# Patient Record
Sex: MALE | Race: WHITE | NOT HISPANIC OR LATINO | Employment: FULL TIME | URBAN - METROPOLITAN AREA
[De-identification: names, ages, dates, MRNs, and addresses within clinical notes are randomized per-mention and may not be internally consistent; named-entity substitution may affect disease eponyms.]

---

## 2019-01-22 ENCOUNTER — TRANSCRIBE ORDERS (OUTPATIENT)
Dept: ADMINISTRATIVE | Facility: HOSPITAL | Age: 64
End: 2019-01-22

## 2019-01-22 DIAGNOSIS — R07.9 CHEST PAIN, UNSPECIFIED TYPE: Primary | ICD-10-CM

## 2019-01-31 ENCOUNTER — HOSPITAL ENCOUNTER (OUTPATIENT)
Dept: CARDIOLOGY | Facility: HOSPITAL | Age: 64
Discharge: HOME | End: 2019-01-31
Attending: INTERNAL MEDICINE | Admitting: INTERNAL MEDICINE

## 2019-01-31 DIAGNOSIS — R07.9 CHEST PAIN, UNSPECIFIED TYPE: ICD-10-CM

## 2019-01-31 PROCEDURE — 93016 CV STRESS TEST SUPVJ ONLY: CPT | Performed by: INTERNAL MEDICINE

## 2019-01-31 PROCEDURE — 93018 CV STRESS TEST I&R ONLY: CPT | Performed by: INTERNAL MEDICINE

## 2019-01-31 PROCEDURE — 93017 CV STRESS TEST TRACING ONLY: CPT

## 2019-02-01 LAB
BH CV STRESS BP STAGE 1: NORMAL
BH CV STRESS BP STAGE 2: NORMAL
BH CV STRESS BP STAGE 3: NORMAL
BH CV STRESS DURATION MIN STAGE 1: 3
BH CV STRESS DURATION MIN STAGE 2: 3
BH CV STRESS DURATION MIN STAGE 3: 1
BH CV STRESS DURATION SEC STAGE 1: 0
BH CV STRESS DURATION SEC STAGE 2: 0
BH CV STRESS DURATION SEC STAGE 3: 21
BH CV STRESS GRADE STAGE 1: 10
BH CV STRESS GRADE STAGE 2: 12
BH CV STRESS GRADE STAGE 3: 14
BH CV STRESS HR STAGE 1: 113
BH CV STRESS HR STAGE 2: 125
BH CV STRESS HR STAGE 3: 138
BH CV STRESS METS STAGE 1: 5
BH CV STRESS METS STAGE 2: 7.5
BH CV STRESS METS STAGE 3: 10
BH CV STRESS PROTOCOL 1: NORMAL
BH CV STRESS RECOVERY BP: NORMAL MMHG
BH CV STRESS RECOVERY HR: 102 BPM
BH CV STRESS SPEED STAGE 1: 1.7
BH CV STRESS SPEED STAGE 2: 2.5
BH CV STRESS SPEED STAGE 3: 3.4
BH CV STRESS STAGE 1: 1
BH CV STRESS STAGE 2: 2
BH CV STRESS STAGE 3: 3
MAXIMAL PREDICTED HEART RATE: 158 BPM
PERCENT MAX PREDICTED HR: 87.34 %
STRESS BASELINE BP: NORMAL MMHG
STRESS BASELINE HR: 95 BPM
STRESS O2 SAT REST: 95 %
STRESS PERCENT HR: 103 %
STRESS POST ESTIMATED WORKLOAD: 9.1 METS
STRESS POST EXERCISE DUR MIN: 7 MIN
STRESS POST EXERCISE DUR SEC: 3 SEC
STRESS POST O2 SAT PEAK: 95 %
STRESS POST PEAK BP: NORMAL MMHG
STRESS POST PEAK HR: 138 BPM
STRESS TARGET HR: 134 BPM

## 2019-06-20 ENCOUNTER — OFFICE VISIT (OUTPATIENT)
Dept: GASTROENTEROLOGY | Facility: CLINIC | Age: 64
End: 2019-06-20

## 2019-06-20 ENCOUNTER — HOSPITAL ENCOUNTER (OUTPATIENT)
Facility: HOSPITAL | Age: 64
Setting detail: HOSPITAL OUTPATIENT SURGERY
End: 2019-06-20
Attending: INTERNAL MEDICINE | Admitting: INTERNAL MEDICINE

## 2019-06-20 VITALS
WEIGHT: 225 LBS | SYSTOLIC BLOOD PRESSURE: 128 MMHG | DIASTOLIC BLOOD PRESSURE: 70 MMHG | BODY MASS INDEX: 34.1 KG/M2 | HEIGHT: 68 IN

## 2019-06-20 DIAGNOSIS — K21.9 GASTROESOPHAGEAL REFLUX DISEASE, ESOPHAGITIS PRESENCE NOT SPECIFIED: Primary | ICD-10-CM

## 2019-06-20 DIAGNOSIS — K59.04 CHRONIC IDIOPATHIC CONSTIPATION: ICD-10-CM

## 2019-06-20 DIAGNOSIS — K62.5 RECTAL BLEEDING: ICD-10-CM

## 2019-06-20 PROCEDURE — 99204 OFFICE O/P NEW MOD 45 MIN: CPT | Performed by: INTERNAL MEDICINE

## 2019-06-20 RX ORDER — LISINOPRIL AND HYDROCHLOROTHIAZIDE 25; 20 MG/1; MG/1
1 TABLET ORAL DAILY
COMMUNITY
Start: 2019-06-17

## 2019-06-20 RX ORDER — RANITIDINE 300 MG/1
300 CAPSULE ORAL 2 TIMES DAILY
Qty: 60 CAPSULE | Refills: 11 | Status: SHIPPED | OUTPATIENT
Start: 2019-06-20 | End: 2020-04-29 | Stop reason: SDUPTHER

## 2019-06-20 RX ORDER — AMLODIPINE BESYLATE 10 MG/1
10 TABLET ORAL DAILY
COMMUNITY
Start: 2019-06-02

## 2019-06-20 RX ORDER — LORAZEPAM 1 MG/1
1 TABLET ORAL 2 TIMES DAILY PRN
COMMUNITY
Start: 2015-09-16

## 2019-06-20 RX ORDER — IBUPROFEN 800 MG/1
800 TABLET ORAL 2 TIMES DAILY PRN
COMMUNITY
Start: 2019-05-13

## 2019-06-20 RX ORDER — DULOXETIN HYDROCHLORIDE 30 MG/1
30 CAPSULE, DELAYED RELEASE ORAL DAILY
COMMUNITY
Start: 2019-06-17 | End: 2022-12-14

## 2019-06-20 RX ORDER — ATORVASTATIN CALCIUM 10 MG/1
TABLET, FILM COATED ORAL
COMMUNITY
Start: 2019-06-06 | End: 2020-06-25 | Stop reason: SDUPTHER

## 2019-06-20 NOTE — PROGRESS NOTES
PATIENT INFORMATION  Trenton Sears       - 1955    CHIEF COMPLAINT  Chief Complaint   Patient presents with   • Constipation   • Abdominal Pain       HISTORY OF PRESENT ILLNESS  Says his UC was dx'd in  and only on med for a year or 2 then. BMs are 2 a day. Doesn't skip( and he feels its small amounts. Lots of blood per his history and mucous, but formed stool.    Doesn't have labs from [place] but he says no anemia nd elevated cholesterol.    Quit Omeprazole due to her renal issues.             REVIEW OF SYSTEMS  Review of Systems   Constitutional: Positive for fatigue.   Eyes: Positive for redness, itching and visual disturbance.   Respiratory: Positive for apnea.    Gastrointestinal: Positive for abdominal distention, abdominal pain, anal bleeding, blood in stool, constipation and rectal pain.        Gerd   Genitourinary: Positive for difficulty urinating.   Musculoskeletal: Positive for back pain.   Allergic/Immunologic: Positive for environmental allergies.   All other systems reviewed and are negative.        ACTIVE PROBLEMS  There are no active problems to display for this patient.        PAST MEDICAL HISTORY  Past Medical History:   Diagnosis Date   • Colon polyp    • Hypertension          SURGICAL HISTORY  Past Surgical History:   Procedure Laterality Date   • COLONOSCOPY           FAMILY HISTORY  Family History   Problem Relation Age of Onset   • Crohn's disease Sister    • Colon cancer Neg Hx    • Colon polyps Neg Hx          SOCIAL HISTORY  Social History     Occupational History   • Not on file   Tobacco Use   • Smoking status: Current Every Day Smoker   • Smokeless tobacco: Never Used   Substance and Sexual Activity   • Alcohol use: No     Frequency: Never   • Drug use: Not on file   • Sexual activity: Not on file       Debilities/Disabilities Identified: None    Emotional Behavior: Appropriate    CURRENT MEDICATIONS    Current Outpatient Medications:   •  LORazepam (ATIVAN) 1 MG  "tablet, lorazepam 1 mg tablet  TAKE ONE TABLET BY MOUTH EVERY 6 HOURS AS NEEDED, Disp: , Rfl:   •  amLODIPine (NORVASC) 10 MG tablet, , Disp: , Rfl:   •  ASPIRIN 81 PO, Aspir-81  one daily, Disp: , Rfl:   •  atorvastatin (LIPITOR) 10 MG tablet, , Disp: , Rfl:   •  DULoxetine (CYMBALTA) 30 MG capsule, , Disp: , Rfl:   •  ibuprofen (ADVIL,MOTRIN) 800 MG tablet, , Disp: , Rfl:   •  lisinopril-hydrochlorothiazide (PRINZIDE,ZESTORETIC) 20-25 MG per tablet, , Disp: , Rfl:     ALLERGIES  Patient has no known allergies.    VITALS  Vitals:    06/20/19 1001   BP: 128/70   Weight: 102 kg (225 lb)   Height: 172.7 cm (68\")       LAST RESULTS   Hospital Outpatient Visit on 01/31/2019   Component Date Value Ref Range Status   • BH CV STRESS PROTOCOL 1 01/31/2019 Fly   Final   • Stage 1 01/31/2019 1   Final   • HR Stage 1 01/31/2019 113   Final   • BP Stage 1 01/31/2019 152/77   Final   • Duration Min Stage 1 01/31/2019 3   Final   • Duration Sec Stage 1 01/31/2019 0   Final   • Grade Stage 1 01/31/2019 10   Final   • Speed Stage 1 01/31/2019 1.7   Final   • BH CV STRESS METS STAGE 1 01/31/2019 5   Final   • Stage 2 01/31/2019 2   Final   • HR Stage 2 01/31/2019 125   Final   • BP Stage 2 01/31/2019 183/76   Final   • Duration Min Stage 2 01/31/2019 3   Final   • Duration Sec Stage 2 01/31/2019 0   Final   • Grade Stage 2 01/31/2019 12   Final   • Speed Stage 2 01/31/2019 2.5   Final   • BH CV STRESS METS STAGE 2 01/31/2019 7.5   Final   • Stage 3 01/31/2019 3   Final   • HR Stage 3 01/31/2019 138   Final   • BP Stage 3 01/31/2019 198/82   Final   • Duration Min Stage 3 01/31/2019 1   Final   • Duration Sec Stage 3 01/31/2019 21   Final   • Grade Stage 3 01/31/2019 14   Final   • Speed Stage 3 01/31/2019 3.4   Final   • BH CV STRESS METS STAGE 3 01/31/2019 10.0   Final   • Baseline HR 01/31/2019 95  bpm Final   • Baseline BP 01/31/2019 136/90  mmHg Final   • O2 sat rest 01/31/2019 95  % Final   • Peak HR 01/31/2019 138  bpm Final "   • Percent Max Pred HR 01/31/2019 87.34  % Final   • Percent Target HR 01/31/2019 103  % Final   • Peak BP 01/31/2019 198/90  mmHg Final   • O2 sat peak 01/31/2019 95  % Final   • Recovery HR 01/31/2019 102  bpm Final   • Recovery BP 01/31/2019 146/86  mmHg Final   • Target HR (85%) 01/31/2019 134  bpm Final   • Max. Pred. HR (100%) 01/31/2019 158  bpm Final   • Exercise duration (min) 01/31/2019 7  min Final   • Exercise duration (sec) 01/31/2019 3  sec Final   • Estimated workload 01/31/2019 9.1  METS Final     No results found.    PHYSICAL EXAM  Physical Exam   Constitutional: He is oriented to person, place, and time. He appears well-developed and well-nourished.   HENT:   Head: Normocephalic and atraumatic.   Eyes: Conjunctivae and EOM are normal. Pupils are equal, round, and reactive to light. No scleral icterus.   Neck: Normal range of motion. Neck supple. No thyromegaly present.   Cardiovascular: Normal rate, regular rhythm, normal heart sounds and intact distal pulses. Exam reveals no gallop.   No murmur heard.  Pulmonary/Chest: Effort normal and breath sounds normal. He has no wheezes. He has no rales.   Abdominal: Soft. Bowel sounds are normal. He exhibits no shifting dullness, no distension, no fluid wave, no abdominal bruit, no ascites and no mass. There is no hepatosplenomegaly. There is tenderness in the right upper quadrant, right lower quadrant, epigastric area, left upper quadrant and left lower quadrant. There is no guarding and negative Lanier's sign. Hernia confirmed negative in the ventral area.   Musculoskeletal: Normal range of motion. He exhibits no edema.   Lymphadenopathy:     He has no cervical adenopathy.   Neurological: He is alert and oriented to person, place, and time.   Skin: Skin is warm and dry. No rash noted. He is not diaphoretic. No erythema.       ASSESSMENT  Diagnoses and all orders for this visit:    Gastroesophageal reflux disease, esophagitis presence not  specified    Rectal bleeding    Chronic idiopathic constipation    Other orders  -     lisinopril-hydrochlorothiazide (PRINZIDE,ZESTORETIC) 20-25 MG per tablet  -     LORazepam (ATIVAN) 1 MG tablet; lorazepam 1 mg tablet   TAKE ONE TABLET BY MOUTH EVERY 6 HOURS AS NEEDED  -     DULoxetine (CYMBALTA) 30 MG capsule  -     amLODIPine (NORVASC) 10 MG tablet  -     atorvastatin (LIPITOR) 10 MG tablet  -     ibuprofen (ADVIL,MOTRIN) 800 MG tablet  -     ASPIRIN 81 PO; Aspir-81   one daily          PLAN  Return in about 2 months (around 8/20/2019).

## 2019-09-24 ENCOUNTER — ANESTHESIA EVENT (OUTPATIENT)
Dept: PERIOP | Facility: HOSPITAL | Age: 64
End: 2019-09-24

## 2019-09-24 RX ORDER — SODIUM CHLORIDE 0.9 % (FLUSH) 0.9 %
1-10 SYRINGE (ML) INJECTION AS NEEDED
Status: CANCELLED | OUTPATIENT
Start: 2019-09-24

## 2019-09-24 RX ORDER — SODIUM CHLORIDE 9 MG/ML
40 INJECTION, SOLUTION INTRAVENOUS AS NEEDED
Status: CANCELLED | OUTPATIENT
Start: 2019-09-24

## 2019-09-24 RX ORDER — SODIUM CHLORIDE, SODIUM LACTATE, POTASSIUM CHLORIDE, CALCIUM CHLORIDE 600; 310; 30; 20 MG/100ML; MG/100ML; MG/100ML; MG/100ML
9 INJECTION, SOLUTION INTRAVENOUS CONTINUOUS
Status: CANCELLED | OUTPATIENT
Start: 2019-09-24

## 2019-09-24 RX ORDER — SODIUM CHLORIDE 0.9 % (FLUSH) 0.9 %
3 SYRINGE (ML) INJECTION EVERY 12 HOURS SCHEDULED
Status: CANCELLED | OUTPATIENT
Start: 2019-09-24

## 2019-09-24 RX ORDER — LIDOCAINE HYDROCHLORIDE 10 MG/ML
0.5 INJECTION, SOLUTION EPIDURAL; INFILTRATION; INTRACAUDAL; PERINEURAL ONCE AS NEEDED
Status: CANCELLED | OUTPATIENT
Start: 2019-09-24

## 2019-09-25 ENCOUNTER — TELEPHONE (OUTPATIENT)
Dept: GASTROENTEROLOGY | Facility: CLINIC | Age: 64
End: 2019-09-25

## 2019-09-25 ENCOUNTER — ANESTHESIA (OUTPATIENT)
Dept: PERIOP | Facility: HOSPITAL | Age: 64
End: 2019-09-25

## 2019-09-25 NOTE — TELEPHONE ENCOUNTER
CALLED AND SPOKE WITH PATIENT.  HE FORGOT ABOUT PROCEDURE.  RESCHEDULED TO [place] 10/21/2019 AT 9:30AM -[facility] 8:30AM.  WILL MAIL INSTRUCTIONS

## 2019-09-25 NOTE — TELEPHONE ENCOUNTER
RECEIVED E-MAIL FROM [facility], SURGERY SCHEDULING, PRE-OP SPOKE WITH HIM AND HE FORGOT ABOUT PROCEDURE, SO HE DID NOT PREP.  NEED TO RESCHEDULE

## 2019-10-15 ENCOUNTER — PREP FOR SURGERY (OUTPATIENT)
Dept: OTHER | Facility: HOSPITAL | Age: 64
End: 2019-10-15

## 2019-10-15 DIAGNOSIS — K62.5 RECTAL BLEEDING: ICD-10-CM

## 2019-10-15 DIAGNOSIS — K21.9 GASTROESOPHAGEAL REFLUX DISEASE, ESOPHAGITIS PRESENCE NOT SPECIFIED: Primary | ICD-10-CM

## 2019-10-21 ENCOUNTER — LAB REQUISITION (OUTPATIENT)
Dept: LAB | Facility: HOSPITAL | Age: 64
End: 2019-10-21

## 2019-10-21 ENCOUNTER — OUTSIDE FACILITY SERVICE (OUTPATIENT)
Dept: GASTROENTEROLOGY | Facility: CLINIC | Age: 64
End: 2019-10-21

## 2019-10-21 DIAGNOSIS — K21.9 GASTRO-ESOPHAGEAL REFLUX DISEASE WITHOUT ESOPHAGITIS: ICD-10-CM

## 2019-10-21 PROCEDURE — 43239 EGD BIOPSY SINGLE/MULTIPLE: CPT | Performed by: INTERNAL MEDICINE

## 2019-10-21 PROCEDURE — 88305 TISSUE EXAM BY PATHOLOGIST: CPT | Performed by: INTERNAL MEDICINE

## 2019-10-22 LAB
CYTO UR: NORMAL
LAB AP CASE REPORT: NORMAL
LAB AP CLINICAL INFORMATION: NORMAL
LAB AP DIAGNOSIS COMMENT: NORMAL
PATH REPORT.FINAL DX SPEC: NORMAL
PATH REPORT.GROSS SPEC: NORMAL

## 2019-10-23 ENCOUNTER — TELEPHONE (OUTPATIENT)
Dept: GASTROENTEROLOGY | Facility: CLINIC | Age: 64
End: 2019-10-23

## 2019-10-23 NOTE — TELEPHONE ENCOUNTER
CALLED AND NOTIFIED PATIENT OF RESULTS.  HE WAS TOLD TO START TAKING OMEPRAZOLE ALONG WITH RANITIDINE.  DOES NOT HAVE RX FOR OMEPRAZOLE, PLEASE SEND TO PHARMACY (INFO IN CHART).

## 2019-10-24 RX ORDER — OMEPRAZOLE 40 MG/1
40 CAPSULE, DELAYED RELEASE ORAL DAILY
Qty: 90 CAPSULE | Refills: 3 | Status: SHIPPED | OUTPATIENT
Start: 2019-10-24 | End: 2020-08-20 | Stop reason: SDUPTHER

## 2019-12-05 ENCOUNTER — OFFICE VISIT (OUTPATIENT)
Dept: SLEEP MEDICINE | Facility: HOSPITAL | Age: 64
End: 2019-12-05

## 2019-12-05 VITALS
BODY MASS INDEX: 33.65 KG/M2 | HEIGHT: 68 IN | SYSTOLIC BLOOD PRESSURE: 142 MMHG | WEIGHT: 222 LBS | DIASTOLIC BLOOD PRESSURE: 92 MMHG | HEART RATE: 94 BPM

## 2019-12-05 DIAGNOSIS — G47.10 HYPERSOMNIA: ICD-10-CM

## 2019-12-05 DIAGNOSIS — G47.8 NON-RESTORATIVE SLEEP: ICD-10-CM

## 2019-12-05 DIAGNOSIS — G47.30 SLEEP APNEA, UNSPECIFIED TYPE: Primary | ICD-10-CM

## 2019-12-05 PROBLEM — G47.33 OBSTRUCTIVE SLEEP APNEA, ADULT: Status: ACTIVE | Noted: 2019-12-05

## 2019-12-05 PROCEDURE — G0463 HOSPITAL OUTPT CLINIC VISIT: HCPCS

## 2019-12-05 PROCEDURE — 99213 OFFICE O/P EST LOW 20 MIN: CPT | Performed by: FAMILY MEDICINE

## 2019-12-05 NOTE — PROGRESS NOTES
[facility] New Patient[facility]Consultation       Reason for Consultation: History of sleep apnea      Patient Care Team:  Amaury Wynn MD as PCP - General  Amaury Wynn MD as PCP - Family Medicine  Mihir Stevens MD as Consulting Physician (Sleep Medicine)      History of present illness:  Thank you for asking me to see your patient.  The patient is a 64 y.o. male with history of sleep apnea presents today to establish care.  Reports he had a sleep study about 7 years ago in [place] and was prescribed a Pap machine does not use currently.  Reports in the past is actually had a total of 2 sleep studies and was told he has severe sleep apnea each time however tried the machine each time for about a couple weeks and could not get used to the mask or the pressure therefore discontinued on his own.  Recently was under anesthesia for endoscopy and had issues where he stopped breathing when they pulled the scope out and had to be bagged.  Was told it was due to his untreated sleep apnea.  No history of tonsillectomy.    Sleep Schedule:  Bed time: 6 PM to 7 PM  Sleep latency: 30 minutes  Wake time: 3 AM  Average hours slept: 6 hours  Non-restorative sleep: Yes  Number of naps per day: 0 to  Rotating shifts?:  No  Nocturia: 3X  Electronics in bedroom: N    Excessive daytime sleepiness or drowsiness:Y  Any accidents at work due to sleepiness in the last 5 years:N  Any difficulty driving due to sleepiness or being drowsy: N  Weight changed in the last 5 years:N    Snoring:Y  Witnessed apneas:Y  Have you ever awakened gasping for breath, coughing, choking or respiratory discomfort: Y  Morning headaches: N  Awaken with a sore throat or dry mouth: Y    Any reports of leg jerking at night: N  Urge sensations: N  Does pain disrupt sleep: N  Sweating during sleep: N  Teeth grinding: N    Any sudden episodes of sleep during the day: N  Sleep paralysis/hallucinations: N  Muscle weakness with laughing/anger:  "N  Nightmares: N  Sleep walking: N    Are you sleepy when you increase your sleep time: Y  Do you sleep better away from your own bed: N    ESS: 10    Social History: Tach; smokes 1 pack/day since age 19; no alcohol or drug use; 4 caffeinated beverages a day    Review of Systems:    A complete review of systems was done and all were negative with the exception of all negative    Allergies:  Patient has no known allergies.    Family History: CALLY no       Current Outpatient Medications:   •  amLODIPine (NORVASC) 10 MG tablet, , Disp: , Rfl:   •  ASPIRIN 81 PO, Aspir-81  one daily, Disp: , Rfl:   •  atorvastatin (LIPITOR) 10 MG tablet, , Disp: , Rfl:   •  DULoxetine (CYMBALTA) 30 MG capsule, , Disp: , Rfl:   •  hydrocortisone (ANUSOL-HC) 2.5 % rectal cream, Insert  into the rectum 2 (Two) Times a Day., Disp: 30 g, Rfl: 5  •  ibuprofen (ADVIL,MOTRIN) 800 MG tablet, , Disp: , Rfl:   •  lisinopril-hydrochlorothiazide (PRINZIDE,ZESTORETIC) 20-25 MG per tablet, , Disp: , Rfl:   •  LORazepam (ATIVAN) 1 MG tablet, lorazepam 1 mg tablet  TAKE ONE TABLET BY MOUTH EVERY 6 HOURS AS NEEDED, Disp: , Rfl:   •  omeprazole (priLOSEC) 40 MG capsule, Take 1 capsule by mouth Daily., Disp: 90 capsule, Rfl: 3  •  ranitidine (ZANTAC) 300 MG capsule, Take 1 capsule by mouth 2 (Two) Times a Day., Disp: 60 capsule, Rfl: 11    Vital Signs:    Vitals:    12/05/19 0900   BP: 142/92   Pulse: 94   Weight: 101 kg (222 lb)   Height: 172.7 cm (68\")      Body mass index is 33.75 kg/m².  Neck Circumference: 17.75 inches      Physical Exam:   General Alert and oriented. No acute distress noted   Pharynx/Throat Class IV Mallampati airway, large tongue, no evidence of redundant lateral pharyngeal tissue. No oral lesions. No thrush. Moist mucous membranes.   Head Normocephalic. Symmetrical. Atraumatic.    Nose No septal deviation. No drainage   Chest Wall Normal shape. Symmetric expansion with respiration. No tenderness.   Neck Trachea midline, no " thyromegaly or adenopathy    Lungs Clear to auscultation bilaterally. No wheezes. No rhonchi. No rales. Respirations regular, even and unlabored.   Heart Regular rhythm and normal rate. Normal S1 and S2. No murmur   Abdomen Soft, non-tender and non-distended. Normal bowel sounds. No masses.   Extremities Moves all extremities well. No edema   Psychiatric Normal mood and affect.       Impression:  1. Sleep apnea, unspecified type    2. Hypersomnia    3. Non-restorative sleep        Plan:    Good sleep hygiene measures should be maintained.  Weight loss would be beneficial in this patient who is obese BMI 33.8.    I discussed the pathophysiology of obstructive sleep apnea with the patient.  We discussed the adverse outcomes associated with untreated sleep-disordered breathing.  We discussed treatment modalities of obstructive sleep apnea including CPAP device as well as oral mandibular advancement device. Sleep study will be scheduled to establish definitive diagnosis of sleep disorder breathing.  Weight loss will be strongly beneficial in order to reduce the severity of sleep-disordered breathing.  Patient has narrow oropharyngeal structure.  Caution during activities that require prolonged concentration is strongly advised.  Patient will be notified of sleep study results after sleep study is completed.  If sleep apnea is only mild,  oral mandibular advancement device may be one of the treatment options.  However if sleep apnea is moderately severe, CPAP treatment will be strongly encouraged.  The patient is not opposed to treatment with CPAP device if we confirm significant obstructive sleep apnea on polysomnography.     Thank you for allowing me to participate in your patient's care.    Mihir Stevens MD  Sleep Medicine  12/05/19  10:12 AM

## 2019-12-12 ENCOUNTER — HOSPITAL ENCOUNTER (OUTPATIENT)
Dept: SLEEP MEDICINE | Facility: HOSPITAL | Age: 64
Discharge: HOME | End: 2019-12-12
Admitting: FAMILY MEDICINE

## 2019-12-12 DIAGNOSIS — G47.10 HYPERSOMNIA: ICD-10-CM

## 2019-12-12 DIAGNOSIS — G47.8 NON-RESTORATIVE SLEEP: ICD-10-CM

## 2019-12-12 DIAGNOSIS — G47.30 SLEEP APNEA, UNSPECIFIED TYPE: ICD-10-CM

## 2019-12-12 PROCEDURE — 95806 SLEEP STUDY UNATT&RESP EFFT: CPT

## 2019-12-12 PROCEDURE — 95806 SLEEP STUDY UNATT&RESP EFFT: CPT | Performed by: FAMILY MEDICINE

## 2019-12-16 ENCOUNTER — OFFICE VISIT (OUTPATIENT)
Dept: GASTROENTEROLOGY | Facility: CLINIC | Age: 64
End: 2019-12-16

## 2019-12-16 VITALS
HEIGHT: 68 IN | BODY MASS INDEX: 33.34 KG/M2 | DIASTOLIC BLOOD PRESSURE: 88 MMHG | WEIGHT: 220 LBS | SYSTOLIC BLOOD PRESSURE: 138 MMHG

## 2019-12-16 DIAGNOSIS — Z72.0 TOBACCO USE: Primary | ICD-10-CM

## 2019-12-16 DIAGNOSIS — K22.10 ULCERATIVE ESOPHAGITIS: ICD-10-CM

## 2019-12-16 DIAGNOSIS — Z86.010 PERSONAL HISTORY OF COLONIC POLYPS: ICD-10-CM

## 2019-12-16 PROBLEM — Z86.0100 PERSONAL HISTORY OF COLONIC POLYPS: Status: ACTIVE | Noted: 2019-12-16

## 2019-12-16 PROCEDURE — 99212 OFFICE O/P EST SF 10 MIN: CPT | Performed by: INTERNAL MEDICINE

## 2019-12-16 NOTE — PROGRESS NOTES
PATIENT INFORMATION  Trenton Sears       - 1955    CHIEF COMPLAINT  Chief Complaint   Patient presents with   • Follow-up     2 mo follow up on EGD       HISTORY OF PRESENT ILLNESS  Here for f/u of EGD and had ulcerative reflux and had Laryngospasm post procedure but has done well back on Daily PPI and BId H2RA    Overall doing well wrt symptoms    Has had polyps in the past and hasnt scheduled hos exam yet.        REVIEW OF SYSTEMS  Review of Systems   Respiratory: Positive for apnea.    Musculoskeletal: Positive for back pain.   All other systems reviewed and are negative.        ACTIVE PROBLEMS  Patient Active Problem List    Diagnosis   • Ulcerative esophagitis [K22.10]   • Personal history of colonic polyps [Z86.010]   • Gastroesophageal reflux disease [K21.9]   • Rectal bleeding [K62.5]         PAST MEDICAL HISTORY  Past Medical History:   Diagnosis Date   • Colon polyp    • Hypertension          SURGICAL HISTORY  Past Surgical History:   Procedure Laterality Date   • COLONOSCOPY           FAMILY HISTORY  Family History   Problem Relation Age of Onset   • Crohn's disease Sister    • Colon cancer Neg Hx    • Colon polyps Neg Hx          SOCIAL HISTORY  Social History     Occupational History   • Not on file   Tobacco Use   • Smoking status: Current Every Day Smoker   • Smokeless tobacco: Never Used   Substance and Sexual Activity   • Alcohol use: No     Frequency: Never   • Drug use: Not on file   • Sexual activity: Not on file       Debilities/Disabilities Identified: None    Emotional Behavior: Depressed    CURRENT MEDICATIONS    Current Outpatient Medications:   •  amLODIPine (NORVASC) 10 MG tablet, , Disp: , Rfl:   •  ASPIRIN 81 PO, Aspir-81  one daily, Disp: , Rfl:   •  atorvastatin (LIPITOR) 10 MG tablet, , Disp: , Rfl:   •  DULoxetine (CYMBALTA) 30 MG capsule, , Disp: , Rfl:   •  hydrocortisone (ANUSOL-HC) 2.5 % rectal cream, Insert  into the rectum 2 (Two) Times a Day., Disp: 30 g, Rfl: 5  •   "ibuprofen (ADVIL,MOTRIN) 800 MG tablet, , Disp: , Rfl:   •  lisinopril-hydrochlorothiazide (PRINZIDE,ZESTORETIC) 20-25 MG per tablet, , Disp: , Rfl:   •  LORazepam (ATIVAN) 1 MG tablet, lorazepam 1 mg tablet  TAKE ONE TABLET BY MOUTH EVERY 6 HOURS AS NEEDED, Disp: , Rfl:   •  omeprazole (priLOSEC) 40 MG capsule, Take 1 capsule by mouth Daily., Disp: 90 capsule, Rfl: 3  •  ranitidine (ZANTAC) 300 MG capsule, Take 1 capsule by mouth 2 (Two) Times a Day., Disp: 60 capsule, Rfl: 11    ALLERGIES  Patient has no known allergies.    VITALS  Vitals:    12/16/19 1610   BP: 138/88   Weight: 99.8 kg (220 lb)   Height: 172.7 cm (67.99\")       LAST RESULTS   Lab Requisition on 10/21/2019   Component Date Value Ref Range Status   • Case Report 10/21/2019    Final                    Value:Surgical Pathology Report                         Case: [id number]                                  Authorizing Provider:  Roberto Shine        Collected:           10/21/2019 10:16 AM                                 MD Chino                                                                   Pathologist:           Antoinette Ferguson MD        Received:            10/21/2019 11:24 AM          Specimens:   1) - Stomach, Gastric bx                                                                            2) - Esophagus, Distal, Distal esophageal bx                                              • Clinical Information 10/21/2019    Final                    Value:This result contains rich text formatting which cannot be displayed here.   • Final Diagnosis 10/21/2019    Final                    Value:This result contains rich text formatting which cannot be displayed here.   • Comment 10/21/2019    Final                    Value:This result contains rich text formatting which cannot be displayed here.   • Gross Description 10/21/2019    Final                    Value:This result contains rich text formatting which cannot be displayed here.   • " Microscopic Description 10/21/2019    Final                    Value:This result contains rich text formatting which cannot be displayed here.     No results found.    PHYSICAL EXAM  Physical Exam   Constitutional: He is oriented to person, place, and time. He appears well-developed and well-nourished.   HENT:   Head: Normocephalic and atraumatic.   Eyes: Pupils are equal, round, and reactive to light. Conjunctivae and EOM are normal. No scleral icterus.   Neck: Normal range of motion. Neck supple. No thyromegaly present.   Cardiovascular: Normal rate, regular rhythm, normal heart sounds and intact distal pulses. Exam reveals no gallop.   No murmur heard.  Pulmonary/Chest: Effort normal and breath sounds normal. He has no wheezes. He has no rales.   Abdominal: Soft. Bowel sounds are normal. He exhibits no shifting dullness, no distension, no fluid wave, no abdominal bruit, no ascites and no mass. There is no hepatosplenomegaly. There is no tenderness. There is no guarding and negative Lanier's sign. Hernia confirmed negative in the ventral area.   Musculoskeletal: Normal range of motion. He exhibits no edema.   Lymphadenopathy:     He has no cervical adenopathy.   Neurological: He is alert and oriented to person, place, and time.   Skin: Skin is warm and dry. No rash noted. He is not diaphoretic. No erythema.   Psychiatric: He has a normal mood and affect. His behavior is normal.       ASSESSMENT  Diagnoses and all orders for this visit:    Tobacco use    Ulcerative esophagitis    Personal history of colonic polyps          PLAN  Return in about 6 months (around 6/16/2020).

## 2019-12-16 NOTE — PATIENT INSTRUCTIONS
For more information:  Quit Now [place]  1-800-QUIT-NOW  https://[place].quitlogix.org/en-[place]/      Steps to Quit Smoking    Smoking tobacco can be harmful to your health and can affect almost every organ in your body. Smoking puts you, and those around you, at risk for developing many serious chronic diseases. Quitting smoking is difficult, but it is one of the best things that you can do for your health. It is never too late to quit.  What are the benefits of quitting smoking?  When you quit smoking, you lower your risk of developing serious diseases and conditions, such as:  · Lung cancer or lung disease, such as COPD.  · Heart disease.  · Stroke.  · Heart attack.  · Infertility.  · Osteoporosis and bone fractures.  Additionally, symptoms such as coughing, wheezing, and shortness of breath may get better when you quit. You may also find that you get sick less often because your body is stronger at fighting off colds and infections. If you are pregnant, quitting smoking can help to reduce your chances of having a baby of low birth weight.  How do I get ready to quit?  When you decide to quit smoking, create a plan to make sure that you are successful. Before you quit:  · Pick a date to quit. Set a date within the next two weeks to give you time to prepare.  · Write down the reasons why you are quitting. Keep this list in places where you will see it often, such as on your bathroom mirror or in your car or wallet.  · Identify the people, places, things, and activities that make you want to smoke (triggers) and avoid them. Make sure to take these actions:  ? Throw away all cigarettes at home, at work, and in your car.  ? Throw away smoking accessories, such as ashtrays and lighters.  ? Clean your car and make sure to empty the ashtray.  ? Clean your home, including curtains and carpets.  · Tell your family, friends, and coworkers that you are quitting. Support from your loved ones can make quitting  easier.  · Talk with your health care provider about your options for quitting smoking.  · Find out what treatment options are covered by your health insurance.  What strategies can I use to quit smoking?  Talk with your healthcare provider about different strategies to quit smoking. Some strategies include:  · Quitting smoking altogether instead of gradually lessening how much you smoke over a period of time. Research shows that quitting “cold turkey” is more successful than gradually quitting.  · Attending in-person counseling to help you build problem-solving skills. You are more likely to have success in quitting if you attend several counseling sessions. Even short sessions of 10 minutes can be effective.  · Finding resources and support systems that can help you to quit smoking and remain smoke-free after you quit. These resources are most helpful when you use them often. They can include:  ? Online chats with a counselor.  ? Telephone quitlines.  ? Printed self-help materials.  ? Support groups or group counseling.  ? Text messaging programs.  ? Mobile phone applications.  · Taking medicines to help you quit smoking. (If you are pregnant or breastfeeding, talk with your health care provider first.) Some medicines contain nicotine and some do not. Both types of medicines help with cravings, but the medicines that include nicotine help to relieve withdrawal symptoms. Your health care provider may recommend:  ? Nicotine patches, gum, or lozenges.  ? Nicotine inhalers or sprays.  ? Non-nicotine medicine that is taken by mouth.  Talk with your health care provider about combining strategies, such as taking medicines while you are also receiving in-person counseling. Using these two strategies together makes you more likely to succeed in quitting than if you used either strategy on its own.  If you are pregnant or breastfeeding, talk with your health care provider about finding counseling or other support  strategies to quit smoking. Do not take medicine to help you quit smoking unless told to do so by your health care provider.  What things can I do to make it easier to quit?  Quitting smoking might feel overwhelming at first, but there is a lot that you can do to make it easier. Take these important actions:  · Reach out to your family and friends and ask that they support and encourage you during this time. Call telephone quitlines, reach out to support groups, or work with a counselor for support.  · Ask people who smoke to avoid smoking around you.  · Avoid places that trigger you to smoke, such as bars, parties, or smoke-break areas at work.  · Spend time around people who do not smoke.  · Lessen stress in your life, because stress can be a smoking trigger for some people. To lessen stress, try:  ? Exercising regularly.  ? Deep-breathing exercises.  ? Yoga.  ? Meditating.  ? Performing a body scan. This involves closing your eyes, scanning your body from head to toe, and noticing which parts of your body are particularly tense. Purposefully relax the muscles in those areas.  · Download or purchase mobile phone or tablet apps (applications) that can help you stick to your quit plan by providing reminders, tips, and encouragement. There are many free apps, such as QuitGuide from the CDC (Centers for Disease Control and Prevention). You can find other support for quitting smoking (smoking cessation) through smokefree.gov and other websites.  How will I feel when I quit smoking?  Within the first 24 hours of quitting smoking, you may start to feel some withdrawal symptoms. These symptoms are usually most noticeable 2-3 days after quitting, but they usually do not last beyond 2-3 weeks. Changes or symptoms that you might experience include:  · Mood swings.  · Restlessness, anxiety, or irritation.  · Difficulty concentrating.  · Dizziness.  · Strong cravings for sugary foods in addition to nicotine.  · Mild weight  gain.  · Constipation.  · Nausea.  · Coughing or a sore throat.  · Changes in how your medicines work in your body.  · A depressed mood.  · Difficulty sleeping (insomnia).  After the first 2-3 weeks of quitting, you may start to notice more positive results, such as:  · Improved sense of smell and taste.  · Decreased coughing and sore throat.  · Slower heart rate.  · Lower blood pressure.  · Clearer skin.  · The ability to breathe more easily.  · Fewer sick days.  Quitting smoking is very challenging for most people. Do not get discouraged if you are not successful the first time. Some people need to make many attempts to quit before they achieve long-term success. Do your best to stick to your quit plan, and talk with your health care provider if you have any questions or concerns.  This information is not intended to replace advice given to you by your health care provider. Make sure you discuss any questions you have with your health care provider.  Document Released: 12/12/2002 Document Revised: 07/24/2018 Document Reviewed: 05/03/2016  [employer] Interactive Patient Education © 2019 [employer].

## 2019-12-19 ENCOUNTER — TELEPHONE (OUTPATIENT)
Dept: SLEEP MEDICINE | Facility: HOSPITAL | Age: 64
End: 2019-12-19

## 2019-12-19 NOTE — TELEPHONE ENCOUNTER
Spoke with patient about results, faxing orders to [facility], f/u on2/20/2020-AK

## 2020-02-20 ENCOUNTER — OFFICE VISIT (OUTPATIENT)
Dept: SLEEP MEDICINE | Facility: HOSPITAL | Age: 65
End: 2020-02-20

## 2020-02-20 VITALS
HEART RATE: 92 BPM | SYSTOLIC BLOOD PRESSURE: 136 MMHG | BODY MASS INDEX: 34.4 KG/M2 | WEIGHT: 227 LBS | DIASTOLIC BLOOD PRESSURE: 91 MMHG | HEIGHT: 68 IN

## 2020-02-20 DIAGNOSIS — G47.33 OBSTRUCTIVE SLEEP APNEA, ADULT: Primary | ICD-10-CM

## 2020-02-20 PROCEDURE — G0463 HOSPITAL OUTPT CLINIC VISIT: HCPCS

## 2020-02-20 PROCEDURE — 99213 OFFICE O/P EST LOW 20 MIN: CPT | Performed by: FAMILY MEDICINE

## 2020-02-20 NOTE — PROGRESS NOTES
Follow Up Sleep Disorders Center Note     Chief Complaint:  CALLY     Primary Care Physician: Amaury Wynn MD    Trenton Sears is a 64 y.o.male  was last seen at [facility] sleep lab: 12/12/2019 for home sleep study where patient was found to have overall AHI 53.4 events per hour.  Of note in the past she has had 2 sleep studies where he was told he had severe sleep apnea however each time he tried the machine he could not get used to the mask or pressure therefore discontinued on his own.  At his last visit with me discussed that recently while under anesthesia for endoscopy he had issues where he stopped breathing when they pulled the scope out and he had to be bagged.  This is why he came to establish care and try to get back into treatment.    Patient was started on auto CPAP 8-18 cm H2O.  He presents today for first follow-up visit.  Patient reports he is doing very well.  Very much improved in terms of his hypersomnia.  Sleeping well.  Tolerating mask and machine well.    Results Review:  DME is [facility].  Downloads between 1/21/2020-2/19/2020.  Average usage is 6 hours 54 minutes.  Average AHI is 4.4.  Average AutoCPAP pressure is 16.6 cm H2O.    Current Medications:    Current Outpatient Medications:   •  amLODIPine (NORVASC) 10 MG tablet, , Disp: , Rfl:   •  ASPIRIN 81 PO, Aspir-81  one daily, Disp: , Rfl:   •  atorvastatin (LIPITOR) 10 MG tablet, , Disp: , Rfl:   •  DULoxetine (CYMBALTA) 30 MG capsule, , Disp: , Rfl:   •  hydrocortisone (ANUSOL-HC) 2.5 % rectal cream, Insert  into the rectum 2 (Two) Times a Day., Disp: 30 g, Rfl: 5  •  ibuprofen (ADVIL,MOTRIN) 800 MG tablet, , Disp: , Rfl:   •  lisinopril-hydrochlorothiazide (PRINZIDE,ZESTORETIC) 20-25 MG per tablet, , Disp: , Rfl:   •  LORazepam (ATIVAN) 1 MG tablet, lorazepam 1 mg tablet  TAKE ONE TABLET BY MOUTH EVERY 6 HOURS AS NEEDED, Disp: , Rfl:   •  omeprazole (priLOSEC) 40 MG capsule, Take 1 capsule by mouth Daily., Disp: 90 capsule, Rfl: 3  •   "ranitidine (ZANTAC) 300 MG capsule, Take 1 capsule by mouth 2 (Two) Times a Day., Disp: 60 capsule, Rfl: 11   also entered in Sleep Questionnaire    Patient  has a past medical history of Colon polyp and Hypertension.    Social History:    Social History     Socioeconomic History   • Marital status:      Spouse name: Not on file   • Number of children: Not on file   • Years of education: Not on file   • Highest education level: Not on file   Tobacco Use   • Smoking status: Current Every Day Smoker   • Smokeless tobacco: Never Used   Substance and Sexual Activity   • Alcohol use: No     Frequency: Never       Allergies:  Patient has no known allergies.    Review of Systems:    A complete review of systems was done and all were negative with the exception of all negative    Vital Signs:    Vitals:    02/20/20 1100   BP: 136/91   Pulse: 92   Weight: 103 kg (227 lb)   Height: 172.7 cm (68\")     Body mass index is 34.52 kg/m².    Vital Signs /91   Pulse 92   Ht 172.7 cm (68\")   Wt 103 kg (227 lb)   BMI 34.52 kg/m²  Body mass index is 34.52 kg/m².    General Alert and oriented. No acute distress noted   Pharynx/Throat Class IV Mallampati airway, large tongue, no evidence of redundant lateral pharyngeal tissue. No oral lesions. No thrush. Moist mucous membranes.   Head Normocephalic. Symmetrical. Atraumatic.    Nose No septal deviation. No drainage   Chest Wall Normal shape. Symmetric expansion with respiration. No tenderness.   Neck Trachea midline, no thyromegaly or adenopathy    Lungs Clear to auscultation bilaterally. No wheezes. No rhonchi. No rales. Respirations regular, even and unlabored.   Heart Regular rhythm and normal rate. Normal S1 and S2. No murmur   Abdomen Soft, non-tender and non-distended. Normal bowel sounds. No masses.   Extremities Moves all extremities well. No edema   Psychiatric Normal mood and affect.     Impression:  1. Obstructive sleep apnea, adult        Obstructive sleep " apnea adequately treated with auto CPAP with good compliance and usage and no complaints of hypersomnolence.  Return to clinic in 3 months for follow-up or sooner if needed.    Patient uses the CPAP device and benefits from its use in terms of reduction of hypersomnia and snoring.Weight loss will be strongly beneficial to reduce the severity of sleep-disordered breathing.  Caution during activities that require prolonged concentration is strongly advised if sleepiness returns. Changing of PAP supplies regularly is important for effective use. Patient needs to change cushion on the mask or plugs on nasal pillows along with disposable filters once every month and change mask frame, tubing, headgear and Velcro straps every 6 months at the minimum.    Mihir Stevens MD  Sleep Medicine  02/20/20  11:52 AM

## 2020-04-29 RX ORDER — FAMOTIDINE 40 MG/1
40 TABLET, FILM COATED ORAL DAILY
Qty: 90 TABLET | Refills: 3 | Status: SHIPPED | OUTPATIENT
Start: 2020-04-29 | End: 2021-05-10

## 2020-05-18 ENCOUNTER — TELEMEDICINE (OUTPATIENT)
Dept: GASTROENTEROLOGY | Facility: CLINIC | Age: 65
End: 2020-05-18

## 2020-05-18 DIAGNOSIS — K22.10 ULCERATIVE ESOPHAGITIS: ICD-10-CM

## 2020-05-18 DIAGNOSIS — Z86.010 PERSONAL HISTORY OF COLONIC POLYPS: Primary | ICD-10-CM

## 2020-05-18 PROCEDURE — 99212 OFFICE O/P EST SF 10 MIN: CPT | Performed by: INTERNAL MEDICINE

## 2020-05-18 RX ORDER — RANITIDINE 300 MG/1
CAPSULE ORAL
COMMUNITY
End: 2020-05-18

## 2020-05-18 NOTE — PROGRESS NOTES
Video visit:  You have chosen to receive care through a telehealth visit.  Do you consent to use a video/audio connection for your medical care today? Yes    PATIENT INFORMATION  Trenton Sears       - 1955    CHIEF COMPLAINT  Chief Complaint   Patient presents with   • Follow-up     6 MO FOLLOW UP ON GERD       HISTORY OF PRESENT ILLNESS  Daily Omeprazole and no w a Pepcid and no breakthrough HB.     His last colon was > 5 years and had polyps     His EGD unfortunately eneded with laryngospam and required positive pressure oxygenation but he did well.     Will schedule his Colon ain Schleicher instead of EP to have inPt backup as an option.          REVIEW OF SYSTEMS  Review of Systems      ACTIVE PROBLEMS  Patient Active Problem List    Diagnosis   • Obstructive sleep apnea, adult [G47.33]   • Ulcerative esophagitis [K22.10]   • Personal history of colonic polyps [Z86.010]   • Gastroesophageal reflux disease [K21.9]   • Rectal bleeding [K62.5]         PAST MEDICAL HISTORY  Past Medical History:   Diagnosis Date   • Colon polyp    • Hypertension          SURGICAL HISTORY  Past Surgical History:   Procedure Laterality Date   • COLONOSCOPY           FAMILY HISTORY  Family History   Problem Relation Age of Onset   • Crohn's disease Sister    • Colon cancer Neg Hx    • Colon polyps Neg Hx          SOCIAL HISTORY  Social History     Occupational History   • Not on file   Tobacco Use   • Smoking status: Current Every Day Smoker   • Smokeless tobacco: Never Used   Substance and Sexual Activity   • Alcohol use: No     Frequency: Never   • Drug use: Not on file   • Sexual activity: Not on file         CURRENT MEDICATIONS    Current Outpatient Medications:   •  amLODIPine (NORVASC) 10 MG tablet, , Disp: , Rfl:   •  ASPIRIN 81 PO, Aspir-81  one daily, Disp: , Rfl:   •  atorvastatin (LIPITOR) 10 MG tablet, , Disp: , Rfl:   •  DULoxetine (CYMBALTA) 30 MG capsule, , Disp: , Rfl:   •  famotidine (PEPCID) 40 MG tablet, Take 1  tablet by mouth Daily., Disp: 90 tablet, Rfl: 3  •  hydrocortisone (ANUSOL-HC) 2.5 % rectal cream, Insert  into the rectum 2 (Two) Times a Day., Disp: 30 g, Rfl: 5  •  ibuprofen (ADVIL,MOTRIN) 800 MG tablet, , Disp: , Rfl:   •  lisinopril-hydrochlorothiazide (PRINZIDE,ZESTORETIC) 20-25 MG per tablet, , Disp: , Rfl:   •  LORazepam (ATIVAN) 1 MG tablet, lorazepam 1 mg tablet  TAKE ONE TABLET BY MOUTH EVERY 6 HOURS AS NEEDED, Disp: , Rfl:   •  omeprazole (priLOSEC) 40 MG capsule, Take 1 capsule by mouth Daily., Disp: 90 capsule, Rfl: 3    ALLERGIES  Patient has no known allergies.    VITALS  There were no vitals filed for this visit.    LAST RESULTS   Lab Requisition on 10/21/2019   Component Date Value Ref Range Status   • Case Report 10/21/2019    Final                    Value:Surgical Pathology Report                         Case: [id number]                                  Authorizing Provider:  Roberto Shine        Collected:           10/21/2019 10:16 AM                                 MD [place]                                                                   Pathologist:           Antoinette Ferguson MD        Received:            10/21/2019 11:24 AM          Specimens:   1) - Stomach, Gastric bx                                                                            2) - Esophagus, Distal, Distal esophageal bx                                              • Clinical Information 10/21/2019    Final                    Value:This result contains rich text formatting which cannot be displayed here.   • Final Diagnosis 10/21/2019    Final                    Value:This result contains rich text formatting which cannot be displayed here.   • Comment 10/21/2019    Final                    Value:This result contains rich text formatting which cannot be displayed here.   • Gross Description 10/21/2019    Final                    Value:This result contains rich text formatting which cannot be displayed here.   •  Microscopic Description 10/21/2019    Final                    Value:This result contains rich text formatting which cannot be displayed here.     No results found.    PHYSICAL EXAM  Debilities/Disabilities Identified: None  Emotional Behavior: Appropriate  Physical Exam    ASSESSMENT  Diagnoses and all orders for this visit:    Personal history of colonic polyps  -     Case Request; Standing  -     Obtain informed consent; Standing  -     Verify bowel prep was successful; Standing  -     Give tap water enema if bowel prep was insufficient; Standing  -     Case Request    Ulcerative esophagitis    Other orders  -     Discontinue: raNITIdine (ZANTAC) 300 MG capsule; ranitidine 300 mg capsule          PLAN  Return if symptoms worsen or fail to improve.    I have discussed the above plan with the patient.  They verbalize understanding and are in agreement with the plan.  They have been advised to contact the office for any questions, concerns, or changes related to their health.

## 2020-05-21 ENCOUNTER — APPOINTMENT (OUTPATIENT)
Dept: SLEEP MEDICINE | Facility: HOSPITAL | Age: 65
End: 2020-05-21

## 2020-05-28 ENCOUNTER — APPOINTMENT (OUTPATIENT)
Dept: SLEEP MEDICINE | Facility: HOSPITAL | Age: 65
End: 2020-05-28

## 2020-06-01 ENCOUNTER — OFFICE VISIT (OUTPATIENT)
Dept: SLEEP MEDICINE | Facility: HOSPITAL | Age: 65
End: 2020-06-01

## 2020-06-01 VITALS
HEIGHT: 67 IN | HEART RATE: 90 BPM | BODY MASS INDEX: 35.31 KG/M2 | SYSTOLIC BLOOD PRESSURE: 126 MMHG | WEIGHT: 225 LBS | DIASTOLIC BLOOD PRESSURE: 83 MMHG

## 2020-06-01 DIAGNOSIS — G47.33 OBSTRUCTIVE SLEEP APNEA, ADULT: Primary | ICD-10-CM

## 2020-06-01 PROCEDURE — 99213 OFFICE O/P EST LOW 20 MIN: CPT | Performed by: FAMILY MEDICINE

## 2020-06-01 PROCEDURE — G0463 HOSPITAL OUTPT CLINIC VISIT: HCPCS

## 2020-06-01 NOTE — PROGRESS NOTES
Follow Up Sleep Disorders Center Note     Chief Complaint:  CALLY     Primary Care Physician: Amaury Wynn MD    Trenton Sears is a 64 y.o.male  was last seen at [facility] sleep lab: 2/20/2020; home sleep study in December 2019 showed overall AHI 53.4 events per hour.  Patient was started on auto CPAP 8-18 cm H2O.  Doing well at first follow-up visit in February.  Presents today for 3-month follow-up.  No new issues.  Needs mask for place.  Feels well rested.    Results Review:  DME is [facility].  Downloads between 5/2/2020-5/31/2020.  Average usage is 7 hours 3 minutes.  Average AHI is 2.5.  Average AutoCPAP pressure is 14.8 cm H2O.    Current Medications:    Current Outpatient Medications:   •  amLODIPine (NORVASC) 10 MG tablet, , Disp: , Rfl:   •  ASPIRIN 81 PO, Aspir-81  one daily, Disp: , Rfl:   •  atorvastatin (LIPITOR) 10 MG tablet, , Disp: , Rfl:   •  DULoxetine (CYMBALTA) 30 MG capsule, , Disp: , Rfl:   •  famotidine (PEPCID) 40 MG tablet, Take 1 tablet by mouth Daily., Disp: 90 tablet, Rfl: 3  •  hydrocortisone (ANUSOL-HC) 2.5 % rectal cream, Insert  into the rectum 2 (Two) Times a Day., Disp: 30 g, Rfl: 5  •  ibuprofen (ADVIL,MOTRIN) 800 MG tablet, , Disp: , Rfl:   •  lisinopril-hydrochlorothiazide (PRINZIDE,ZESTORETIC) 20-25 MG per tablet, , Disp: , Rfl:   •  LORazepam (ATIVAN) 1 MG tablet, lorazepam 1 mg tablet  TAKE ONE TABLET BY MOUTH EVERY 6 HOURS AS NEEDED, Disp: , Rfl:   •  omeprazole (priLOSEC) 40 MG capsule, Take 1 capsule by mouth Daily., Disp: 90 capsule, Rfl: 3   also entered in Sleep Questionnaire    Patient  has a past medical history of Colon polyp and Hypertension.    Social History:    Social History     Socioeconomic History   • Marital status:      Spouse name: Not on file   • Number of children: Not on file   • Years of education: Not on file   • Highest education level: Not on file   Tobacco Use   • Smoking status: Current Every Day Smoker   • Smokeless tobacco: Never Used  "  Substance and Sexual Activity   • Alcohol use: No     Frequency: Never       Allergies:  Patient has no known allergies.    Review of Systems:    A complete review of systems was done and all were negative with the exception of all negative    Vital Signs:    Vitals:    06/01/20 1300   BP: 126/83   Pulse: 90   Weight: 102 kg (225 lb)   Height: 170.2 cm (67\")     Body mass index is 35.24 kg/m².    Vital Signs /83   Pulse 90   Ht 170.2 cm (67\")   Wt 102 kg (225 lb)   BMI 35.24 kg/m²  Body mass index is 35.24 kg/m².    General Alert and oriented. No acute distress noted   Pharynx/Throat Class IV Mallampati airway, large tongue, no evidence of redundant lateral pharyngeal tissue. No oral lesions. No thrush. Moist mucous membranes.   Head Normocephalic. Symmetrical. Atraumatic.    Nose No septal deviation. No drainage   Chest Wall Normal shape. Symmetric expansion with respiration. No tenderness.   Neck Trachea midline, no thyromegaly or adenopathy    Lungs Clear to auscultation bilaterally. No wheezes. No rhonchi. No rales. Respirations regular, even and unlabored.   Heart Regular rhythm and normal rate. Normal S1 and S2. No murmur   Abdomen Soft, non-tender and non-distended. Normal bowel sounds. No masses.   Extremities Moves all extremities well. No edema   Psychiatric Normal mood and affect.     Impression:  1. Obstructive sleep apnea, adult        Obstructive sleep apnea adequately treated with auto CPAP with good compliance and usage and no complaints of hypersomnolence.  Return to clinic in 6 months for follow-up or sooner if needed.    Patient uses the CPAP device and benefits from its use in terms of reduction of hypersomnia and snoring.Weight loss will be strongly beneficial to reduce the severity of sleep-disordered breathing.  Caution during activities that require prolonged concentration is strongly advised if sleepiness returns. Changing of PAP supplies regularly is important for effective use. " Patient needs to change cushion on the mask or plugs on nasal pillows along with disposable filters once every month and change mask frame, tubing, headgear and Velcro straps every 6 months at the minimum.    Mihir Stevens MD  Sleep Medicine  06/01/20  14:28

## 2020-06-15 ENCOUNTER — HOSPITAL ENCOUNTER (OUTPATIENT)
Facility: HOSPITAL | Age: 65
Setting detail: HOSPITAL OUTPATIENT SURGERY
End: 2020-06-15
Attending: INTERNAL MEDICINE | Admitting: INTERNAL MEDICINE

## 2020-06-25 ENCOUNTER — HOSPITAL ENCOUNTER (INPATIENT)
Facility: HOSPITAL | Age: 65
LOS: 5 days | Discharge: HOME | End: 2020-06-30
Attending: EMERGENCY MEDICINE | Admitting: SURGERY

## 2020-06-25 ENCOUNTER — APPOINTMENT (OUTPATIENT)
Dept: CT IMAGING | Facility: HOSPITAL | Age: 65
End: 2020-06-25

## 2020-06-25 DIAGNOSIS — K57.92 ACUTE DIVERTICULITIS: Primary | ICD-10-CM

## 2020-06-25 LAB
ALBUMIN SERPL-MCNC: 4.3 G/DL (ref 3.5–5.2)
ALBUMIN/GLOB SERPL: 1.3 G/DL
ALP SERPL-CCNC: 69 U/L (ref 39–117)
ALT SERPL W P-5'-P-CCNC: 19 U/L (ref 1–41)
ANION GAP SERPL CALCULATED.3IONS-SCNC: 15.3 MMOL/L (ref 5–15)
AST SERPL-CCNC: 16 U/L (ref 1–40)
BASOPHILS # BLD AUTO: 0.06 10*3/MM3 (ref 0–0.2)
BASOPHILS NFR BLD AUTO: 0.6 % (ref 0–1.5)
BILIRUB SERPL-MCNC: 0.4 MG/DL (ref 0.2–1.2)
BILIRUB UR QL STRIP: NEGATIVE
BUN BLD-MCNC: 9 MG/DL (ref 8–23)
BUN/CREAT SERPL: 11.8 (ref 7–25)
CALCIUM SPEC-SCNC: 9.4 MG/DL (ref 8.6–10.5)
CHLORIDE SERPL-SCNC: 94 MMOL/L (ref 98–107)
CLARITY UR: CLEAR
CO2 SERPL-SCNC: 26.7 MMOL/L (ref 22–29)
COLOR UR: YELLOW
CREAT BLD-MCNC: 0.76 MG/DL (ref 0.76–1.27)
DEPRECATED RDW RBC AUTO: 39.3 FL (ref 37–54)
EOSINOPHIL # BLD AUTO: 0.34 10*3/MM3 (ref 0–0.4)
EOSINOPHIL NFR BLD AUTO: 3.1 % (ref 0.3–6.2)
ERYTHROCYTE [DISTWIDTH] IN BLOOD BY AUTOMATED COUNT: 13.1 % (ref 12.3–15.4)
ERYTHROCYTE [SEDIMENTATION RATE] IN BLOOD: 18 MM/HR (ref 0–20)
GFR SERPL CREATININE-BSD FRML MDRD: 104 ML/MIN/1.73
GLOBULIN UR ELPH-MCNC: 3.3 GM/DL
GLUCOSE BLD-MCNC: 101 MG/DL (ref 65–99)
GLUCOSE UR STRIP-MCNC: NEGATIVE MG/DL
HCT VFR BLD AUTO: 41.5 % (ref 37.5–51)
HGB BLD-MCNC: 14.6 G/DL (ref 13–17.7)
HGB UR QL STRIP.AUTO: NEGATIVE
IMM GRANULOCYTES # BLD AUTO: 0.05 10*3/MM3 (ref 0–0.05)
IMM GRANULOCYTES NFR BLD AUTO: 0.5 % (ref 0–0.5)
KETONES UR QL STRIP: NEGATIVE
LEUKOCYTE ESTERASE UR QL STRIP.AUTO: NEGATIVE
LIPASE SERPL-CCNC: 22 U/L (ref 13–60)
LYMPHOCYTES # BLD AUTO: 2.51 10*3/MM3 (ref 0.7–3.1)
LYMPHOCYTES NFR BLD AUTO: 23.2 % (ref 19.6–45.3)
MCH RBC QN AUTO: 28.9 PG (ref 26.6–33)
MCHC RBC AUTO-ENTMCNC: 35.2 G/DL (ref 31.5–35.7)
MCV RBC AUTO: 82.2 FL (ref 79–97)
MONOCYTES # BLD AUTO: 0.81 10*3/MM3 (ref 0.1–0.9)
MONOCYTES NFR BLD AUTO: 7.5 % (ref 5–12)
NEUTROPHILS # BLD AUTO: 7.05 10*3/MM3 (ref 1.7–7)
NEUTROPHILS NFR BLD AUTO: 65.1 % (ref 42.7–76)
NITRITE UR QL STRIP: NEGATIVE
NRBC BLD AUTO-RTO: 0 /100 WBC (ref 0–0.2)
PH UR STRIP.AUTO: 7 [PH] (ref 4.5–8)
PLATELET # BLD AUTO: 292 10*3/MM3 (ref 140–450)
PMV BLD AUTO: 9.5 FL (ref 6–12)
POTASSIUM BLD-SCNC: 3.1 MMOL/L (ref 3.5–5.2)
PROT SERPL-MCNC: 7.6 G/DL (ref 6–8.5)
PROT UR QL STRIP: NEGATIVE
RBC # BLD AUTO: 5.05 10*6/MM3 (ref 4.14–5.8)
SODIUM BLD-SCNC: 136 MMOL/L (ref 136–145)
SP GR UR STRIP: 1.01 (ref 1–1.03)
UROBILINOGEN UR QL STRIP: NORMAL
WBC NRBC COR # BLD: 10.82 10*3/MM3 (ref 3.4–10.8)

## 2020-06-25 PROCEDURE — G0378 HOSPITAL OBSERVATION PER HR: HCPCS

## 2020-06-25 PROCEDURE — 83690 ASSAY OF LIPASE: CPT | Performed by: PHYSICIAN ASSISTANT

## 2020-06-25 PROCEDURE — 74177 CT ABD & PELVIS W/CONTRAST: CPT

## 2020-06-25 PROCEDURE — 0 IOPAMIDOL PER 1 ML: Performed by: EMERGENCY MEDICINE

## 2020-06-25 PROCEDURE — 25010000002 PIPERACILLIN-TAZOBACTAM: Performed by: PHYSICIAN ASSISTANT

## 2020-06-25 PROCEDURE — 99284 EMERGENCY DEPT VISIT MOD MDM: CPT | Performed by: PHYSICIAN ASSISTANT

## 2020-06-25 PROCEDURE — 99284 EMERGENCY DEPT VISIT MOD MDM: CPT

## 2020-06-25 PROCEDURE — 25010000002 ONDANSETRON PER 1 MG: Performed by: EMERGENCY MEDICINE

## 2020-06-25 PROCEDURE — 80053 COMPREHEN METABOLIC PANEL: CPT | Performed by: PHYSICIAN ASSISTANT

## 2020-06-25 PROCEDURE — 81003 URINALYSIS AUTO W/O SCOPE: CPT | Performed by: PHYSICIAN ASSISTANT

## 2020-06-25 PROCEDURE — 85025 COMPLETE CBC W/AUTO DIFF WBC: CPT | Performed by: PHYSICIAN ASSISTANT

## 2020-06-25 PROCEDURE — 85652 RBC SED RATE AUTOMATED: CPT | Performed by: PHYSICIAN ASSISTANT

## 2020-06-25 PROCEDURE — 86140 C-REACTIVE PROTEIN: CPT | Performed by: PHYSICIAN ASSISTANT

## 2020-06-25 PROCEDURE — 25010000002 MORPHINE PER 10 MG: Performed by: EMERGENCY MEDICINE

## 2020-06-25 RX ORDER — ONDANSETRON 2 MG/ML
4 INJECTION INTRAMUSCULAR; INTRAVENOUS ONCE
Status: COMPLETED | OUTPATIENT
Start: 2020-06-25 | End: 2020-06-25

## 2020-06-25 RX ORDER — HYDROMORPHONE HCL 110MG/55ML
0.5 PATIENT CONTROLLED ANALGESIA SYRINGE INTRAVENOUS
Status: DISCONTINUED | OUTPATIENT
Start: 2020-06-25 | End: 2020-06-30 | Stop reason: HOSPADM

## 2020-06-25 RX ORDER — ONDANSETRON 2 MG/ML
4 INJECTION INTRAMUSCULAR; INTRAVENOUS EVERY 4 HOURS PRN
Status: DISCONTINUED | OUTPATIENT
Start: 2020-06-25 | End: 2020-06-30 | Stop reason: HOSPADM

## 2020-06-25 RX ORDER — POTASSIUM CHLORIDE 20 MEQ/1
40 TABLET, EXTENDED RELEASE ORAL ONCE
Status: COMPLETED | OUTPATIENT
Start: 2020-06-25 | End: 2020-06-25

## 2020-06-25 RX ORDER — ACETAMINOPHEN 325 MG/1
650 TABLET ORAL EVERY 4 HOURS PRN
Status: DISCONTINUED | OUTPATIENT
Start: 2020-06-25 | End: 2020-06-30 | Stop reason: HOSPADM

## 2020-06-25 RX ORDER — FAMOTIDINE 10 MG/ML
20 INJECTION, SOLUTION INTRAVENOUS DAILY
Status: DISCONTINUED | OUTPATIENT
Start: 2020-06-26 | End: 2020-06-30 | Stop reason: CLARIF

## 2020-06-25 RX ORDER — SODIUM CHLORIDE 9 MG/ML
50 INJECTION, SOLUTION INTRAVENOUS CONTINUOUS
Status: DISCONTINUED | OUTPATIENT
Start: 2020-06-26 | End: 2020-06-29

## 2020-06-25 RX ORDER — LORAZEPAM 1 MG/1
1 TABLET ORAL 2 TIMES DAILY PRN
Status: DISCONTINUED | OUTPATIENT
Start: 2020-06-25 | End: 2020-06-30 | Stop reason: HOSPADM

## 2020-06-25 RX ADMIN — POTASSIUM CHLORIDE 40 MEQ: 1500 TABLET, EXTENDED RELEASE ORAL at 20:30

## 2020-06-25 RX ADMIN — IOPAMIDOL 100 ML: 755 INJECTION, SOLUTION INTRAVENOUS at 19:31

## 2020-06-25 RX ADMIN — SODIUM CHLORIDE 1000 ML: 9 INJECTION, SOLUTION INTRAVENOUS at 20:15

## 2020-06-25 RX ADMIN — PIPERACILLIN AND TAZOBACTAM 4.5 G: 4; .5 INJECTION, POWDER, LYOPHILIZED, FOR SOLUTION INTRAVENOUS; PARENTERAL at 20:34

## 2020-06-25 RX ADMIN — ONDANSETRON 4 MG: 2 INJECTION, SOLUTION INTRAMUSCULAR; INTRAVENOUS at 20:32

## 2020-06-25 RX ADMIN — FAMOTIDINE 20 MG: 10 INJECTION, SOLUTION INTRAVENOUS at 23:11

## 2020-06-25 RX ADMIN — MORPHINE SULFATE 4 MG: 4 INJECTION, SOLUTION INTRAMUSCULAR; INTRAVENOUS at 20:31

## 2020-06-25 RX ADMIN — SODIUM CHLORIDE 125 ML/HR: 9 INJECTION, SOLUTION INTRAVENOUS at 23:12

## 2020-06-26 LAB
ANION GAP SERPL CALCULATED.3IONS-SCNC: 11 MMOL/L (ref 5–15)
BASOPHILS # BLD AUTO: 0.07 10*3/MM3 (ref 0–0.2)
BASOPHILS NFR BLD AUTO: 0.9 % (ref 0–1.5)
BUN BLD-MCNC: 9 MG/DL (ref 8–23)
BUN/CREAT SERPL: 9.6 (ref 7–25)
CALCIUM SPEC-SCNC: 9.3 MG/DL (ref 8.6–10.5)
CHLORIDE SERPL-SCNC: 105 MMOL/L (ref 98–107)
CO2 SERPL-SCNC: 26 MMOL/L (ref 22–29)
CREAT BLD-MCNC: 0.94 MG/DL (ref 0.76–1.27)
CRP SERPL-MCNC: 6.8 MG/DL (ref 0–0.5)
DEPRECATED RDW RBC AUTO: 41 FL (ref 37–54)
EOSINOPHIL # BLD AUTO: 0.33 10*3/MM3 (ref 0–0.4)
EOSINOPHIL NFR BLD AUTO: 4.4 % (ref 0.3–6.2)
ERYTHROCYTE [DISTWIDTH] IN BLOOD BY AUTOMATED COUNT: 13.2 % (ref 12.3–15.4)
GFR SERPL CREATININE-BSD FRML MDRD: 81 ML/MIN/1.73
GLUCOSE BLD-MCNC: 99 MG/DL (ref 65–99)
HCT VFR BLD AUTO: 43.2 % (ref 37.5–51)
HGB BLD-MCNC: 14.3 G/DL (ref 13–17.7)
IMM GRANULOCYTES # BLD AUTO: 0.01 10*3/MM3 (ref 0–0.05)
IMM GRANULOCYTES NFR BLD AUTO: 0.1 % (ref 0–0.5)
LYMPHOCYTES # BLD AUTO: 2.11 10*3/MM3 (ref 0.7–3.1)
LYMPHOCYTES NFR BLD AUTO: 28.4 % (ref 19.6–45.3)
MCH RBC QN AUTO: 27.9 PG (ref 26.6–33)
MCHC RBC AUTO-ENTMCNC: 33.1 G/DL (ref 31.5–35.7)
MCV RBC AUTO: 84.2 FL (ref 79–97)
MONOCYTES # BLD AUTO: 0.58 10*3/MM3 (ref 0.1–0.9)
MONOCYTES NFR BLD AUTO: 7.8 % (ref 5–12)
NEUTROPHILS # BLD AUTO: 4.34 10*3/MM3 (ref 1.7–7)
NEUTROPHILS NFR BLD AUTO: 58.4 % (ref 42.7–76)
PLATELET # BLD AUTO: 276 10*3/MM3 (ref 140–450)
PMV BLD AUTO: 9.3 FL (ref 6–12)
POTASSIUM BLD-SCNC: 4 MMOL/L (ref 3.5–5.2)
RBC # BLD AUTO: 5.13 10*6/MM3 (ref 4.14–5.8)
SODIUM BLD-SCNC: 142 MMOL/L (ref 136–145)
WBC NRBC COR # BLD: 7.44 10*3/MM3 (ref 3.4–10.8)

## 2020-06-26 PROCEDURE — 85025 COMPLETE CBC W/AUTO DIFF WBC: CPT | Performed by: SURGERY

## 2020-06-26 PROCEDURE — 25010000002 PIPERACILLIN SOD-TAZOBACTAM PER 1 G: Performed by: SURGERY

## 2020-06-26 PROCEDURE — G0378 HOSPITAL OBSERVATION PER HR: HCPCS

## 2020-06-26 PROCEDURE — 99223 1ST HOSP IP/OBS HIGH 75: CPT | Performed by: SURGERY

## 2020-06-26 PROCEDURE — 80048 BASIC METABOLIC PNL TOTAL CA: CPT | Performed by: SURGERY

## 2020-06-26 PROCEDURE — 94799 UNLISTED PULMONARY SVC/PX: CPT

## 2020-06-26 PROCEDURE — 25010000002 HYDROMORPHONE PER 4 MG: Performed by: SURGERY

## 2020-06-26 RX ORDER — NICOTINE 21 MG/24HR
1 PATCH, TRANSDERMAL 24 HOURS TRANSDERMAL
Status: DISCONTINUED | OUTPATIENT
Start: 2020-06-26 | End: 2020-06-30 | Stop reason: HOSPADM

## 2020-06-26 RX ORDER — PIPERACILLIN SODIUM, TAZOBACTAM SODIUM 3; .375 G/15ML; G/15ML
INJECTION, POWDER, LYOPHILIZED, FOR SOLUTION INTRAVENOUS
Status: DISPENSED
Start: 2020-06-26 | End: 2020-06-26

## 2020-06-26 RX ORDER — SODIUM CHLORIDE 9 MG/ML
INJECTION, SOLUTION INTRAVENOUS
Status: DISCONTINUED
Start: 2020-06-26 | End: 2020-06-26 | Stop reason: WASHOUT

## 2020-06-26 RX ORDER — IBUPROFEN 800 MG/1
800 TABLET ORAL 2 TIMES DAILY PRN
Status: DISCONTINUED | OUTPATIENT
Start: 2020-06-26 | End: 2020-06-30 | Stop reason: HOSPADM

## 2020-06-26 RX ORDER — LORAZEPAM 1 MG/1
1 TABLET ORAL 2 TIMES DAILY PRN
Status: DISCONTINUED | OUTPATIENT
Start: 2020-06-26 | End: 2020-06-30 | Stop reason: HOSPADM

## 2020-06-26 RX ORDER — AMLODIPINE BESYLATE 5 MG/1
10 TABLET ORAL DAILY
Status: DISCONTINUED | OUTPATIENT
Start: 2020-06-26 | End: 2020-06-30 | Stop reason: HOSPADM

## 2020-06-26 RX ORDER — SODIUM CHLORIDE 9 MG/ML
INJECTION, SOLUTION INTRAVENOUS
Status: DISPENSED
Start: 2020-06-26 | End: 2020-06-26

## 2020-06-26 RX ADMIN — PIPERACILLIN SODIUM AND TAZOBACTAM SODIUM 3.38 G: 3; .375 INJECTION, POWDER, LYOPHILIZED, FOR SOLUTION INTRAVENOUS at 18:32

## 2020-06-26 RX ADMIN — LISINOPRIL: 10 TABLET ORAL at 16:15

## 2020-06-26 RX ADMIN — PIPERACILLIN SODIUM AND TAZOBACTAM SODIUM 3.38 G: 3; .375 INJECTION, POWDER, LYOPHILIZED, FOR SOLUTION INTRAVENOUS at 09:54

## 2020-06-26 RX ADMIN — SODIUM CHLORIDE 125 ML/HR: 9 INJECTION, SOLUTION INTRAVENOUS at 16:21

## 2020-06-26 RX ADMIN — AMLODIPINE BESYLATE 10 MG: 5 TABLET ORAL at 16:15

## 2020-06-26 RX ADMIN — LORAZEPAM 1 MG: 1 TABLET ORAL at 16:10

## 2020-06-26 RX ADMIN — NICOTINE 1 PATCH: 21 PATCH, EXTENDED RELEASE TRANSDERMAL at 18:32

## 2020-06-26 RX ADMIN — PIPERACILLIN SODIUM AND TAZOBACTAM SODIUM 3.38 G: 3; .375 INJECTION, POWDER, LYOPHILIZED, FOR SOLUTION INTRAVENOUS at 03:10

## 2020-06-26 RX ADMIN — ACETAMINOPHEN 650 MG: 325 TABLET ORAL at 02:58

## 2020-06-26 RX ADMIN — HYDROMORPHONE HYDROCHLORIDE: 2 INJECTION, SOLUTION INTRAMUSCULAR; INTRAVENOUS; SUBCUTANEOUS at 02:58

## 2020-06-27 LAB
BASOPHILS # BLD AUTO: 0.08 10*3/MM3 (ref 0–0.2)
BASOPHILS NFR BLD AUTO: 1.1 % (ref 0–1.5)
DEPRECATED RDW RBC AUTO: 39.9 FL (ref 37–54)
EOSINOPHIL # BLD AUTO: 0.35 10*3/MM3 (ref 0–0.4)
EOSINOPHIL NFR BLD AUTO: 4.9 % (ref 0.3–6.2)
ERYTHROCYTE [DISTWIDTH] IN BLOOD BY AUTOMATED COUNT: 13 % (ref 12.3–15.4)
HCT VFR BLD AUTO: 44 % (ref 37.5–51)
HGB BLD-MCNC: 14.5 G/DL (ref 13–17.7)
IMM GRANULOCYTES # BLD AUTO: 0.02 10*3/MM3 (ref 0–0.05)
IMM GRANULOCYTES NFR BLD AUTO: 0.3 % (ref 0–0.5)
LYMPHOCYTES # BLD AUTO: 1.92 10*3/MM3 (ref 0.7–3.1)
LYMPHOCYTES NFR BLD AUTO: 26.7 % (ref 19.6–45.3)
MCH RBC QN AUTO: 27.7 PG (ref 26.6–33)
MCHC RBC AUTO-ENTMCNC: 33 G/DL (ref 31.5–35.7)
MCV RBC AUTO: 84 FL (ref 79–97)
MONOCYTES # BLD AUTO: 0.49 10*3/MM3 (ref 0.1–0.9)
MONOCYTES NFR BLD AUTO: 6.8 % (ref 5–12)
NEUTROPHILS # BLD AUTO: 4.33 10*3/MM3 (ref 1.7–7)
NEUTROPHILS NFR BLD AUTO: 60.2 % (ref 42.7–76)
NRBC BLD AUTO-RTO: 0 /100 WBC (ref 0–0.2)
PLATELET # BLD AUTO: 286 10*3/MM3 (ref 140–450)
PMV BLD AUTO: 9.5 FL (ref 6–12)
RBC # BLD AUTO: 5.24 10*6/MM3 (ref 4.14–5.8)
WBC NRBC COR # BLD: 7.19 10*3/MM3 (ref 3.4–10.8)

## 2020-06-27 PROCEDURE — 25010000002 PIPERACILLIN SOD-TAZOBACTAM PER 1 G: Performed by: SURGERY

## 2020-06-27 PROCEDURE — 99231 SBSQ HOSP IP/OBS SF/LOW 25: CPT | Performed by: SURGERY

## 2020-06-27 PROCEDURE — G0378 HOSPITAL OBSERVATION PER HR: HCPCS

## 2020-06-27 PROCEDURE — 85025 COMPLETE CBC W/AUTO DIFF WBC: CPT | Performed by: SURGERY

## 2020-06-27 RX ADMIN — NICOTINE 1 PATCH: 21 PATCH, EXTENDED RELEASE TRANSDERMAL at 08:03

## 2020-06-27 RX ADMIN — LISINOPRIL: 10 TABLET ORAL at 08:01

## 2020-06-27 RX ADMIN — PIPERACILLIN SODIUM AND TAZOBACTAM SODIUM 3.38 G: 3; .375 INJECTION, POWDER, LYOPHILIZED, FOR SOLUTION INTRAVENOUS at 10:16

## 2020-06-27 RX ADMIN — PIPERACILLIN SODIUM AND TAZOBACTAM SODIUM 3.38 G: 3; .375 INJECTION, POWDER, LYOPHILIZED, FOR SOLUTION INTRAVENOUS at 18:02

## 2020-06-27 RX ADMIN — SODIUM CHLORIDE 125 ML/HR: 9 INJECTION, SOLUTION INTRAVENOUS at 09:20

## 2020-06-27 RX ADMIN — PIPERACILLIN SODIUM AND TAZOBACTAM SODIUM 3.38 G: 3; .375 INJECTION, POWDER, LYOPHILIZED, FOR SOLUTION INTRAVENOUS at 02:30

## 2020-06-27 RX ADMIN — AMLODIPINE BESYLATE 10 MG: 5 TABLET ORAL at 08:02

## 2020-06-27 RX ADMIN — FAMOTIDINE 20 MG: 10 INJECTION, SOLUTION INTRAVENOUS at 08:06

## 2020-06-28 LAB
BASOPHILS # BLD AUTO: 0.09 10*3/MM3 (ref 0–0.2)
BASOPHILS NFR BLD AUTO: 1.1 % (ref 0–1.5)
DEPRECATED RDW RBC AUTO: 39.1 FL (ref 37–54)
EOSINOPHIL # BLD AUTO: 0.36 10*3/MM3 (ref 0–0.4)
EOSINOPHIL NFR BLD AUTO: 4.6 % (ref 0.3–6.2)
ERYTHROCYTE [DISTWIDTH] IN BLOOD BY AUTOMATED COUNT: 13 % (ref 12.3–15.4)
HCT VFR BLD AUTO: 44.8 % (ref 37.5–51)
HGB BLD-MCNC: 15.1 G/DL (ref 13–17.7)
IMM GRANULOCYTES # BLD AUTO: 0.04 10*3/MM3 (ref 0–0.05)
IMM GRANULOCYTES NFR BLD AUTO: 0.5 % (ref 0–0.5)
LYMPHOCYTES # BLD AUTO: 1.99 10*3/MM3 (ref 0.7–3.1)
LYMPHOCYTES NFR BLD AUTO: 25.2 % (ref 19.6–45.3)
MCH RBC QN AUTO: 28.2 PG (ref 26.6–33)
MCHC RBC AUTO-ENTMCNC: 33.7 G/DL (ref 31.5–35.7)
MCV RBC AUTO: 83.7 FL (ref 79–97)
MONOCYTES # BLD AUTO: 0.58 10*3/MM3 (ref 0.1–0.9)
MONOCYTES NFR BLD AUTO: 7.3 % (ref 5–12)
NEUTROPHILS # BLD AUTO: 4.84 10*3/MM3 (ref 1.7–7)
NEUTROPHILS NFR BLD AUTO: 61.3 % (ref 42.7–76)
NRBC BLD AUTO-RTO: 0 /100 WBC (ref 0–0.2)
PLATELET # BLD AUTO: 318 10*3/MM3 (ref 140–450)
PMV BLD AUTO: 9.6 FL (ref 6–12)
RBC # BLD AUTO: 5.35 10*6/MM3 (ref 4.14–5.8)
WBC NRBC COR # BLD: 7.9 10*3/MM3 (ref 3.4–10.8)

## 2020-06-28 PROCEDURE — 85025 COMPLETE CBC W/AUTO DIFF WBC: CPT | Performed by: SURGERY

## 2020-06-28 PROCEDURE — 25010000002 PIPERACILLIN SOD-TAZOBACTAM PER 1 G: Performed by: SURGERY

## 2020-06-28 PROCEDURE — 99231 SBSQ HOSP IP/OBS SF/LOW 25: CPT | Performed by: SURGERY

## 2020-06-28 RX ADMIN — FAMOTIDINE 20 MG: 10 INJECTION, SOLUTION INTRAVENOUS at 08:32

## 2020-06-28 RX ADMIN — PIPERACILLIN SODIUM AND TAZOBACTAM SODIUM 3.38 G: 3; .375 INJECTION, POWDER, LYOPHILIZED, FOR SOLUTION INTRAVENOUS at 17:40

## 2020-06-28 RX ADMIN — AMLODIPINE BESYLATE 10 MG: 5 TABLET ORAL at 08:32

## 2020-06-28 RX ADMIN — SODIUM CHLORIDE 125 ML/HR: 9 INJECTION, SOLUTION INTRAVENOUS at 19:41

## 2020-06-28 RX ADMIN — SODIUM CHLORIDE 125 ML/HR: 9 INJECTION, SOLUTION INTRAVENOUS at 11:00

## 2020-06-28 RX ADMIN — SODIUM CHLORIDE 125 ML/HR: 9 INJECTION, SOLUTION INTRAVENOUS at 03:37

## 2020-06-28 RX ADMIN — PIPERACILLIN SODIUM AND TAZOBACTAM SODIUM 3.38 G: 3; .375 INJECTION, POWDER, LYOPHILIZED, FOR SOLUTION INTRAVENOUS at 10:40

## 2020-06-28 RX ADMIN — LISINOPRIL: 10 TABLET ORAL at 08:31

## 2020-06-28 RX ADMIN — NICOTINE 1 PATCH: 21 PATCH, EXTENDED RELEASE TRANSDERMAL at 08:28

## 2020-06-28 RX ADMIN — PIPERACILLIN SODIUM AND TAZOBACTAM SODIUM 3.38 G: 3; .375 INJECTION, POWDER, LYOPHILIZED, FOR SOLUTION INTRAVENOUS at 03:00

## 2020-06-29 PROCEDURE — 25010000002 PIPERACILLIN SOD-TAZOBACTAM PER 1 G: Performed by: SURGERY

## 2020-06-29 PROCEDURE — 99231 SBSQ HOSP IP/OBS SF/LOW 25: CPT | Performed by: SURGERY

## 2020-06-29 RX ORDER — LEVOFLOXACIN 750 MG/1
750 TABLET ORAL
Status: DISCONTINUED | OUTPATIENT
Start: 2020-06-29 | End: 2020-06-30 | Stop reason: HOSPADM

## 2020-06-29 RX ORDER — METRONIDAZOLE 500 MG/1
500 TABLET ORAL EVERY 8 HOURS SCHEDULED
Status: DISCONTINUED | OUTPATIENT
Start: 2020-06-29 | End: 2020-06-30 | Stop reason: HOSPADM

## 2020-06-29 RX ADMIN — NICOTINE 1 PATCH: 21 PATCH, EXTENDED RELEASE TRANSDERMAL at 08:22

## 2020-06-29 RX ADMIN — PIPERACILLIN SODIUM AND TAZOBACTAM SODIUM 3.38 G: 3; .375 INJECTION, POWDER, LYOPHILIZED, FOR SOLUTION INTRAVENOUS at 02:22

## 2020-06-29 RX ADMIN — METRONIDAZOLE 500 MG: 500 TABLET ORAL at 21:29

## 2020-06-29 RX ADMIN — FAMOTIDINE 20 MG: 10 INJECTION, SOLUTION INTRAVENOUS at 08:27

## 2020-06-29 RX ADMIN — METRONIDAZOLE 500 MG: 500 TABLET ORAL at 13:30

## 2020-06-29 RX ADMIN — PIPERACILLIN SODIUM AND TAZOBACTAM SODIUM 3.38 G: 3; .375 INJECTION, POWDER, LYOPHILIZED, FOR SOLUTION INTRAVENOUS at 10:38

## 2020-06-29 RX ADMIN — AMLODIPINE BESYLATE 10 MG: 5 TABLET ORAL at 08:21

## 2020-06-29 RX ADMIN — SODIUM CHLORIDE 125 ML/HR: 9 INJECTION, SOLUTION INTRAVENOUS at 02:28

## 2020-06-29 RX ADMIN — LISINOPRIL: 10 TABLET ORAL at 08:21

## 2020-06-29 RX ADMIN — LEVOFLOXACIN 750 MG: 750 TABLET, FILM COATED ORAL at 13:30

## 2020-06-30 VITALS
BODY MASS INDEX: 33.49 KG/M2 | DIASTOLIC BLOOD PRESSURE: 82 MMHG | TEMPERATURE: 97.8 F | RESPIRATION RATE: 18 BRPM | WEIGHT: 213.4 LBS | HEART RATE: 70 BPM | SYSTOLIC BLOOD PRESSURE: 140 MMHG | HEIGHT: 67 IN | OXYGEN SATURATION: 95 %

## 2020-06-30 PROCEDURE — 99238 HOSP IP/OBS DSCHRG MGMT 30/<: CPT | Performed by: SURGERY

## 2020-06-30 RX ORDER — METRONIDAZOLE 500 MG/1
500 TABLET ORAL 3 TIMES DAILY
Qty: 42 TABLET | Refills: 0 | Status: SHIPPED | OUTPATIENT
Start: 2020-06-30 | End: 2020-07-14

## 2020-06-30 RX ORDER — LEVOFLOXACIN 750 MG/1
750 TABLET ORAL DAILY
Qty: 14 TABLET | Refills: 0 | Status: SHIPPED | OUTPATIENT
Start: 2020-06-30 | End: 2020-07-15

## 2020-06-30 RX ORDER — FAMOTIDINE 20 MG/1
20 TABLET, FILM COATED ORAL DAILY
Status: DISCONTINUED | OUTPATIENT
Start: 2020-06-30 | End: 2020-06-30 | Stop reason: HOSPADM

## 2020-06-30 RX ADMIN — METRONIDAZOLE 500 MG: 500 TABLET ORAL at 05:49

## 2020-07-01 ENCOUNTER — READMISSION MANAGEMENT (OUTPATIENT)
Dept: CALL CENTER | Facility: HOSPITAL | Age: 65
End: 2020-07-01

## 2020-07-01 NOTE — OUTREACH NOTE
Prep Survey      Responses   [facility] facility patient discharged from?  [place]   Is LACE score < 7 ?  No   Eligibility  Not Eligible   What are the reasons patient is not eligible?  Other   Does the patient have one of the following disease processes/diagnoses(primary or secondary)?  Other   Prep survey completed?  Yes          Nori Dobbins RN

## 2020-07-02 NOTE — DISCHARGE SUMMARY
Admitting date: 6/25/2020    Discharging date: 6/30/2020    Admitting diagnosis: Complicated diverticulitis with a microperforation    Discharging diagnoses: Same    Admitting/discharging physician: Dr. Haddad    Procedures: None    Hospital course:  This is a pleasant 64-year-old gentleman who presented to the emergency room with a 2-day history of severe bilateral lower quadrant abdominal pain.  Patient reported that he had never had this type of pain in the past and was crampy, throbbing and constant.  Patient underwent a CT scan of the abdomen and pelvis in the emergency room which revealed complicated sigmoid diverticulitis with microperforation.  Patient was admitted to the hospital.  Patient was treated with bowel rest, n.p.o., IV fluids, antibiotics and supportive care.  Patient's symptoms resolved and white count returned to normal.  On 6/30/2020 patient was tolerating a regular diet with a normal white count and normal physical.  Patient was discharged to home in stable condition on Flagyl and ciprofloxacin for 2 weeks.  Patient is scheduled to have a colonoscopy with Dr. Shine in August.  Patient was instructed to follow-up with me in 2 weeks.    Anat Haddad MD  General, Minimally Invasive and Endoscopic Surgery  [facility]    [facility] [address] [place] [address]    [address]  [address] [address]    P: [phone]  F: [phone]    Cc:  Amaury Wynn MD

## 2020-07-02 NOTE — NURSING NOTE
Case Management Discharge Note      Final Note: Discharged home.    Provided Post Acute Provider List?: N/A  N/A Provider List Comment: No needs at this time    Destination      No service has been selected for the patient.      Durable Medical Equipment      No service has been selected for the patient.      Dialysis/Infusion      No service has been selected for the patient.      Home Medical Care      No service has been selected for the patient.      Therapy      No service has been selected for the patient.      Community Resources      No service has been selected for the patient.             Final Discharge Disposition Code: 01 - home or self-care

## 2020-07-15 ENCOUNTER — OFFICE VISIT (OUTPATIENT)
Dept: SURGERY | Facility: CLINIC | Age: 65
End: 2020-07-15

## 2020-07-15 VITALS
RESPIRATION RATE: 18 BRPM | WEIGHT: 218 LBS | TEMPERATURE: 97.3 F | HEIGHT: 67 IN | HEART RATE: 78 BPM | OXYGEN SATURATION: 97 % | DIASTOLIC BLOOD PRESSURE: 78 MMHG | BODY MASS INDEX: 34.21 KG/M2 | SYSTOLIC BLOOD PRESSURE: 132 MMHG

## 2020-07-15 DIAGNOSIS — K57.20 DIVERTICULITIS OF LARGE INTESTINE WITH PERFORATION WITHOUT BLEEDING: Primary | ICD-10-CM

## 2020-07-15 PROCEDURE — 99213 OFFICE O/P EST LOW 20 MIN: CPT | Performed by: SURGERY

## 2020-07-15 NOTE — PROGRESS NOTES
Chief complaint: Following diverticulitis with microperforation    Patient is a 64 y.o. male was treated in the hospital for complicated diverticulitis and a microperforation.  Patient has 1 day left on his Flagyl.  Patient is feeling much better.  Patient denies fever, chills, nausea or vomiting.  Patient is back to having normal bowel movements.  Patient does report he feels bloated after eating.  Patient denies melena, hematochezia or bright red blood per rectum.  Patient has a sister with Crohn's disease.  Patient has previously had colon polyps and is scheduled for a colonoscopy in August with Dr. Shine.    Past Medical History:   Diagnosis Date   • Colon polyp    • Elevated cholesterol    • GERD (gastroesophageal reflux disease)    • Hypertension    • Sleep apnea      Past Surgical History:   Procedure Laterality Date   • BACK SURGERY     • COLONOSCOPY       Family History   Problem Relation Age of Onset   • Crohn's disease Sister    • Colon cancer Neg Hx    • Colon polyps Neg Hx      Social History     Tobacco Use   • Smoking status: Current Every Day Smoker     Packs/day: 1.00     Years: 45.00     Pack years: 45.00     Types: Cigarettes   • Smokeless tobacco: Current User     Types: Chew   Substance Use Topics   • Alcohol use: No     Frequency: Never   • Drug use: Not Currently     No Known Allergies    Current Outpatient Medications:   •  amLODIPine (NORVASC) 10 MG tablet, Take 10 mg by mouth Daily., Disp: , Rfl:   •  atorvastatin (LIPITOR) 20 MG tablet, Take 20 mg by mouth Every Night., Disp: , Rfl:   •  DULoxetine (CYMBALTA) 30 MG capsule, Take 30 mg by mouth Daily., Disp: , Rfl:   •  famotidine (PEPCID) 40 MG tablet, Take 1 tablet by mouth Daily., Disp: 90 tablet, Rfl: 3  •  ibuprofen (ADVIL,MOTRIN) 800 MG tablet, Take 800 mg by mouth 2 (Two) Times a Day As Needed for Mild Pain  or Moderate Pain ., Disp: , Rfl:   •  lisinopril-hydrochlorothiazide (PRINZIDE,ZESTORETIC) 20-25 MG per tablet, Take 1  "tablet by mouth Daily., Disp: , Rfl:   •  LORazepam (ATIVAN) 1 MG tablet, Take 1 mg by mouth 2 (Two) Times a Day As Needed for Anxiety., Disp: , Rfl:   •  Multiple Vitamins-Minerals (CENTRUM SILVER PO), Take 1 tablet by mouth Daily., Disp: , Rfl:   •  omeprazole (priLOSEC) 40 MG capsule, Take 1 capsule by mouth Daily., Disp: 90 capsule, Rfl: 3    Review of Systems  General: Patient reports during good health  Eyes: No eye problems  Ears, nose, mouth and throat: No rhinitis, no hearing problems, no chronic cough  Cardiovascular/heart: Denies palpitations, syncope or chest pain  Respiratory/lung: Denies shortness of breath, hemoptysis, dyspnea on exertion   Genital/urinary: No frequency, hematuria or dysuria  Hematological/lymphatic: Denies anemia or other problems  Musculoskeletal: No joint pain, no defects  Skin: No psoriasis or other skin issues  Neurological: No seizures or other neurological problems  Psychiatric: None  Endocrine: Negative  Gastro-intestinal: No constipation, no diarrhea, no melena, no hematochezia, GERD  Vitals:    07/15/20 1136   BP: 132/78   Pulse: 78   Resp: 18   Temp: 97.3 °F (36.3 °C)   SpO2: 97%   Weight: 98.9 kg (218 lb)   Height: 170.2 cm (67\")       Physical Exam  General/physcological:   Alert and oriented x3, in no acute distress  HEENT: Normal cephalic, atraumatic, PERRLA, EOMI, sclera anicteric, moist mucous membranes, neck is supple, no JVD, no carotid bruits, no thyromegaly no adenopathy  Respiratory: CTA and percussion  CVA: RRR, normal S1-S2, no murmurs, no gallops or rubs  GI: Positive BS, soft, nondistended, nontender, no rebound, no guarding, no hernias, no organomegaly and no masses  Musculoskeletal: Moves all 4 ext, no clubbing, no cyanosis or edema  Neurovascular: Grossly intact  Debilities: none  Emotional behavior: appropriate     Patient does use tobacco products currently.  I have recommended that the patient stop smoking.    Assessment: Plan  History of diverticulitis " with microperforation patient has improved on his antibiotics and is scheduled for a colonoscopy in August.      Anat Haddad MD  General, Minimally Invasive and Endoscopic Surgery  [facility]      [address] [address]    [address]  [address] [address]    P: [phone]  F: [phone]    Cc:  Amaury Wynn MD

## 2020-08-20 ENCOUNTER — TELEPHONE (OUTPATIENT)
Dept: GASTROENTEROLOGY | Facility: CLINIC | Age: 65
End: 2020-08-20

## 2020-08-20 DIAGNOSIS — K22.10 ULCERATIVE ESOPHAGITIS: Primary | ICD-10-CM

## 2020-08-20 RX ORDER — OMEPRAZOLE 40 MG/1
40 CAPSULE, DELAYED RELEASE ORAL 2 TIMES DAILY
Qty: 180 CAPSULE | Refills: 3 | Status: SHIPPED | OUTPATIENT
Start: 2020-08-20 | End: 2020-11-16

## 2020-08-20 NOTE — TELEPHONE ENCOUNTER
CALL FROM PATIENT.  UNABLE TO GET OFF WORK ON 08/28/2020.  HE WILL CALL BACK IN 11/2020 TO SCHEDULE FOR 01/2021.

## 2020-08-20 NOTE — TELEPHONE ENCOUNTER
Call from patient.  He states was switched to Famotidine 40 mg  3 weeks, however it was been on back order with [facility].  Mentioned about changing pharmacy, he does not want to do that.  Since on back order, change prescription?  Please advise.

## 2020-08-20 NOTE — TELEPHONE ENCOUNTER
Have him take his omeprazole 40mg twice a day instead and I will send in a new prescription for it with twice a day on it for when he runs out.

## 2020-08-26 ENCOUNTER — APPOINTMENT (OUTPATIENT)
Dept: LAB | Facility: HOSPITAL | Age: 65
End: 2020-08-26

## 2020-11-14 DIAGNOSIS — K22.10 ULCERATIVE ESOPHAGITIS: ICD-10-CM

## 2020-11-16 RX ORDER — OMEPRAZOLE 40 MG/1
CAPSULE, DELAYED RELEASE ORAL
Qty: 90 CAPSULE | Refills: 3 | Status: SHIPPED | OUTPATIENT
Start: 2020-11-16 | End: 2021-11-08

## 2020-12-07 ENCOUNTER — APPOINTMENT (OUTPATIENT)
Dept: SLEEP MEDICINE | Facility: HOSPITAL | Age: 65
End: 2020-12-07

## 2020-12-17 ENCOUNTER — APPOINTMENT (OUTPATIENT)
Dept: SLEEP MEDICINE | Facility: HOSPITAL | Age: 65
End: 2020-12-17

## 2021-01-15 ENCOUNTER — OFFICE VISIT (OUTPATIENT)
Dept: SLEEP MEDICINE | Facility: HOSPITAL | Age: 66
End: 2021-01-15

## 2021-01-15 VITALS
SYSTOLIC BLOOD PRESSURE: 136 MMHG | DIASTOLIC BLOOD PRESSURE: 83 MMHG | WEIGHT: 221 LBS | BODY MASS INDEX: 34.69 KG/M2 | HEIGHT: 67 IN | HEART RATE: 90 BPM

## 2021-01-15 DIAGNOSIS — G47.33 OSA ON CPAP: Primary | ICD-10-CM

## 2021-01-15 DIAGNOSIS — Z99.89 OSA ON CPAP: Primary | ICD-10-CM

## 2021-01-15 DIAGNOSIS — E66.09 CLASS 1 OBESITY DUE TO EXCESS CALORIES WITHOUT SERIOUS COMORBIDITY WITH BODY MASS INDEX (BMI) OF 33.0 TO 33.9 IN ADULT: ICD-10-CM

## 2021-01-15 PROBLEM — E66.811 CLASS 1 OBESITY DUE TO EXCESS CALORIES WITHOUT SERIOUS COMORBIDITY WITH BODY MASS INDEX (BMI) OF 33.0 TO 33.9 IN ADULT: Status: ACTIVE | Noted: 2021-01-15

## 2021-01-15 PROCEDURE — G0463 HOSPITAL OUTPT CLINIC VISIT: HCPCS

## 2021-01-15 PROCEDURE — 99214 OFFICE O/P EST MOD 30 MIN: CPT | Performed by: INTERNAL MEDICINE

## 2021-01-15 NOTE — PROGRESS NOTES
[facility]  [address]  [address] [facility] [place] [address]  Phone   Fax         SLEEP CLINIC FOLLOW UP PROGRESS NOTE.    Trenton Sears  1955  65 y.o.  male      PCP: Amaury Wynn MD      Date of visit: 1/15/2021    Chief Complaint   Patient presents with   • Sleep Apnea   • Follow-up       INTERM HISTORY:  This is a 65 y.o. years old patient who has a history of sleep apnea and is on CPAP.  Patient reports good compliance with the device. Patient has severe sleep apnea with AHI 53/h. Used to see Dr. Stevens and was using the machine on a regular basis for the last June he had perforated diverticulum and was in the hospital which was treated conservatively. However after this episode patient did not use them CPAP on a regular basis. He needs supplies and wants to use the CPAP as it was helping him. He works in a steel factory.    Sleep schedule  Normally goes to bed at 7 PM  Wakes up at 3 AM  Do you feel refreshed after waking up ?: No      The Smart card downloaded on 1/15/2021 has been reviewed by me and shows the following..  Poor compliance  Mask type: Dream wear full face mask  DME: [facility]      Past Medical History:   Diagnosis Date   • Colon polyp    • Elevated cholesterol    • GERD (gastroesophageal reflux disease)    • Hypertension    • CALLY on CPAP     AHI 53/h       MEDICATIONS: reviewed by me    Current Outpatient Medications:   •  amLODIPine (NORVASC) 10 MG tablet, Take 10 mg by mouth Daily., Disp: , Rfl:   •  atorvastatin (LIPITOR) 20 MG tablet, Take 20 mg by mouth Every Night., Disp: , Rfl:   •  DULoxetine (CYMBALTA) 30 MG capsule, Take 30 mg by mouth Daily., Disp: , Rfl:   •  famotidine (PEPCID) 40 MG tablet, Take 1 tablet by mouth Daily., Disp: 90 tablet, Rfl: 3  •  ibuprofen (ADVIL,MOTRIN) 800 MG tablet, Take 800 mg by mouth 2 (Two) Times a Day As Needed for Mild Pain  or Moderate Pain ., Disp: , Rfl:   •  lisinopril-hydrochlorothiazide  "(PRINZIDE,ZESTORETIC) 20-25 MG per tablet, Take 1 tablet by mouth Daily., Disp: , Rfl:   •  LORazepam (ATIVAN) 1 MG tablet, Take 1 mg by mouth 2 (Two) Times a Day As Needed for Anxiety., Disp: , Rfl:   •  Multiple Vitamins-Minerals (CENTRUM SILVER PO), Take 1 tablet by mouth Daily., Disp: , Rfl:   •  omeprazole (priLOSEC) 40 MG capsule, TAKE ONE CAPSULE BY MOUTH DAILY, Disp: 90 capsule, Rfl: 3    No Known Allergies reviewed by me    SOCIAL, FAMILY HISTORY: Medical records are reviewed and noted by me.  History of smoking yes  History of alcohol use no  Caffeine use 1 L of soda    REVIEW OF SYSTEMS:   [place] Sleepiness Scale :Total score: 5   Snoring: Yes without the CPAP  Morning headache: No  Nasal congestion: No  Leg movements: No  Number of times you wake up once asleep: 3-4  Heart burn no    PHYSICAL EXAMINATION:  Vitals:    01/15/21 0900   BP: 136/83   Pulse: 90   Weight: 100 kg (221 lb)   Height: 170.2 cm (67\")    Body mass index is 34.61 kg/m².    HEENT: pupils are round equal and reacting to light and accommodation, nasal passage is clear, no nasal polyps, no lymphadenopathy, throat is clear, oral airway Mallampati class 3  RESPRATORY SYSTEM: Breath sounds are equal on both sides and are normal, no wheezes or crackles  CARDIOVASULAR SYSTEM: Heart rate is regular without murmur  ABDOMEN: Soft, no ascites, no hepatosplenomegaly.  EXTREMITIES: No cyanosis, clubbing or edema       ASSESSMENT AND PLAN:  · Obstructive sleep apnea, patient is using the device with poor compliance for treatment of sleep apnea. He needs supplies. I have sent a order to get the supplies from your care and see me back in about 2 months for follow-up. I have talked to the patient about the consequences of untreated sleep apnea and the importance of using the CPAP as the patient has severe sleep apnea.  · Obesity, patient's BMI is Body mass index is 34.61 kg/m²..  I have discussed weight reduction and the health benefits.  I have also " discuss the relationship between the weight and sleep apnea and encouraged the patient to lose weight which is beneficial in treating sleep apnea. Discussed diet and exercise with the patient.  · Return to clinic in 2 months for follow-up        Betito Soares MD  Sleep Medicine  Medical Director for [facility]  1/15/2021

## 2021-01-19 ENCOUNTER — APPOINTMENT (OUTPATIENT)
Dept: GENERAL RADIOLOGY | Facility: HOSPITAL | Age: 66
End: 2021-01-19

## 2021-01-19 ENCOUNTER — HOSPITAL ENCOUNTER (EMERGENCY)
Facility: HOSPITAL | Age: 66
Discharge: HOME | End: 2021-01-19
Attending: EMERGENCY MEDICINE | Admitting: EMERGENCY MEDICINE

## 2021-01-19 VITALS
HEART RATE: 71 BPM | WEIGHT: 220 LBS | SYSTOLIC BLOOD PRESSURE: 153 MMHG | HEIGHT: 67 IN | TEMPERATURE: 98.7 F | OXYGEN SATURATION: 96 % | DIASTOLIC BLOOD PRESSURE: 94 MMHG | BODY MASS INDEX: 34.53 KG/M2 | RESPIRATION RATE: 20 BRPM

## 2021-01-19 DIAGNOSIS — R07.9 CHEST PAIN, UNSPECIFIED TYPE: Primary | ICD-10-CM

## 2021-01-19 LAB
ALBUMIN SERPL-MCNC: 4.6 G/DL (ref 3.5–5.2)
ALBUMIN/GLOB SERPL: 1.5 G/DL
ALP SERPL-CCNC: 64 U/L (ref 39–117)
ALT SERPL W P-5'-P-CCNC: 33 U/L (ref 1–41)
ANION GAP SERPL CALCULATED.3IONS-SCNC: 9.9 MMOL/L (ref 5–15)
AST SERPL-CCNC: 23 U/L (ref 1–40)
BASOPHILS # BLD AUTO: 0.09 10*3/MM3 (ref 0–0.2)
BASOPHILS NFR BLD AUTO: 1.2 % (ref 0–1.5)
BILIRUB SERPL-MCNC: 0.2 MG/DL (ref 0–1.2)
BILIRUB UR QL STRIP: NEGATIVE
BUN SERPL-MCNC: 7 MG/DL (ref 8–23)
BUN/CREAT SERPL: 9.1 (ref 7–25)
CALCIUM SPEC-SCNC: 9.7 MG/DL (ref 8.6–10.5)
CHLORIDE SERPL-SCNC: 98 MMOL/L (ref 98–107)
CLARITY UR: CLEAR
CO2 SERPL-SCNC: 27.1 MMOL/L (ref 22–29)
COLOR UR: NORMAL
CREAT SERPL-MCNC: 0.77 MG/DL (ref 0.76–1.27)
D DIMER PPP FEU-MCNC: <0.27 MCGFEU/ML (ref 0–0.46)
DEPRECATED RDW RBC AUTO: 37.2 FL (ref 37–54)
EOSINOPHIL # BLD AUTO: 0.38 10*3/MM3 (ref 0–0.4)
EOSINOPHIL NFR BLD AUTO: 4.9 % (ref 0.3–6.2)
ERYTHROCYTE [DISTWIDTH] IN BLOOD BY AUTOMATED COUNT: 12.1 % (ref 12.3–15.4)
GFR SERPL CREATININE-BSD FRML MDRD: 102 ML/MIN/1.73
GLOBULIN UR ELPH-MCNC: 3 GM/DL
GLUCOSE SERPL-MCNC: 100 MG/DL (ref 65–99)
GLUCOSE UR STRIP-MCNC: NEGATIVE MG/DL
HCT VFR BLD AUTO: 44.5 % (ref 37.5–51)
HGB BLD-MCNC: 15.3 G/DL (ref 13–17.7)
HGB UR QL STRIP.AUTO: NEGATIVE
IMM GRANULOCYTES # BLD AUTO: 0.01 10*3/MM3 (ref 0–0.05)
IMM GRANULOCYTES NFR BLD AUTO: 0.1 % (ref 0–0.5)
KETONES UR QL STRIP: NEGATIVE
LEUKOCYTE ESTERASE UR QL STRIP.AUTO: NEGATIVE
LIPASE SERPL-CCNC: 29 U/L (ref 13–60)
LYMPHOCYTES # BLD AUTO: 2.47 10*3/MM3 (ref 0.7–3.1)
LYMPHOCYTES NFR BLD AUTO: 31.8 % (ref 19.6–45.3)
MCH RBC QN AUTO: 28.6 PG (ref 26.6–33)
MCHC RBC AUTO-ENTMCNC: 34.4 G/DL (ref 31.5–35.7)
MCV RBC AUTO: 83.2 FL (ref 79–97)
MONOCYTES # BLD AUTO: 0.63 10*3/MM3 (ref 0.1–0.9)
MONOCYTES NFR BLD AUTO: 8.1 % (ref 5–12)
NEUTROPHILS NFR BLD AUTO: 4.19 10*3/MM3 (ref 1.7–7)
NEUTROPHILS NFR BLD AUTO: 53.9 % (ref 42.7–76)
NITRITE UR QL STRIP: NEGATIVE
PH UR STRIP.AUTO: 7 [PH] (ref 4.5–8)
PLATELET # BLD AUTO: 304 10*3/MM3 (ref 140–450)
PMV BLD AUTO: 9.1 FL (ref 6–12)
POTASSIUM SERPL-SCNC: 3.3 MMOL/L (ref 3.5–5.2)
PROT SERPL-MCNC: 7.6 G/DL (ref 6–8.5)
PROT UR QL STRIP: NEGATIVE
QT INTERVAL: 390 MS
RBC # BLD AUTO: 5.35 10*6/MM3 (ref 4.14–5.8)
SODIUM SERPL-SCNC: 135 MMOL/L (ref 136–145)
SP GR UR STRIP: 1.01 (ref 1–1.03)
TROPONIN T SERPL-MCNC: <0.01 NG/ML (ref 0–0.03)
UROBILINOGEN UR QL STRIP: NORMAL
WBC # BLD AUTO: 7.77 10*3/MM3 (ref 3.4–10.8)

## 2021-01-19 PROCEDURE — 81003 URINALYSIS AUTO W/O SCOPE: CPT | Performed by: PHYSICIAN ASSISTANT

## 2021-01-19 PROCEDURE — 83690 ASSAY OF LIPASE: CPT | Performed by: PHYSICIAN ASSISTANT

## 2021-01-19 PROCEDURE — 85379 FIBRIN DEGRADATION QUANT: CPT | Performed by: PHYSICIAN ASSISTANT

## 2021-01-19 PROCEDURE — 93010 ELECTROCARDIOGRAM REPORT: CPT | Performed by: INTERNAL MEDICINE

## 2021-01-19 PROCEDURE — 71046 X-RAY EXAM CHEST 2 VIEWS: CPT

## 2021-01-19 PROCEDURE — 93005 ELECTROCARDIOGRAM TRACING: CPT | Performed by: EMERGENCY MEDICINE

## 2021-01-19 PROCEDURE — 84484 ASSAY OF TROPONIN QUANT: CPT | Performed by: PHYSICIAN ASSISTANT

## 2021-01-19 PROCEDURE — 96374 THER/PROPH/DIAG INJ IV PUSH: CPT

## 2021-01-19 PROCEDURE — 80053 COMPREHEN METABOLIC PANEL: CPT | Performed by: PHYSICIAN ASSISTANT

## 2021-01-19 PROCEDURE — 25010000002 KETOROLAC TROMETHAMINE PER 15 MG: Performed by: EMERGENCY MEDICINE

## 2021-01-19 PROCEDURE — 99283 EMERGENCY DEPT VISIT LOW MDM: CPT

## 2021-01-19 PROCEDURE — 85025 COMPLETE CBC W/AUTO DIFF WBC: CPT | Performed by: PHYSICIAN ASSISTANT

## 2021-01-19 RX ORDER — SODIUM CHLORIDE 0.9 % (FLUSH) 0.9 %
10 SYRINGE (ML) INJECTION AS NEEDED
Status: DISCONTINUED | OUTPATIENT
Start: 2021-01-19 | End: 2021-01-19 | Stop reason: HOSPADM

## 2021-01-19 RX ORDER — NAPROXEN 500 MG/1
500 TABLET ORAL 2 TIMES DAILY WITH MEALS
Qty: 14 TABLET | Refills: 0 | Status: SHIPPED | OUTPATIENT
Start: 2021-01-19 | End: 2022-12-14

## 2021-01-19 RX ORDER — KETOROLAC TROMETHAMINE 30 MG/ML
30 INJECTION, SOLUTION INTRAMUSCULAR; INTRAVENOUS ONCE
Status: COMPLETED | OUTPATIENT
Start: 2021-01-19 | End: 2021-01-19

## 2021-01-19 RX ADMIN — SODIUM CHLORIDE 1000 ML: 9 INJECTION, SOLUTION INTRAVENOUS at 14:39

## 2021-01-19 RX ADMIN — KETOROLAC TROMETHAMINE 30 MG: 30 INJECTION, SOLUTION INTRAMUSCULAR at 14:47

## 2021-01-19 NOTE — ED PROVIDER NOTES
EMERGENCY DEPARTMENT ENCOUNTER      Room Number: 10/10    History is provided by the patient, no translation services needed    HPI:    Chief complaint: Chest pain    Location: Chest    Quality/Severity: Aching    Timing/Duration: 2 weeks/constant    Modifying Factors: Worse with palpation    Associated Symptoms: Nausea, no vomiting, no shortness of breath    Narrative: Pt is a 65 y.o. male who presents complaining of chest pain x2 weeks.  Patient states that he has pain that goes from his chest down his right arm and wraps around to his right upper back.  Patient denies any injury.  Patient states that he recently had Covid.  Patient states that he had one episode of nausea but no vomiting.  Patient denies any shortness of breath.      PMD: Amaury Wynn MD    REVIEW OF SYSTEMS  Review of Systems   Constitutional: Negative for chills and fever.   Eyes: Negative for pain and visual disturbance.   Respiratory: Negative for cough, chest tightness and shortness of breath.    Cardiovascular: Positive for chest pain. Negative for palpitations.   Gastrointestinal: Positive for nausea. Negative for abdominal pain and vomiting.   Genitourinary: Negative for difficulty urinating, dysuria and flank pain.   Musculoskeletal: Negative for gait problem and myalgias.   Skin: Negative for rash and wound.   Neurological: Negative for dizziness, syncope, numbness and headaches.   Psychiatric/Behavioral: Negative for confusion and suicidal ideas. The patient is not nervous/anxious.          PAST MEDICAL HISTORY  Active Ambulatory Problems     Diagnosis Date Noted   • Gastroesophageal reflux disease 06/20/2019   • Rectal bleeding 06/20/2019   • Ulcerative esophagitis 12/16/2019   • Personal history of colonic polyps 12/16/2019   • CALLY on CPAP 02/20/2020   • Acute diverticulitis 06/25/2020   • Class 1 obesity due to excess calories without serious comorbidity with body mass index (BMI) of 33.0 to 33.9 in adult 01/15/2021      Resolved Ambulatory Problems     Diagnosis Date Noted   • No Resolved Ambulatory Problems     Past Medical History:   Diagnosis Date   • Colon polyp    • Elevated cholesterol    • GERD (gastroesophageal reflux disease)    • Hypertension        PAST SURGICAL HISTORY  Past Surgical History:   Procedure Laterality Date   • BACK SURGERY     • COLONOSCOPY         FAMILY HISTORY  Family History   Problem Relation Age of Onset   • Crohn's disease Sister    • Colon cancer Neg Hx    • Colon polyps Neg Hx        SOCIAL HISTORY  Social History     Socioeconomic History   • Marital status:      Spouse name: Not on file   • Number of children: Not on file   • Years of education: Not on file   • Highest education level: Not on file   Tobacco Use   • Smoking status: Current Every Day Smoker     Packs/day: 1.00     Years: 45.00     Pack years: 45.00     Types: Cigarettes   • Smokeless tobacco: Current User     Types: Chew   Substance and Sexual Activity   • Alcohol use: No     Frequency: Never   • Drug use: Not Currently   • Sexual activity: Defer       ALLERGIES  Patient has no known allergies.    No current facility-administered medications for this encounter.     Current Outpatient Medications:   •  amLODIPine (NORVASC) 10 MG tablet, Take 10 mg by mouth Daily., Disp: , Rfl:   •  atorvastatin (LIPITOR) 20 MG tablet, Take 20 mg by mouth Every Night., Disp: , Rfl:   •  DULoxetine (CYMBALTA) 30 MG capsule, Take 30 mg by mouth Daily., Disp: , Rfl:   •  famotidine (PEPCID) 40 MG tablet, Take 1 tablet by mouth Daily., Disp: 90 tablet, Rfl: 3  •  ibuprofen (ADVIL,MOTRIN) 800 MG tablet, Take 800 mg by mouth 2 (Two) Times a Day As Needed for Mild Pain  or Moderate Pain ., Disp: , Rfl:   •  lisinopril-hydrochlorothiazide (PRINZIDE,ZESTORETIC) 20-25 MG per tablet, Take 1 tablet by mouth Daily., Disp: , Rfl:   •  LORazepam (ATIVAN) 1 MG tablet, Take 1 mg by mouth 2 (Two) Times a Day As Needed for Anxiety., Disp: , Rfl:   •   Multiple Vitamins-Minerals (CENTRUM SILVER PO), Take 1 tablet by mouth Daily., Disp: , Rfl:   •  naproxen (EC NAPROSYN) 500 MG EC tablet, Take 1 tablet by mouth 2 (Two) Times a Day With Meals., Disp: 14 tablet, Rfl: 0  •  omeprazole (priLOSEC) 40 MG capsule, TAKE ONE CAPSULE BY MOUTH DAILY, Disp: 90 capsule, Rfl: 3    PHYSICAL EXAM  ED Triage Vitals [01/19/21 1310]   Temp Heart Rate Resp BP SpO2   98.7 °F (37.1 °C) 86 16 162/90 97 %      Temp src Heart Rate Source Patient Position BP Location FiO2 (%)   Oral Monitor -- -- --       Physical Exam   Constitutional: He is oriented to person, place, and time and well-developed, well-nourished, and in no distress.   HENT:   Head: Normocephalic and atraumatic.   Eyes: Conjunctivae and EOM are normal.   Neck: Normal range of motion. Neck supple.   Cardiovascular: Normal rate, regular rhythm and normal heart sounds.   Pulmonary/Chest: Effort normal and breath sounds normal. No respiratory distress. He exhibits tenderness.   Abdominal: Soft. Bowel sounds are normal. He exhibits no distension. There is no abdominal tenderness.   Musculoskeletal: Normal range of motion.         General: No edema.   Neurological: He is alert and oriented to person, place, and time. GCS score is 15.   Skin: Skin is warm and dry.   Psychiatric: Mood and affect normal.   Nursing note and vitals reviewed.        LAB RESULTS  Lab Results (last 24 hours)     Procedure Component Value Units Date/Time    CBC & Differential [[id number]]  (Abnormal) Collected: 01/19/21 1339    Specimen: Blood Updated: 01/19/21 1352    Narrative:      The following orders were created for panel order CBC & Differential.  Procedure                               Abnormality         Status                     ---------                               -----------         ------                     CBC Auto Differential[[id number]]        Abnormal            Final result                 Please view results for these tests on the  individual orders.    Comprehensive Metabolic Panel [[id number]]  (Abnormal) Collected: 01/19/21 1339    Specimen: Blood Updated: 01/19/21 1412     Glucose 100 mg/dL      BUN 7 mg/dL      Creatinine 0.77 mg/dL      Sodium 135 mmol/L      Potassium 3.3 mmol/L      Chloride 98 mmol/L      CO2 27.1 mmol/L      Calcium 9.7 mg/dL      Total Protein 7.6 g/dL      Albumin 4.60 g/dL      ALT (SGPT) 33 U/L      AST (SGOT) 23 U/L      Alkaline Phosphatase 64 U/L      Total Bilirubin 0.2 mg/dL      eGFR Non African Amer 102 mL/min/1.73      Globulin 3.0 gm/dL      A/G Ratio 1.5 g/dL      BUN/Creatinine Ratio 9.1     Anion Gap 9.9 mmol/L     Narrative:      GFR Normal >60  Chronic Kidney Disease <60  Kidney Failure <15      Lipase [[id number]]  (Normal) Collected: 01/19/21 1339    Specimen: Blood Updated: 01/19/21 1412     Lipase 29 U/L     Troponin [[id number]]  (Normal) Collected: 01/19/21 1339    Specimen: Blood Updated: 01/19/21 1413     Troponin T <0.010 ng/mL     Narrative:      Troponin T Reference Range:  <= 0.03 ng/mL-   Negative for AMI  >0.03 ng/mL-     Abnormal for myocardial necrosis.  Clinicians would have to utilize clinical acumen, EKG, Troponin and serial changes to determine if it is an Acute Myocardial Infarction or myocardial injury due to an underlying chronic condition.       Results may be falsely decreased if patient taking Biotin.      CBC Auto Differential [[id number]]  (Abnormal) Collected: 01/19/21 1339    Specimen: Blood Updated: 01/19/21 1352     WBC 7.77 10*3/mm3      RBC 5.35 10*6/mm3      Hemoglobin 15.3 g/dL      Hematocrit 44.5 %      MCV 83.2 fL      MCH 28.6 pg      MCHC 34.4 g/dL      RDW 12.1 %      RDW-SD 37.2 fl      MPV 9.1 fL      Platelets 304 10*3/mm3      Neutrophil % 53.9 %      Lymphocyte % 31.8 %      Monocyte % 8.1 %      Eosinophil % 4.9 %      Basophil % 1.2 %      Immature Grans % 0.1 %      Neutrophils, Absolute 4.19 10*3/mm3      Lymphocytes, Absolute 2.47 10*3/mm3       Monocytes, Absolute 0.63 10*3/mm3      Eosinophils, Absolute 0.38 10*3/mm3      Basophils, Absolute 0.09 10*3/mm3      Immature Grans, Absolute 0.01 10*3/mm3     D-dimer, Quantitative [[id number]]  (Normal) Collected: 01/19/21 1339    Specimen: Blood Updated: 01/19/21 1408     D-Dimer, Quantitative <0.27 MCGFEU/mL     Narrative:      Can be elevated in, but is not diagnostic for deep vein thrombosis (DVT) or pulmonary embolis (PE).  It is also elevated in other medical conditions.  Clinical correlation is required.  The negative cut-off value for the D-Dimer is 0.50 mcg FEU/mL for DVT and PE.      Urinalysis With Microscopic If Indicated (No Culture) - Urine, Clean Catch [[id number]]  (Normal) Collected: 01/19/21 1341    Specimen: Urine, Clean Catch Updated: 01/19/21 1353     Color, UA Straw     Appearance, UA Clear     pH, UA 7.0     Specific Gravity, UA 1.015     Glucose, UA Negative     Ketones, UA Negative     Bilirubin, UA Negative     Blood, UA Negative     Protein, UA Negative     Leuk Esterase, UA Negative     Nitrite, UA Negative     Urobilinogen, UA 0.2 E.U./dL    Narrative:      Urine microscopic not indicated.            I ordered the above labs and reviewed the results    RADIOLOGY  Xr Chest 2 View    Result Date: 1/19/2021  CHEST X-RAY, 01/19/2021     HISTORY: 65-year-old male in the ED complaining of several month history of intermittent right-sided chest pain. Past history myocardial infarction. Smoker.  TECHNIQUE: PA lateral upright chest series.  FINDINGS: Heart size and vascularity are within normal limits. Prominent central pulmonary arteries. The lungs appear clear. No visible pulmonary infiltrate or pleural effusion.      No active disease in the chest.  This report was finalized on 1/19/2021 2:32 PM by Dr. Carlos Gordon MD.        I ordered the above radiologic testing and reviewed the results    PROCEDURES  Procedures      PROGRESS AND CONSULTS  ED Course as of Jan 19 2234   Tue Jan 19,  2021 1448 EKG         EKG time / Interpretation time: 1329/1332  Rhythm/Rate: Sinus rhythm/79   VT: 169  QRS, axis: 26, normal axis  QTc 447  ST and T waves: No significant ST elevation or depression, no T wave abnormality  EKG Tracing Interpreted Contemporaneously by me, independently viewed by me and MD.  No significant change from previous EKG of 9/22/15      [GT]   9666 65-year-old male presents to the ED with complaints of right chest pain radiating to his back.  Patient states that this has been going on for 2 weeks.  Patient denied any shortness of breath.  Patient dates his pain was worse with palpation.  After history and physical exam patient is noted to have no rash or any signs of herpes zoster.  Patient is noted to have no signs of ecchymosis or erythema.  Patient is noted to have tenderness with palpation.  Patient is noted to have full range of motion of his right shoulder.  Laboratory tests were done to rule out any ischemic event.  Troponin was negative.  Patient's EKG showed no signs of acute ischemia.  Patient also had a D-dimer that was negative with.  Patient's heart score was 3.  Discussed with patient that if his symptoms worsen that he should return to the ED.  Instructed patient that he would need to follow-up with his PCP for further evaluation.  Patient expressed verbal understanding of plan of care.    [GT]      ED Course User Index  [GT] Asim Obrien, VON           MEDICAL DECISION MAKING  Results were reviewed/discussed with the patient and they were also made aware of online access. Pt also made aware that some labs, such as cultures, will not be resulted during ER visit and follow up with PMD is necessary.     [facility]      HEART Score (for prediction of 6-week risk of major adverse cardiac event) reviewed and/or performed as part of the patient evaluation and treatment planning process.  The result associated with this review/performance is: 3      DIAGNOSIS  Final diagnoses:    Chest pain, unspecified type       Latest Documented Vital Signs:  As of 22:34 EST  BP- 153/94 HR- 71 Temp- 98.7 °F (37.1 °C) (Oral) O2 sat- 96%    DISPOSITION  Discharged home        Discussed pertinent labs and imaging findings with the patient/family.  Patient/Family voiced understanding of need to follow-up for recheck, further testing as needed.  Return to the emergency Department warnings were given.         Medication List      New Prescriptions    naproxen 500 MG EC tablet  Commonly known as: EC NAPROSYN  Take 1 tablet by mouth 2 (Two) Times a Day With Meals.           Where to Get Your Medications      These medications were sent to [place] [address] [address] [facility] - [phone]  - [phone] [address] [address]    Phone: [phone]   · naproxen 500 MG EC tablet             Follow-up Information     Amaury Wynn MD. Call in 1 day.    Specialty: Family Medicine  Why: To schedule a follow up appointment  Contact information:  [address]  [facility] [address] [phone]                     Dictated utilizing Dragon dictation     Asim Obrien PA-C  01/19/21 9338

## 2021-03-05 ENCOUNTER — APPOINTMENT (OUTPATIENT)
Dept: SLEEP MEDICINE | Facility: HOSPITAL | Age: 66
End: 2021-03-05

## 2021-04-02 ENCOUNTER — APPOINTMENT (OUTPATIENT)
Dept: SLEEP MEDICINE | Facility: HOSPITAL | Age: 66
End: 2021-04-02

## 2021-05-10 RX ORDER — FAMOTIDINE 40 MG/1
TABLET, FILM COATED ORAL
Qty: 90 TABLET | Refills: 4 | Status: SHIPPED | OUTPATIENT
Start: 2021-05-10 | End: 2022-05-16

## 2021-08-18 ENCOUNTER — HOSPITAL ENCOUNTER (EMERGENCY)
Facility: HOSPITAL | Age: 66
Discharge: HOME | End: 2021-08-18
Attending: EMERGENCY MEDICINE | Admitting: EMERGENCY MEDICINE

## 2021-08-18 ENCOUNTER — APPOINTMENT (OUTPATIENT)
Dept: CT IMAGING | Facility: HOSPITAL | Age: 66
End: 2021-08-18

## 2021-08-18 VITALS
HEIGHT: 67 IN | TEMPERATURE: 98 F | SYSTOLIC BLOOD PRESSURE: 147 MMHG | BODY MASS INDEX: 32.65 KG/M2 | DIASTOLIC BLOOD PRESSURE: 104 MMHG | OXYGEN SATURATION: 95 % | WEIGHT: 208 LBS | HEART RATE: 81 BPM | RESPIRATION RATE: 16 BRPM

## 2021-08-18 DIAGNOSIS — E87.6 HYPOKALEMIA: ICD-10-CM

## 2021-08-18 DIAGNOSIS — R10.32 LEFT LOWER QUADRANT ABDOMINAL PAIN: ICD-10-CM

## 2021-08-18 DIAGNOSIS — R00.2 PALPITATIONS: Primary | ICD-10-CM

## 2021-08-18 DIAGNOSIS — R53.1 GENERALIZED WEAKNESS: ICD-10-CM

## 2021-08-18 LAB
ALBUMIN SERPL-MCNC: 4.7 G/DL (ref 3.5–5.2)
ALBUMIN/GLOB SERPL: 1.9 G/DL
ALP SERPL-CCNC: 55 U/L (ref 39–117)
ALT SERPL W P-5'-P-CCNC: 36 U/L (ref 1–41)
ANION GAP SERPL CALCULATED.3IONS-SCNC: 9.9 MMOL/L (ref 5–15)
AST SERPL-CCNC: 21 U/L (ref 1–40)
BASOPHILS # BLD AUTO: 0.07 10*3/MM3 (ref 0–0.2)
BASOPHILS NFR BLD AUTO: 0.7 % (ref 0–1.5)
BILIRUB SERPL-MCNC: 0.3 MG/DL (ref 0–1.2)
BILIRUB UR QL STRIP: NEGATIVE
BUN SERPL-MCNC: 8 MG/DL (ref 8–23)
BUN/CREAT SERPL: 9.9 (ref 7–25)
CALCIUM SPEC-SCNC: 10.1 MG/DL (ref 8.6–10.5)
CHLORIDE SERPL-SCNC: 95 MMOL/L (ref 98–107)
CLARITY UR: CLEAR
CO2 SERPL-SCNC: 28.1 MMOL/L (ref 22–29)
COLOR UR: YELLOW
CREAT SERPL-MCNC: 0.81 MG/DL (ref 0.76–1.27)
DEPRECATED RDW RBC AUTO: 39.2 FL (ref 37–54)
EOSINOPHIL # BLD AUTO: 0.29 10*3/MM3 (ref 0–0.4)
EOSINOPHIL NFR BLD AUTO: 2.7 % (ref 0.3–6.2)
ERYTHROCYTE [DISTWIDTH] IN BLOOD BY AUTOMATED COUNT: 13.3 % (ref 12.3–15.4)
GFR SERPL CREATININE-BSD FRML MDRD: 96 ML/MIN/1.73
GLOBULIN UR ELPH-MCNC: 2.5 GM/DL
GLUCOSE SERPL-MCNC: 102 MG/DL (ref 65–99)
GLUCOSE UR STRIP-MCNC: NEGATIVE MG/DL
HCT VFR BLD AUTO: 44.1 % (ref 37.5–51)
HGB BLD-MCNC: 15.2 G/DL (ref 13–17.7)
HGB UR QL STRIP.AUTO: NEGATIVE
IMM GRANULOCYTES # BLD AUTO: 0.02 10*3/MM3 (ref 0–0.05)
IMM GRANULOCYTES NFR BLD AUTO: 0.2 % (ref 0–0.5)
KETONES UR QL STRIP: NEGATIVE
LEUKOCYTE ESTERASE UR QL STRIP.AUTO: NEGATIVE
LYMPHOCYTES # BLD AUTO: 2.69 10*3/MM3 (ref 0.7–3.1)
LYMPHOCYTES NFR BLD AUTO: 25.3 % (ref 19.6–45.3)
MCH RBC QN AUTO: 27.9 PG (ref 26.6–33)
MCHC RBC AUTO-ENTMCNC: 34.5 G/DL (ref 31.5–35.7)
MCV RBC AUTO: 81.1 FL (ref 79–97)
MONOCYTES # BLD AUTO: 0.7 10*3/MM3 (ref 0.1–0.9)
MONOCYTES NFR BLD AUTO: 6.6 % (ref 5–12)
NEUTROPHILS NFR BLD AUTO: 6.88 10*3/MM3 (ref 1.7–7)
NEUTROPHILS NFR BLD AUTO: 64.5 % (ref 42.7–76)
NITRITE UR QL STRIP: NEGATIVE
PH UR STRIP.AUTO: 7 [PH] (ref 4.5–8)
PLATELET # BLD AUTO: 341 10*3/MM3 (ref 140–450)
PMV BLD AUTO: 9.1 FL (ref 6–12)
POTASSIUM SERPL-SCNC: 3.4 MMOL/L (ref 3.5–5.2)
PROT SERPL-MCNC: 7.2 G/DL (ref 6–8.5)
PROT UR QL STRIP: NEGATIVE
QT INTERVAL: 369 MS
RBC # BLD AUTO: 5.44 10*6/MM3 (ref 4.14–5.8)
SODIUM SERPL-SCNC: 133 MMOL/L (ref 136–145)
SP GR UR STRIP: 1.01 (ref 1–1.03)
TROPONIN T SERPL-MCNC: <0.01 NG/ML (ref 0–0.03)
UROBILINOGEN UR QL STRIP: NORMAL
WBC # BLD AUTO: 10.65 10*3/MM3 (ref 3.4–10.8)

## 2021-08-18 PROCEDURE — 99283 EMERGENCY DEPT VISIT LOW MDM: CPT

## 2021-08-18 PROCEDURE — 93005 ELECTROCARDIOGRAM TRACING: CPT | Performed by: EMERGENCY MEDICINE

## 2021-08-18 PROCEDURE — 0 DIATRIZOATE MEGLUMINE & SODIUM PER 1 ML: Performed by: EMERGENCY MEDICINE

## 2021-08-18 PROCEDURE — 80053 COMPREHEN METABOLIC PANEL: CPT | Performed by: EMERGENCY MEDICINE

## 2021-08-18 PROCEDURE — 99284 EMERGENCY DEPT VISIT MOD MDM: CPT | Performed by: EMERGENCY MEDICINE

## 2021-08-18 PROCEDURE — 0 IOPAMIDOL PER 1 ML: Performed by: EMERGENCY MEDICINE

## 2021-08-18 PROCEDURE — 84484 ASSAY OF TROPONIN QUANT: CPT | Performed by: EMERGENCY MEDICINE

## 2021-08-18 PROCEDURE — 81003 URINALYSIS AUTO W/O SCOPE: CPT | Performed by: EMERGENCY MEDICINE

## 2021-08-18 PROCEDURE — 85025 COMPLETE CBC W/AUTO DIFF WBC: CPT | Performed by: EMERGENCY MEDICINE

## 2021-08-18 PROCEDURE — 93010 ELECTROCARDIOGRAM REPORT: CPT | Performed by: INTERNAL MEDICINE

## 2021-08-18 PROCEDURE — 74177 CT ABD & PELVIS W/CONTRAST: CPT

## 2021-08-18 RX ORDER — SODIUM CHLORIDE 0.9 % (FLUSH) 0.9 %
10 SYRINGE (ML) INJECTION AS NEEDED
Status: DISCONTINUED | OUTPATIENT
Start: 2021-08-18 | End: 2021-08-18 | Stop reason: HOSPADM

## 2021-08-18 RX ORDER — JNJ-78436735 50000000000 [PFU]/.5ML
0.5 SUSPENSION INTRAMUSCULAR ONCE
COMMUNITY

## 2021-08-18 RX ADMIN — DIATRIZOATE MEGLUMINE AND DIATRIZOATE SODIUM 30 ML: 600; 100 SOLUTION ORAL; RECTAL at 13:30

## 2021-08-18 RX ADMIN — IOPAMIDOL 100 ML: 755 INJECTION, SOLUTION INTRAVENOUS at 15:13

## 2021-08-18 NOTE — DISCHARGE INSTRUCTIONS
Continue current medications.  Recommend adding potassium containing foods to your diet regularly.  Follow-up with your PCP as above.  Follow-up with cardiology as above.  Return to ED for recurrent symptoms, medical emergencies.

## 2021-08-18 NOTE — ED PROVIDER NOTES
Subjective   Trenton Sears is a 65-year-old white male who presents secondary to not feeling well for the past 3 weeks.  Symptoms include fatigue, generalized weakness, headache, palpitations and 2 near syncopal episodes.  The near syncopal episodes occurred when patient standing from a sitting position.  Most recent one occurred this morning.  Patient states he sits at a bench at work and works with his hands.  When he stood up to walk around his work table he felt lightheaded and as though he might pass out.  Patient sat back down and symptoms quickly resolved.  Patient is vaccinated for COVID-19.  He had a Covid test this morning at work which was negative.  Patient also mentions left lower quadrant pain for the past few weeks.  History of diverticulitis.  Patient presents for evaluation.    Patient is vaccinated for COVID-19.    Patient reports he had an MI at 33 years of age.  Patient states a cardiac cath was performed after his MI.  It was unremarkable.  Doctors had no explanation as to what caused the patient's MI.  He has had a subsequent cardiac cath which was also unremarkable.      History provided by:  Patient      Review of Systems   Constitutional: Positive for fatigue. Negative for fever.   HENT: Negative for rhinorrhea.    Eyes: Negative for redness.   Respiratory: Negative for cough.    Cardiovascular: Negative for chest pain.   Gastrointestinal: Positive for abdominal pain.   Genitourinary: Negative for dysuria.   Musculoskeletal: Negative for back pain.   Skin: Negative for rash.   Neurological: Positive for weakness, light-headedness and headaches. Negative for syncope.        Near syncope   All other systems reviewed and are negative.      Past Medical History:   Diagnosis Date   • Colon polyp    • Elevated cholesterol    • GERD (gastroesophageal reflux disease)    • Hypertension    • CALLY on CPAP     AHI 53/h       No Known Allergies    Past Surgical History:   Procedure Laterality Date   • BACK  "SURGERY     • COLONOSCOPY     • ROTATOR CUFF REPAIR         Family History   Problem Relation Age of Onset   • Crohn's disease Sister    • Colon cancer Neg Hx    • Colon polyps Neg Hx        Social History     Socioeconomic History   • Marital status:      Spouse name: Not on file   • Number of children: Not on file   • Years of education: Not on file   • Highest education level: Not on file   Tobacco Use   • Smoking status: Current Every Day Smoker     Packs/day: 1.00     Years: 45.00     Pack years: 45.00     Types: Cigarettes   • Smokeless tobacco: Current User     Types: Chew   Substance and Sexual Activity   • Alcohol use: Not Currently     Comment: \"once in a blue moon\"   • Drug use: Not Currently   • Sexual activity: Defer           Objective   Physical Exam  Vitals and nursing note reviewed.   Constitutional:       General: He is not in acute distress.     Appearance: Normal appearance. He is well-developed. He is not ill-appearing, toxic-appearing or diaphoretic.      Comments: 65-year-old white male laying in bed.  Patient is a bit overweight.  He otherwise appears in good health.  Vital signs unremarkable.  Patient friendly and cooperative.   HENT:      Head: Normocephalic and atraumatic.      Right Ear: Tympanic membrane, ear canal and external ear normal.      Left Ear: Tympanic membrane, ear canal and external ear normal.      Nose: Nose normal.      Mouth/Throat:      Mouth: Mucous membranes are moist.      Pharynx: Oropharynx is clear.   Eyes:      Extraocular Movements: Extraocular movements intact.      Conjunctiva/sclera: Conjunctivae normal.      Pupils: Pupils are equal, round, and reactive to light.   Cardiovascular:      Rate and Rhythm: Normal rate and regular rhythm.      Heart sounds: Normal heart sounds. No murmur heard.   No friction rub. No gallop.    Pulmonary:      Effort: Pulmonary effort is normal. No respiratory distress.      Breath sounds: Normal breath sounds. No stridor. " No wheezing or rales.   Abdominal:      General: There is no distension.      Palpations: Abdomen is soft. There is no mass.      Tenderness: There is no abdominal tenderness. There is no guarding or rebound.      Hernia: No hernia is present.   Musculoskeletal:         General: Normal range of motion.      Cervical back: Normal range of motion and neck supple.   Skin:     General: Skin is warm and dry.      Findings: No erythema or rash.   Neurological:      General: No focal deficit present.      Mental Status: He is alert and oriented to person, place, and time.      Cranial Nerves: No cranial nerve deficit.      Deep Tendon Reflexes: Reflexes are normal and symmetric.   Psychiatric:         Mood and Affect: Mood normal.         Behavior: Behavior normal.         Procedures       EKG 12-lead  Date 8/18/2021  Time 12: 29  Normal sinus rhythm  Normal rate  Leftward axis  Probable left atrial enlargement  Interventricular conduction delay present  Q waves in 3, aVF and V1 suggestive of old inferior infarct  No evidence of acute infarct or ischemia  Nonspecific T wave changes  Abnormal EKG    Unchanged from EKG dated 1/19/2021    ED Course  ED Course as of Aug 18 1555   Wed Aug 18, 2021   1257 In addition to above-mentioned symptoms patient also reports left lower quadrant pain for several weeks.  History of diverticulitis.  Tender to palpation left lower quadrant on physical exam.  Obtaining CT of abdomen pelvis with IV and oral contrast.    [SS]   1350 Sodium(!): 133 [SS]   1350 Potassium(!): 3.4 [SS]   1350 Chloride(!): 95 [SS]   1350 CBC unremarkable.  CMP shows mild electrolyte abnormalities as above.  UA negative.  Troponin negative.    [SS]   1547 CT does not show any evidence of acute intra-abdominal disease process.  Unchanged from prior.  I find no specific etiology for patient's symptoms.  Monitor did occasionally show a sinus arrhythmia.  Will give cardiology for follow-up.    [SS]   1551 I have discussed  at length with patient (including family if appropriate) all results, diagnoses, treatment, indications to return to emergency room and follow-up.  Will d/c home.        [SS]      ED Course User Index  [SS] Baldomero Wray MD         Labs Reviewed   COMPREHENSIVE METABOLIC PANEL - Abnormal; Notable for the following components:       Result Value    Glucose 102 (*)     Sodium 133 (*)     Potassium 3.4 (*)     Chloride 95 (*)     All other components within normal limits    Narrative:     GFR Normal >60  Chronic Kidney Disease <60  Kidney Failure <15     TROPONIN (IN-HOUSE) - Normal    Narrative:     Troponin T Reference Range:  <= 0.03 ng/mL-   Negative for AMI  >0.03 ng/mL-     Abnormal for myocardial necrosis.  Clinicians would have to utilize clinical acumen, EKG, Troponin and serial changes to determine if it is an Acute Myocardial Infarction or myocardial injury due to an underlying chronic condition.       Results may be falsely decreased if patient taking Biotin.     URINALYSIS W/ MICROSCOPIC IF INDICATED (NO CULTURE) - Normal    Narrative:     Urine microscopic not indicated.   CBC WITH AUTO DIFFERENTIAL - Normal   CBC AND DIFFERENTIAL    Narrative:     The following orders were created for panel order CBC & Differential.  Procedure                               Abnormality         Status                     ---------                               -----------         ------                     CBC Auto Differential[[id number]]        Normal              Final result                 Please view results for these tests on the individual orders.     CT Abdomen Pelvis With Contrast    Result Date: 8/18/2021  Narrative: CT Abdomen Pelvis W INDICATION: 65-year-old man with prior history of diverticulitis complaining of left lower quadrant pain for 2 days. TECHNIQUE: CT of the abdomen and pelvis with Isovue-370-100 cc IV contrast. Coronal and sagittal reconstructions were obtained.  Radiation dose reduction  techniques included automated exposure control or exposure modulation based on body size. Count of known CT and cardiac nuc med studies performed in previous 12 months: 1. COMPARISON: CT abdomen and pelvis 6/25/2020 FINDINGS: Abdomen: The included images through the lung bases demonstrate minimal linear bibasilar densities likely atelectasis or scar. Stable benign calcified granuloma at the right lung base. There are small right infrahilar and left infrahilar lymph nodes which are unchanged. Stable hiatal hernia. The liver is low in attenuation relative to the spleen which may indicate underlying steatosis. No focal liver lesion. The spleen, pancreas, gallbladder, bile ducts and adrenal glands are normal Kidneys enhance normally with no mass, stone or obstruction. Cirrhotic changes of the abdominal aorta without aneurysm. The stomach is partially opacified and partially distended and appears normal. Small bowel is well-opacified and normal in appearance. Normal appendix The colon is unopacified. There is no colonic distention. There are colonic diverticula at the distal descending colon and sigmoid colon without wall thickening or acute inflammation. Pelvis: The bladder and seminal vesicles are normal. Stable mild enlargement of the prostate. Stable moderate sized fat-containing bilateral inguinal hernias. Stable small fat density periumbilical hernia. Stable lipoma in the right internal oblique muscle.     Impression: 1. Diverticulosis of the distal descending colon and sigmoid colon without CT evidence of diverticulitis. 2. Atherosclerotic changes of the abdominal aorta without aneurysm 3. Enlarged prostate without change 4. Fat density bilateral inguinal hernias and periumbilical hernia unchanged. Signer Name: Brandie Randall MD  Signed: 8/18/2021 3:37 PM  Workstation Name: [id number]  [facility]    My differential diagnosis includes but is not limited to generalized weakness, CVA,  TIA, Bell's palsy, acute MI, GI bleed, urinary tract infection, systemic infections including sepsis, alcohol abuse, drug abuse including prescription and street drug.    My differential diagnosis for palpitations includes but is not limited to sinus tachycardia, SVT, PACs, atrial fibrillation, atrial flutter, PVCs, V. fib, V. tach, ACS, toxins, drug abuse, electrolyte abnormalities and anxiety attacks.    My differential diagnosis for abdominal pain includes but is not limited to:  Gastritis, gastroenteritis, peptic ulcer disease, GERD, esophageal perforation, acute appendicitis, mesenteric adenitis, Meckel’s diverticulum, epiploic appendagitis, diverticulitis, colon cancer, ulcerative colitis, Crohn’s disease, intussusception, small bowel obstruction, adhesions, ischemic bowel, perforated viscus, ileus, obstipation, biliary colic, cholecystitis, cholelithiasis, Jonny-Avi Eamon, hepatitis, pancreatitis, common bile duct obstruction, cholangitis, bile leak, splenic trauma, splenic rupture, splenic infarction, splenic abscess, abdominal abscess, ascites, spontaneous bacterial peritonitis, hernia, UTI, cystitis, prostatitis, ureterolithiasis, urinary obstruction, AAA, myocardial infarction, pneumonia, cancer, porphyria, DKA, medications, sickle cell, viral syndrome, zoster                                       MDM    Final diagnoses:   Palpitations   Left lower quadrant abdominal pain   Hypokalemia   Generalized weakness       ED Disposition  ED Disposition     ED Disposition Condition Comment    Discharge Stable           Amaury Wynn MD  [address] [facility] [address] [phone]    Schedule an appointment as soon as possible for a visit in 1 week  for continued or worsened symptoms, Sooner if needed    Tiesha Tan MD  [facility] [address] [id number] [phone]    Schedule an appointment as soon as possible for a visit in 1 week  for continued or worsened  symptoms         Medication List      Changed    famotidine 40 MG tablet  Commonly known as: PEPCID  TAKE ONE TABLET BY MOUTH DAILY  What changed:   · how to take this  · when to take this             Baldomero Wray MD  08/18/21 4470

## 2021-11-07 DIAGNOSIS — K22.10 ULCERATIVE ESOPHAGITIS: ICD-10-CM

## 2021-11-08 RX ORDER — OMEPRAZOLE 40 MG/1
CAPSULE, DELAYED RELEASE ORAL
Qty: 30 CAPSULE | Refills: 2 | Status: SHIPPED | OUTPATIENT
Start: 2021-11-08 | End: 2022-02-07

## 2022-02-05 DIAGNOSIS — K22.10 ULCERATIVE ESOPHAGITIS: ICD-10-CM

## 2022-02-07 RX ORDER — OMEPRAZOLE 40 MG/1
CAPSULE, DELAYED RELEASE ORAL
Qty: 30 CAPSULE | Refills: 5 | Status: SHIPPED | OUTPATIENT
Start: 2022-02-07 | End: 2022-08-11

## 2022-05-16 RX ORDER — FAMOTIDINE 40 MG/1
TABLET, FILM COATED ORAL
Qty: 30 TABLET | Refills: 0 | Status: SHIPPED | OUTPATIENT
Start: 2022-05-16 | End: 2022-06-16

## 2022-06-16 ENCOUNTER — TELEPHONE (OUTPATIENT)
Dept: GASTROENTEROLOGY | Facility: CLINIC | Age: 67
End: 2022-06-16

## 2022-06-16 RX ORDER — FAMOTIDINE 40 MG/1
TABLET, FILM COATED ORAL
Qty: 30 TABLET | Refills: 0 | Status: SHIPPED | OUTPATIENT
Start: 2022-06-16 | End: 2022-07-18

## 2022-06-16 NOTE — TELEPHONE ENCOUNTER
----- Message from Eva Elias RN sent at 6/16/2022  9:24 AM EDT -----  Regarding: cancelled Colonoscopy  When I was approving his meds noticed he failed to call back and reschedule his colonoscopy.  May want to send out reminder letter.     Mert MUÑOZ has to be at the hospital (reschedule his colonoscopy at a hospital due to cancelling at [facility] due to laryngospasm after his EGD)

## 2022-06-27 ENCOUNTER — PREP FOR SURGERY (OUTPATIENT)
Dept: OTHER | Facility: HOSPITAL | Age: 67
End: 2022-06-27

## 2022-06-27 DIAGNOSIS — Z86.010 PERSONAL HISTORY OF COLONIC POLYPS: Primary | ICD-10-CM

## 2022-06-27 NOTE — TELEPHONE ENCOUNTER
Spoke with patient.  Scheduled at [place] 07/28/2022 arrive 12:30pm.  He corrected his copy of instructions.

## 2022-07-01 ENCOUNTER — HOSPITAL ENCOUNTER (OUTPATIENT)
Facility: HOSPITAL | Age: 67
Setting detail: HOSPITAL OUTPATIENT SURGERY
End: 2022-07-01
Attending: INTERNAL MEDICINE | Admitting: INTERNAL MEDICINE

## 2022-07-18 RX ORDER — FAMOTIDINE 40 MG/1
TABLET, FILM COATED ORAL
Qty: 30 TABLET | Refills: 0 | Status: SHIPPED | OUTPATIENT
Start: 2022-07-18 | End: 2022-08-05

## 2022-07-27 ENCOUNTER — ANESTHESIA EVENT (OUTPATIENT)
Dept: PERIOP | Facility: HOSPITAL | Age: 67
End: 2022-07-27

## 2022-07-27 VITALS — HEIGHT: 67 IN | BODY MASS INDEX: 36.1 KG/M2 | WEIGHT: 230 LBS

## 2022-07-27 RX ORDER — SODIUM CHLORIDE 9 MG/ML
40 INJECTION, SOLUTION INTRAVENOUS AS NEEDED
Status: CANCELLED | OUTPATIENT
Start: 2022-07-27

## 2022-07-27 RX ORDER — SODIUM CHLORIDE, SODIUM LACTATE, POTASSIUM CHLORIDE, CALCIUM CHLORIDE 600; 310; 30; 20 MG/100ML; MG/100ML; MG/100ML; MG/100ML
9 INJECTION, SOLUTION INTRAVENOUS CONTINUOUS
Status: CANCELLED | OUTPATIENT
Start: 2022-07-27

## 2022-07-27 RX ORDER — SODIUM CHLORIDE 0.9 % (FLUSH) 0.9 %
10 SYRINGE (ML) INJECTION AS NEEDED
Status: CANCELLED | OUTPATIENT
Start: 2022-07-27

## 2022-07-27 RX ORDER — MIDAZOLAM HYDROCHLORIDE 2 MG/2ML
0.5 INJECTION, SOLUTION INTRAMUSCULAR; INTRAVENOUS
Status: CANCELLED | OUTPATIENT
Start: 2022-07-27

## 2022-07-27 RX ORDER — SODIUM CHLORIDE 0.9 % (FLUSH) 0.9 %
10 SYRINGE (ML) INJECTION EVERY 12 HOURS SCHEDULED
Status: CANCELLED | OUTPATIENT
Start: 2022-07-27

## 2022-07-27 RX ORDER — MIDAZOLAM HYDROCHLORIDE 2 MG/2ML
1 INJECTION, SOLUTION INTRAMUSCULAR; INTRAVENOUS
Status: CANCELLED | OUTPATIENT
Start: 2022-07-27

## 2022-07-28 ENCOUNTER — TELEPHONE (OUTPATIENT)
Dept: GASTROENTEROLOGY | Facility: CLINIC | Age: 67
End: 2022-07-28

## 2022-07-28 ENCOUNTER — ANESTHESIA (OUTPATIENT)
Dept: PERIOP | Facility: HOSPITAL | Age: 67
End: 2022-07-28

## 2022-07-28 NOTE — TELEPHONE ENCOUNTER
Spoke with patient.  Scheduled for colonoscopy today at 1:30pm.  Did prep as instructed, still having brown solid stools.  What to do?    Advise his to drink other 8 oz water with 3 doses of miralax and call us back in hour.  Still having brown stools, will check with Dr. Shine.

## 2022-07-28 NOTE — TELEPHONE ENCOUNTER
"Spoke with patient.  \"I'm done.  Cancel my procedure today\"  Still have brown stools.  Offered to reschedule with 2 day prep.  \"I do not want to reschedule.\"    Cancelled as requeted.  "

## 2022-08-05 RX ORDER — FAMOTIDINE 40 MG/1
TABLET, FILM COATED ORAL
Qty: 30 TABLET | Refills: 0 | Status: SHIPPED | OUTPATIENT
Start: 2022-08-05 | End: 2022-10-03

## 2022-08-11 DIAGNOSIS — K22.10 ULCERATIVE ESOPHAGITIS: ICD-10-CM

## 2022-08-11 RX ORDER — OMEPRAZOLE 40 MG/1
CAPSULE, DELAYED RELEASE ORAL
Qty: 30 CAPSULE | Refills: 1 | Status: SHIPPED | OUTPATIENT
Start: 2022-08-11 | End: 2022-10-10

## 2022-10-03 RX ORDER — FAMOTIDINE 40 MG/1
TABLET, FILM COATED ORAL
Qty: 30 TABLET | Refills: 0 | Status: SHIPPED | OUTPATIENT
Start: 2022-10-03 | End: 2022-11-01

## 2022-10-08 DIAGNOSIS — K22.10 ULCERATIVE ESOPHAGITIS: ICD-10-CM

## 2022-10-10 RX ORDER — OMEPRAZOLE 40 MG/1
CAPSULE, DELAYED RELEASE ORAL
Qty: 30 CAPSULE | Refills: 0 | Status: SHIPPED | OUTPATIENT
Start: 2022-10-10 | End: 2022-11-07

## 2022-11-01 RX ORDER — FAMOTIDINE 40 MG/1
TABLET, FILM COATED ORAL
Qty: 30 TABLET | Refills: 0 | Status: SHIPPED | OUTPATIENT
Start: 2022-11-01 | End: 2022-12-14 | Stop reason: SDUPTHER

## 2022-11-06 DIAGNOSIS — K22.10 ULCERATIVE ESOPHAGITIS: ICD-10-CM

## 2022-11-07 RX ORDER — OMEPRAZOLE 40 MG/1
CAPSULE, DELAYED RELEASE ORAL
Qty: 30 CAPSULE | Refills: 0 | Status: SHIPPED | OUTPATIENT
Start: 2022-11-07 | End: 2022-12-14 | Stop reason: SDUPTHER

## 2022-11-28 ENCOUNTER — TRANSCRIBE ORDERS (OUTPATIENT)
Dept: ADMINISTRATIVE | Facility: HOSPITAL | Age: 67
End: 2022-11-28

## 2022-11-28 DIAGNOSIS — M54.41 ACUTE BILATERAL LOW BACK PAIN WITH BILATERAL SCIATICA: Primary | ICD-10-CM

## 2022-11-28 DIAGNOSIS — M54.42 ACUTE BILATERAL LOW BACK PAIN WITH BILATERAL SCIATICA: Primary | ICD-10-CM

## 2022-11-29 RX ORDER — FAMOTIDINE 40 MG/1
TABLET, FILM COATED ORAL
Qty: 30 TABLET | Refills: 0 | OUTPATIENT
Start: 2022-11-29

## 2022-12-04 DIAGNOSIS — K22.10 ULCERATIVE ESOPHAGITIS: ICD-10-CM

## 2022-12-05 ENCOUNTER — TELEPHONE (OUTPATIENT)
Dept: GASTROENTEROLOGY | Facility: CLINIC | Age: 67
End: 2022-12-05

## 2022-12-05 RX ORDER — OMEPRAZOLE 40 MG/1
CAPSULE, DELAYED RELEASE ORAL
Qty: 30 CAPSULE | Refills: 0 | OUTPATIENT
Start: 2022-12-05

## 2022-12-14 ENCOUNTER — OFFICE VISIT (OUTPATIENT)
Dept: GASTROENTEROLOGY | Facility: CLINIC | Age: 67
End: 2022-12-14

## 2022-12-14 VITALS
SYSTOLIC BLOOD PRESSURE: 134 MMHG | BODY MASS INDEX: 36.35 KG/M2 | WEIGHT: 231.6 LBS | HEIGHT: 67 IN | DIASTOLIC BLOOD PRESSURE: 84 MMHG

## 2022-12-14 DIAGNOSIS — K21.00 GASTROESOPHAGEAL REFLUX DISEASE WITH ESOPHAGITIS WITHOUT HEMORRHAGE: Primary | ICD-10-CM

## 2022-12-14 DIAGNOSIS — K22.10 ULCERATIVE ESOPHAGITIS: ICD-10-CM

## 2022-12-14 DIAGNOSIS — Z86.010 PERSONAL HISTORY OF COLONIC POLYPS: ICD-10-CM

## 2022-12-14 DIAGNOSIS — R11.0 NAUSEA: ICD-10-CM

## 2022-12-14 DIAGNOSIS — K57.90 DIVERTICULOSIS: ICD-10-CM

## 2022-12-14 PROCEDURE — 99214 OFFICE O/P EST MOD 30 MIN: CPT

## 2022-12-14 RX ORDER — CELECOXIB 200 MG/1
CAPSULE ORAL
COMMUNITY

## 2022-12-14 RX ORDER — ALBUTEROL SULFATE 90 UG/1
AEROSOL, METERED RESPIRATORY (INHALATION)
COMMUNITY
Start: 2022-11-28

## 2022-12-14 RX ORDER — TRAMADOL HYDROCHLORIDE 50 MG/1
TABLET ORAL
COMMUNITY
Start: 2022-11-28

## 2022-12-14 RX ORDER — DULOXETIN HYDROCHLORIDE 60 MG/1
CAPSULE, DELAYED RELEASE ORAL
COMMUNITY
Start: 2022-11-28 | End: 2023-02-14 | Stop reason: SDUPTHER

## 2022-12-14 RX ORDER — IBUPROFEN 800 MG/1
800 TABLET ORAL
COMMUNITY
End: 2022-12-14

## 2022-12-14 RX ORDER — ATORVASTATIN CALCIUM 20 MG/1
20 TABLET, FILM COATED ORAL
COMMUNITY
End: 2022-12-14

## 2022-12-14 RX ORDER — FAMOTIDINE 40 MG/1
40 TABLET, FILM COATED ORAL DAILY
Qty: 90 TABLET | Refills: 3 | Status: SHIPPED | OUTPATIENT
Start: 2022-12-14

## 2022-12-14 RX ORDER — FAMOTIDINE 40 MG/1
40 TABLET, FILM COATED ORAL DAILY
COMMUNITY
End: 2022-12-14

## 2022-12-14 RX ORDER — ONDANSETRON 4 MG/1
4 TABLET, FILM COATED ORAL EVERY 8 HOURS PRN
Qty: 25 TABLET | Refills: 0 | COMMUNITY

## 2022-12-14 RX ORDER — OMEPRAZOLE 40 MG/1
40 CAPSULE, DELAYED RELEASE ORAL DAILY
Qty: 90 CAPSULE | Refills: 3 | Status: SHIPPED | OUTPATIENT
Start: 2022-12-14 | End: 2022-12-14 | Stop reason: SDUPTHER

## 2022-12-14 RX ORDER — OMEPRAZOLE 40 MG/1
40 CAPSULE, DELAYED RELEASE ORAL 2 TIMES DAILY
Qty: 180 CAPSULE | Refills: 3 | Status: SHIPPED | OUTPATIENT
Start: 2022-12-14

## 2022-12-14 NOTE — PROGRESS NOTES
PATIENT INFORMATION  Trenton Sears       - 1955    CHIEF COMPLAINT  Chief Complaint   Patient presents with   • Heartburn       HISTORY OF PRESENT ILLNESS  Here today today for GERD follow-up    Managed on daily omeprazole and pepcid for several years. Has been out for a week or so and now some dyspepsia. No odynophagia, dysphagia, nausea, vomiting, abdominal pain, bloating, belching, reflux. Rare breakthrough symptoms. Daily ibuprofen use for chronic back pain. Does say he gets kana horses in stomach, onset with twisting can be either side, this morning happened with pulling on shoes. Stretching sometimes help. Lasted 5 min. Not worse since being out of medication.    Daily BM, usually easy and feels complete. No bleeding or rectal pain. Sometimes straining if not enough fiber in diet.    EGD 10/21/2019 with normal duodenum, reactive gastritis, ulcerative esophagitis, negative for Barretts or HP. Had laryngospasm after EGD, so planned colon was cancelled at that time. Cancelled colon scheduled 2022, due to inadequate prep, was vomiting with prep, was unaware of mag citrate replacement. Need colon performed at hospital.         REVIEWED PERTINENT RESULTS/ LABS  Lab Results   Component Value Date    CASEREPORT  10/21/2019     Surgical Pathology Report                         Case: [id number]                                  Authorizing Provider:  Roberto Shine        Collected:           10/21/2019 10:16 AM                                 MD [place]                                                                   Pathologist:           Antoinette Ferguson MD        Received:            10/21/2019 11:24 AM          Specimens:   1) - Stomach, Gastric bx                                                                            2) - Esophagus, Distal, Distal esophageal bx                                               FINALDX  10/21/2019     1. Stomach, Biopsy:   A. Gastric antral mucosa with mild  reactive gastropathy.    B. Negative for H. pylori-type organisms on routine stain.     2. Esophagus, Distal, Biopsy:   A. Squamocolumnar mucosa with basal layer hyperplasia, increased intraepithelial lymphocytes, and eosinophils        (focally up to 40/HPF), see Comment.   B. Negative for intestinal metaplasia.     jab/pkm        Lab Results   Component Value Date    HGB 15.2 08/18/2021    MCV 81.1 08/18/2021     08/18/2021    ALT 36 08/18/2021    AST 21 08/18/2021      No results found.    REVIEW OF SYSTEMS  Review of Systems   Constitutional: Negative.    HENT: Negative.    Eyes: Negative.    Respiratory: Negative.    Cardiovascular: Negative.    Gastrointestinal: Positive for abdominal distention and abdominal pain.        GERD   Endocrine: Negative.    Genitourinary: Negative.    Musculoskeletal: Positive for back pain.   Skin: Negative.    Allergic/Immunologic: Negative.    Neurological: Negative.    Hematological: Negative.    Psychiatric/Behavioral: Negative.          ACTIVE PROBLEMS  Patient Active Problem List    Diagnosis    • Class 1 obesity due to excess calories without serious comorbidity with body mass index (BMI) of 33.0 to 33.9 in adult [E66.09, Z68.33]    • Acute diverticulitis [K57.92]    • CALLY on CPAP [G47.33, Z99.89]    • Ulcerative esophagitis [K22.10]    • Personal history of colonic polyps [Z86.010]    • Gastroesophageal reflux disease [K21.9]    • Rectal bleeding [K62.5]          PAST MEDICAL HISTORY  Past Medical History:   Diagnosis Date   • Colon polyp    • Elevated cholesterol    • GERD (gastroesophageal reflux disease)    • Hypertension    • CALLY on CPAP     AHI 53/h         SURGICAL HISTORY  Past Surgical History:   Procedure Laterality Date   • BACK SURGERY     • COLONOSCOPY     • ROTATOR CUFF REPAIR           FAMILY HISTORY  Family History   Problem Relation Age of Onset   • Crohn's disease Sister    • Colon cancer Neg Hx    • Colon polyps Neg Hx          SOCIAL  "HISTORY  Social History     Occupational History   • Not on file   Tobacco Use   • Smoking status: Former     Years: 45.00     Types: Cigarettes     Quit date: 2022     Years since quittin.9   • Smokeless tobacco: Former     Types: Chew     Quit date: 2022   Vaping Use   • Vaping Use: Former   • Passive vaping exposure: Yes   Substance and Sexual Activity   • Alcohol use: Not Currently     Comment: \"once in a blue moon\"   • Drug use: Not Currently   • Sexual activity: Defer         CURRENT MEDICATIONS    Current Outpatient Medications:   •  amLODIPine (NORVASC) 10 MG tablet, Take 10 mg by mouth Daily., Disp: , Rfl:   •  celecoxib (CeleBREX) 200 MG capsule, celecoxib 200 mg capsule  Take 1 capsule every day by oral route for 30 days., Disp: , Rfl:   •  COVID-19 Ad26 Vaccine,Ever/Saurabh & Saurabh, (Ever/Saurabh & Saurabh COVID-19 Vaccine) 0.5 ML suspension, Inject 0.5 mL into the appropriate muscle as directed by prescriber 1 (One) Time., Disp: , Rfl:   •  DULoxetine (CYMBALTA) 60 MG capsule, , Disp: , Rfl:   •  famotidine (PEPCID) 40 MG tablet, Take 1 tablet by mouth Daily., Disp: 90 tablet, Rfl: 3  •  ibuprofen (ADVIL,MOTRIN) 800 MG tablet, Take 800 mg by mouth 2 (Two) Times a Day As Needed for Mild Pain  or Moderate Pain ., Disp: , Rfl:   •  lisinopril-hydrochlorothiazide (PRINZIDE,ZESTORETIC) 20-25 MG per tablet, Take 1 tablet by mouth Daily., Disp: , Rfl:   •  LORazepam (ATIVAN) 1 MG tablet, Take 1 mg by mouth 2 (Two) Times a Day As Needed for Anxiety., Disp: , Rfl:   •  Multiple Vitamins-Minerals (CENTRUM SILVER PO), Take 1 tablet by mouth Daily., Disp: , Rfl:   •  omeprazole (priLOSEC) 40 MG capsule, Take 1 capsule by mouth 2 (Two) Times a Day., Disp: 180 capsule, Rfl: 3  •  traMADol (ULTRAM) 50 MG tablet, , Disp: , Rfl:   •  Ventolin  (90 Base) MCG/ACT inhaler, , Disp: , Rfl:   •  ondansetron (Zofran) 4 MG tablet, Take 1 tablet by mouth Every 8 (Eight) Hours As Needed for Nausea or " "Vomiting., Disp: 25 tablet, Rfl: 0    ALLERGIES  Patient has no known allergies.    VITALS  Vitals:    12/14/22 1257   BP: 134/84   BP Location: Left arm   Patient Position: Sitting   Cuff Size: Adult   Weight: 105 kg (231 lb 9.6 oz)   Height: 170.2 cm (67.01\")       PHYSICAL EXAM  Debilities/Disabilities Identified: None  Emotional Behavior: Appropriate  Wt Readings from Last 3 Encounters:   12/14/22 105 kg (231 lb 9.6 oz)   08/18/21 94.3 kg (208 lb)   01/19/21 99.8 kg (220 lb)     Ht Readings from Last 1 Encounters:   12/14/22 170.2 cm (67.01\")     Body mass index is 36.27 kg/m².  Physical Exam  Constitutional:       General: He is not in acute distress.     Appearance: Normal appearance. He is not ill-appearing.   HENT:      Head: Normocephalic and atraumatic.      Mouth/Throat:      Mouth: Mucous membranes are moist.      Pharynx: No posterior oropharyngeal erythema.   Eyes:      General: No scleral icterus.  Cardiovascular:      Rate and Rhythm: Normal rate and regular rhythm.      Heart sounds: Normal heart sounds.   Pulmonary:      Effort: Pulmonary effort is normal.      Breath sounds: Normal breath sounds.   Abdominal:      General: Abdomen is flat. Bowel sounds are normal. There is no distension.      Palpations: Abdomen is soft. There is no mass.      Tenderness: There is abdominal tenderness in the epigastric area. There is no guarding or rebound. Negative signs include Lanier's sign.      Hernia: No hernia is present.      Comments: Markedly tender epigastric pain   Musculoskeletal:      Cervical back: Neck supple.   Skin:     General: Skin is warm.      Capillary Refill: Capillary refill takes less than 2 seconds.   Neurological:      General: No focal deficit present.      Mental Status: He is alert and oriented to person, place, and time.   Psychiatric:         Mood and Affect: Mood normal.         Behavior: Behavior normal.         Thought Content: Thought content normal.         Judgment: Judgment " normal.         CLINICAL DATA REVIEWED   reviewed previous lab results and integrated with today's visit, reviewed notes from other physicians and/or last GI encounter, reviewed previous endoscopy results and available photos, reviewed surgical pathology results from previous biopsies    ASSESSMENT  Diagnoses and all orders for this visit:    Gastroesophageal reflux disease with esophagitis without hemorrhage  -     famotidine (PEPCID) 40 MG tablet; Take 1 tablet by mouth Daily.  -     Discontinue: omeprazole (priLOSEC) 40 MG capsule; Take 1 capsule by mouth Daily.  -     omeprazole (priLOSEC) 40 MG capsule; Take 1 capsule by mouth 2 (Two) Times a Day.    Ulcerative esophagitis  -     Discontinue: omeprazole (priLOSEC) 40 MG capsule; Take 1 capsule by mouth Daily.  -     omeprazole (priLOSEC) 40 MG capsule; Take 1 capsule by mouth 2 (Two) Times a Day.    Personal history of colonic polyps  -     Case Request; Standing  -     Case Request    Nausea  -     ondansetron (Zofran) 4 MG tablet; Take 1 tablet by mouth Every 8 (Eight) Hours As Needed for Nausea or Vomiting.    Other orders  -     Ventolin  (90 Base) MCG/ACT inhaler  -     Discontinue: atorvastatin (LIPITOR) 20 MG tablet; Take 20 mg by mouth.  -     celecoxib (CeleBREX) 200 MG capsule; celecoxib 200 mg capsule   Take 1 capsule every day by oral route for 30 days.  -     DULoxetine (CYMBALTA) 60 MG capsule  -     Discontinue: famotidine (PEPCID) 40 MG tablet; Take 40 mg by mouth Daily.  -     Discontinue: ibuprofen (ADVIL,MOTRIN) 800 MG tablet; Take 800 mg by mouth.  -     traMADol (ULTRAM) 50 MG tablet  -     Follow Anesthesia Guidelines / Protocol; Future  -     Obtain informed consent; Standing  -     Verify bowel prep was successful; Standing  -     Give tap water enema if bowel prep was insufficient; Standing          PLAN    Add 10 g fiber, 64 oz water, daily exercise  Increase PPI to BID, AM and dinner  Fiber for bowel managemnt  Zofran for colon  prep day, 1.5 day    Return in about 2 months (around 2/14/2023).    I have discussed the above plan with the patient.  They verbalize understanding and are in agreement with the plan.  They have been advised to contact the office for any questions, concerns, or changes related to their health.

## 2023-01-06 ENCOUNTER — DOCUMENTATION (OUTPATIENT)
Dept: PAIN MEDICINE | Facility: HOSPITAL | Age: 68
End: 2023-01-06
Payer: MEDICARE

## 2023-01-06 NOTE — PROGRESS NOTES
I called patient and checked to see if he had any questions about his pain management appointment Monday at 0800. He confirmed that he is coming and voiced no questions.

## 2023-01-09 ENCOUNTER — ANESTHESIA (OUTPATIENT)
Dept: PAIN MEDICINE | Facility: HOSPITAL | Age: 68
End: 2023-01-09
Payer: MEDICARE

## 2023-01-09 ENCOUNTER — HOSPITAL ENCOUNTER (OUTPATIENT)
Dept: PAIN MEDICINE | Facility: HOSPITAL | Age: 68
Discharge: HOME | End: 2023-01-09
Payer: MEDICARE

## 2023-01-09 ENCOUNTER — ANESTHESIA EVENT (OUTPATIENT)
Dept: PAIN MEDICINE | Facility: HOSPITAL | Age: 68
End: 2023-01-09
Payer: MEDICARE

## 2023-01-09 ENCOUNTER — HOSPITAL ENCOUNTER (OUTPATIENT)
Dept: GENERAL RADIOLOGY | Facility: HOSPITAL | Age: 68
Discharge: HOME | End: 2023-01-09
Payer: MEDICARE

## 2023-01-09 VITALS
RESPIRATION RATE: 16 BRPM | HEART RATE: 88 BPM | DIASTOLIC BLOOD PRESSURE: 90 MMHG | OXYGEN SATURATION: 96 % | SYSTOLIC BLOOD PRESSURE: 153 MMHG | TEMPERATURE: 97.7 F

## 2023-01-09 DIAGNOSIS — M54.41 ACUTE BILATERAL LOW BACK PAIN WITH BILATERAL SCIATICA: Primary | ICD-10-CM

## 2023-01-09 DIAGNOSIS — M54.42 ACUTE BILATERAL LOW BACK PAIN WITH BILATERAL SCIATICA: ICD-10-CM

## 2023-01-09 DIAGNOSIS — M54.50 LUMBAR SPINE PAIN: ICD-10-CM

## 2023-01-09 DIAGNOSIS — M51.36 DDD (DEGENERATIVE DISC DISEASE), LUMBAR: ICD-10-CM

## 2023-01-09 DIAGNOSIS — M54.42 ACUTE BILATERAL LOW BACK PAIN WITH BILATERAL SCIATICA: Primary | ICD-10-CM

## 2023-01-09 DIAGNOSIS — M54.41 ACUTE BILATERAL LOW BACK PAIN WITH BILATERAL SCIATICA: ICD-10-CM

## 2023-01-09 PROCEDURE — 0 LIDOCAINE 1 % SOLUTION: Performed by: ANESTHESIOLOGY

## 2023-01-09 PROCEDURE — 0 IOPAMIDOL 41 % SOLUTION: Performed by: ANESTHESIOLOGY

## 2023-01-09 PROCEDURE — 25010000002 METHYLPREDNISOLONE PER 80 MG: Performed by: ANESTHESIOLOGY

## 2023-01-09 PROCEDURE — C1755 CATHETER, INTRASPINAL: HCPCS

## 2023-01-09 PROCEDURE — 77003 FLUOROGUIDE FOR SPINE INJECT: CPT

## 2023-01-09 RX ORDER — METHYLPREDNISOLONE ACETATE 80 MG/ML
80 INJECTION, SUSPENSION INTRA-ARTICULAR; INTRALESIONAL; INTRAMUSCULAR; SOFT TISSUE ONCE
Status: COMPLETED | OUTPATIENT
Start: 2023-01-09 | End: 2023-01-09

## 2023-01-09 RX ORDER — LIDOCAINE HYDROCHLORIDE 10 MG/ML
10 INJECTION, SOLUTION INFILTRATION; PERINEURAL ONCE
Status: COMPLETED | OUTPATIENT
Start: 2023-01-09 | End: 2023-01-09

## 2023-01-09 RX ADMIN — LIDOCAINE HYDROCHLORIDE 10 ML: 10 INJECTION, SOLUTION INFILTRATION; PERINEURAL at 08:07

## 2023-01-09 RX ADMIN — IOPAMIDOL 12 ML: 408 INJECTION, SOLUTION INTRATHECAL at 08:08

## 2023-01-09 RX ADMIN — METHYLPREDNISOLONE ACETATE 80 MG: 80 INJECTION, SUSPENSION INTRA-ARTICULAR; INTRALESIONAL; INTRAMUSCULAR; SOFT TISSUE at 08:09

## 2023-01-09 NOTE — H&P
SAMUEL Buchanan    History and Physical    Patient Name: Trenton Sears  :  1955  MRN:  [id number]  Date of Admission: 2023    Subjective     Patient is a 67 y.o. male presents with chief complaint of chronic, constant, moderate, severe low back and hips: right pain.  Onset of symptoms was abrupt starting 16 months ago.  Symptoms are associated/aggravated by lifting, standing for more than several minutes, twisting, walking for more than several minutes or getting out of bed in morning. Symptoms improve with lying down.  Mr. Sears presents to the anesthesia pain clinic with a 16-month history of low back pain with pain radiating into his right hip.  He reports that he had had pain intermittently for years, but in 2021 he was trying to empty a small pool and has had pain since that time.  The pain in his back is sharp and occasionally stabbing with lifting and twisting, and the pain into his right hip is an intense ache.  He occasionally has spasms.  His pain is worse with getting out of bed in the morning and with walking or standing for a few minutes.  Standing is not as bad as walking.  On occasion he feels like he will pass out due to the pain.  He reports his pain can get better when he reclines on the couch, especially when he is lying down, and it does help to lean back or stretch a little bit.  Pain is a 10 out of 10 at the worst and a 2 out of 10 at the least but is constant.  He reports his sleep is interrupted nightly due to his pain.  He has had tried Tylenol that seems to help but it usually helps when he is also taken ibuprofen.  Tramadol was of no help.  He has a prescription for hydrocodone but is only taken 1 and it did help his pain.  He also takes Celebrex that he does not notice a difference with and gabapentin was no help at all it just made him sleepy.  He has not tried ice or heat, he did try physical therapy that made his pain worse.  He did have an MRI that revealed disc  protrusion at L4-5 with severe degenerative facet hypertrophy at that level.  He also has severe right neuroforaminal narrowing and moderate lumbar spinal stenosis at that level.  He does have multilevel facet hypertrophy.  I discussed performing lumbar epidural steroid injection with the risks including, not limited to, bleeding and infection, postdural puncture headache, and the fact that I could not guarantee his pain would improve, in fact may worsen, or undergo no change, and he does wish to proceed at this time.  He is awake and alert at the time of the exam with normal mood and affect.  Debility/disabilities: None    The following portions of the patients history were reviewed and updated as appropriate: current medications, allergies, past medical history, past surgical history, past family history, past social history and problem list                Objective     Past Medical History:   Past Medical History:   Diagnosis Date   • Colon polyp    • Elevated cholesterol    • GERD (gastroesophageal reflux disease)    • Hypertension    • CALLY on CPAP     AHI 53/h     Past Surgical History:   Past Surgical History:   Procedure Laterality Date   • BACK SURGERY     • COLONOSCOPY     • ROTATOR CUFF REPAIR       Family History:   Family History   Problem Relation Age of Onset   • Crohn's disease Sister    • Colon cancer Neg Hx    • Colon polyps Neg Hx      Social History:   Social History     Socioeconomic History   • Marital status:    Tobacco Use   • Smoking status: Former     Years: 45.00     Types: Cigarettes     Quit date: 2022     Years since quittin.0   • Smokeless tobacco: Former     Types: Chew     Quit date: 2022   Vaping Use   • Vaping Use: Former   • Passive vaping exposure: Yes   Substance and Sexual Activity   • Alcohol use: Not Currently     Comment: \"once in a blue moon\"   • Drug use: Not Currently   • Sexual activity: Defer       Vital Signs Range for the last 24 hours  Temperature:  Temp:  [97.7 °F (36.5 °C)] 97.7 °F (36.5 °C)   Temp Source: Temp src: Oral   BP: BP: (151)/(90) 151/90   Pulse: Heart Rate:  [90] 90   Respirations: Resp:  [16] 16   SPO2: SpO2:  [96 %] 96 %   O2 Amount (l/min):     O2 Devices Device (Oxygen Therapy): room air   Weight:           --------------------------------------------------------------------------------    Current Outpatient Medications   Medication Sig Dispense Refill   • amLODIPine (NORVASC) 10 MG tablet Take 10 mg by mouth Daily.     • celecoxib (CeleBREX) 200 MG capsule celecoxib 200 mg capsule   Take 1 capsule every day by oral route for 30 days.     • COVID-19 Ad26 Vaccine,[employer], ([employer] COVID-19 Vaccine) 0.5 ML suspension Inject 0.5 mL into the appropriate muscle as directed by prescriber 1 (One) Time.     • DULoxetine (CYMBALTA) 60 MG capsule      • famotidine (PEPCID) 40 MG tablet Take 1 tablet by mouth Daily. 90 tablet 3   • ibuprofen (ADVIL,MOTRIN) 800 MG tablet Take 800 mg by mouth 2 (Two) Times a Day As Needed for Mild Pain  or Moderate Pain .     • lisinopril-hydrochlorothiazide (PRINZIDE,ZESTORETIC) 20-25 MG per tablet Take 1 tablet by mouth Daily.     • LORazepam (ATIVAN) 1 MG tablet Take 1 mg by mouth 2 (Two) Times a Day As Needed for Anxiety.     • Multiple Vitamins-Minerals (CENTRUM SILVER PO) Take 1 tablet by mouth Daily.     • omeprazole (priLOSEC) 40 MG capsule Take 1 capsule by mouth 2 (Two) Times a Day. 180 capsule 3   • ondansetron (ZOFRAN) 4 MG tablet Take 1 tablet by mouth Every 8 (Eight) Hours As Needed for Nausea or Vomiting. 25 tablet 0   • traMADol (ULTRAM) 50 MG tablet      • Ventolin  (90 Base) MCG/ACT inhaler        Current Facility-Administered Medications   Medication Dose Route Frequency Provider Last Rate Last Admin   • iopamidol (ISOVUE-M 200) injection 41%  12 mL Epidural Once PRN Michelle Escobar MD       • lidocaine (XYLOCAINE) 1 % injection 10 mL  10 mL Infiltration Once  Michelle Escobar MD       • methylPREDNISolone acetate (DEPO-medrol) injection 80 mg  80 mg Intra-articular Once Michelle Escobar MD           --------------------------------------------------------------------------------  Assessment & Plan      Anesthesia Evaluation     Patient summary reviewed and Nursing notes reviewed   no history of anesthetic complications:  NPO Solid Status: N/A  NPO Liquid Status: N/A    Pain impairs ability to perform ADLs: Ambulation, Working, Sleeping and Exercise/Activity  Modalities previously tried to control pain with limited effectiveness within the last 4-6 weeks: Rest, OTC medications, Prescription medications and Physical therapy     Airway   Mallampati: II  TM distance: >3 FB  Neck ROM: full  No difficulty expected  Dental - normal exam     Pulmonary     breath sounds clear to auscultation  (+) a smoker Current, sleep apnea (occ uses CPAP) on CPAP,   Cardiovascular - normal exam  Exercise tolerance: poor (<4 METS) (Limited due to back pain)    Rhythm: regular  Rate: normal    (+) hypertension well controlled 2 medications or greater, hyperlipidemia,       Neuro/Psych  GI/Hepatic/Renal/Endo    (+) obesity,  GERD well controlled,      Musculoskeletal     (+) back pain, chronic pain, radiculopathy Right lower extremity  Abdominal   (+) obese,    Substance History - negative use     OB/GYN negative ob/gyn ROS         Other   arthritis,                 Diagnosis and Plan    Treatment Plan  ASA 2      Procedures: Lumbar Epidural Steroid Injection(LESI), With fluoroscopy, Without ultrasound,      Anesthetic plan and risks discussed with spouse.          Diagnosis     * DDD (degenerative disc disease), lumbar [M51.36]     * Bulge of lumbar disc without myelopathy [M51.36]     * Lumbar spinal stenosis [M48.061]

## 2023-01-09 NOTE — ANESTHESIA PROCEDURE NOTES
PAIN Epidural block    Pre-sedation assessment completed: 1/9/2023 8:07 AM    Patient reassessed immediately prior to procedure    Patient location during procedure: pain clinic  Start Time: 1/9/2023 8:08 AM  Stop Time: 1/9/2023 8:10 AM  Indication:procedure for pain  Performed By  Anesthesiologist: Michelle Escobar MD  Preanesthetic Checklist  Completed: patient identified, site marked, risks and benefits discussed, surgical consent, monitors and equipment checked, pre-op evaluation and timeout performed  Prep:  Pt Position:prone  Sterile Tech:cap, gloves, sterile barrier and mask  Prep:chlorhexidine gluconate and isopropyl alcohol  Monitoring:continuous pulse oximetry and blood pressure monitoring  Procedure:Sedation: no     Approach:midline  Guidance: fluoroscopy  Location:lumbar  Level:4-5  Needle Type:Tuohy  Needle Gauge:22 G  Aspiration:negative  Medications:  Preservative Free Saline:5mL  Isovue:1mL  Comments:Skin infiltrated with 1% lidocaine.Depomedrol:80mg  Post Assessment:  Post-procedure: band aid applied.  Pt Tolerance:patient tolerated the procedure well with no apparent complications  Complications:no

## 2023-01-13 NOTE — NURSING NOTE
I called the patient to follow up with him after the lumbar steroid epidural. He said it was about 75 to 100 percent effective.  He scheduled another appointment.

## 2023-01-23 ENCOUNTER — ANESTHESIA EVENT (OUTPATIENT)
Dept: PAIN MEDICINE | Facility: HOSPITAL | Age: 68
End: 2023-01-23
Payer: MEDICARE

## 2023-02-06 ENCOUNTER — TRANSCRIBE ORDERS (OUTPATIENT)
Dept: CT IMAGING | Facility: HOSPITAL | Age: 68
End: 2023-02-06
Payer: COMMERCIAL

## 2023-02-06 DIAGNOSIS — F17.200 NICOTINE DEPENDENCE, UNCOMPLICATED, UNSPECIFIED NICOTINE PRODUCT TYPE: Primary | ICD-10-CM

## 2023-02-14 ENCOUNTER — LAB (OUTPATIENT)
Dept: LAB | Facility: HOSPITAL | Age: 68
End: 2023-02-14
Payer: MEDICARE

## 2023-02-14 ENCOUNTER — OFFICE VISIT (OUTPATIENT)
Dept: GASTROENTEROLOGY | Facility: CLINIC | Age: 68
End: 2023-02-14
Payer: MEDICARE

## 2023-02-14 VITALS
DIASTOLIC BLOOD PRESSURE: 82 MMHG | WEIGHT: 234.2 LBS | HEIGHT: 67 IN | SYSTOLIC BLOOD PRESSURE: 146 MMHG | BODY MASS INDEX: 36.76 KG/M2

## 2023-02-14 DIAGNOSIS — K21.00 GASTROESOPHAGEAL REFLUX DISEASE WITH ESOPHAGITIS WITHOUT HEMORRHAGE: ICD-10-CM

## 2023-02-14 DIAGNOSIS — Z86.010 PERSONAL HISTORY OF COLONIC POLYPS: ICD-10-CM

## 2023-02-14 DIAGNOSIS — R10.13 EPIGASTRIC PAIN: ICD-10-CM

## 2023-02-14 DIAGNOSIS — K22.10 ULCERATIVE ESOPHAGITIS: ICD-10-CM

## 2023-02-14 DIAGNOSIS — K22.10 ULCERATIVE ESOPHAGITIS: Primary | ICD-10-CM

## 2023-02-14 PROCEDURE — 99214 OFFICE O/P EST MOD 30 MIN: CPT

## 2023-02-14 PROCEDURE — 36415 COLL VENOUS BLD VENIPUNCTURE: CPT

## 2023-02-14 PROCEDURE — 86677 HELICOBACTER PYLORI ANTIBODY: CPT

## 2023-02-14 RX ORDER — CELECOXIB 200 MG/1
CAPSULE ORAL EVERY 24 HOURS
COMMUNITY
End: 2023-02-14 | Stop reason: SDUPTHER

## 2023-02-14 RX ORDER — SUCRALFATE 1 G/1
1 TABLET ORAL 4 TIMES DAILY
Qty: 120 TABLET | Refills: 3 | Status: SHIPPED | OUTPATIENT
Start: 2023-02-14

## 2023-02-14 RX ORDER — LEVOFLOXACIN 750 MG/1
TABLET ORAL EVERY 24 HOURS
COMMUNITY
End: 2023-02-14

## 2023-02-14 RX ORDER — ATORVASTATIN CALCIUM 20 MG/1
TABLET, FILM COATED ORAL
COMMUNITY
Start: 2023-01-15

## 2023-02-14 RX ORDER — OMEPRAZOLE 40 MG/1
CAPSULE, DELAYED RELEASE ORAL EVERY 12 HOURS SCHEDULED
COMMUNITY
End: 2023-02-14 | Stop reason: SDUPTHER

## 2023-02-14 RX ORDER — HYDROCODONE BITARTRATE AND ACETAMINOPHEN 7.5; 325 MG/1; MG/1
TABLET ORAL EVERY 6 HOURS
COMMUNITY

## 2023-02-14 RX ORDER — DULOXETIN HYDROCHLORIDE 60 MG/1
CAPSULE, DELAYED RELEASE ORAL
COMMUNITY

## 2023-02-14 RX ORDER — CEFDINIR 300 MG/1
CAPSULE ORAL
COMMUNITY
Start: 2023-01-05

## 2023-02-14 NOTE — PROGRESS NOTES
PATIENT INFORMATION  Trenton Sears       - 1955    CHIEF COMPLAINT  Chief Complaint   Patient presents with   • Heartburn       HISTORY OF PRESENT ILLNESS    Here today today for GERD follow-up     Managed on daily omeprazole and pepcid for several years. Has been out for a week or so and now some dyspepsia. No odynophagia, dysphagia, nausea, vomiting, abdominal pain, bloating, belching, reflux. Rare breakthrough symptoms. Daily ibuprofen use for chronic back pain. Does say he gets kana horses in stomach, onset with twisting can be either side, last time started with twisted. Lasted 5 min. Not worse since being out of medication. Increased PPI to BID LOV and no improvement today. Feels like its muscle, 3-4 times a week, stretches and can feel a knot, no immediate relief, been going on for years. Cramps probably close to a year. Never been treated for HP.     Daily BM, usually easy to pass and feels complete. No bleeding or rectal pain. Not straining or bleeding.     EGD 10/21/2019 with normal duodenum, reactive gastritis, ulcerative esophagitis, negative for Barretts or HP. Had laryngospasm after EGD, so planned colon was cancelled at that time. Cancelled colon scheduled 2022, due to inadequate prep, was vomiting with prep, was unaware of mag citrate replacement. Need colon performed at hospital.       REVIEWED PERTINENT RESULTS/ LABS  Lab Results   Component Value Date    CASEREPORT  10/21/2019     Surgical Pathology Report                         Case: [id number]                                  Authorizing Provider:  Roberto Shine        Collected:           10/21/2019 10:16 AM                                 MD [place]                                                                   Pathologist:           Antoinette Ferguson MD        Received:            10/21/2019 11:24 AM          Specimens:   1) - Stomach, Gastric bx                                                                             2) - Esophagus, Distal, Distal esophageal bx                                               FINALDX  10/21/2019     1. Stomach, Biopsy:   A. Gastric antral mucosa with mild reactive gastropathy.    B. Negative for H. pylori-type organisms on routine stain.     2. Esophagus, Distal, Biopsy:   A. Squamocolumnar mucosa with basal layer hyperplasia, increased intraepithelial lymphocytes, and eosinophils        (focally up to 40/HPF), see Comment.   B. Negative for intestinal metaplasia.     jab/pkm        Lab Results   Component Value Date    HGB 15.2 08/18/2021    MCV 81.1 08/18/2021     08/18/2021    ALT 36 08/18/2021    AST 21 08/18/2021      No results found.    REVIEW OF SYSTEMS  Review of Systems   Constitutional: Negative.    HENT: Positive for congestion.    Eyes: Negative.    Respiratory: Positive for shortness of breath.    Cardiovascular: Negative.    Gastrointestinal:        GERD   Endocrine: Negative.    Genitourinary: Negative.    Musculoskeletal: Positive for back pain (Lower).   Skin: Negative.    Allergic/Immunologic: Negative.    Neurological: Negative.    Hematological: Negative.    Psychiatric/Behavioral: Negative.          ACTIVE PROBLEMS  Patient Active Problem List    Diagnosis    • Class 1 obesity due to excess calories without serious comorbidity with body mass index (BMI) of 33.0 to 33.9 in adult [E66.09, Z68.33]    • Acute diverticulitis [K57.92]    • CALLY on CPAP [G47.33, Z99.89]    • Ulcerative esophagitis [K22.10]    • Personal history of colonic polyps [Z86.010]    • Gastroesophageal reflux disease [K21.9]    • Rectal bleeding [K62.5]          PAST MEDICAL HISTORY  Past Medical History:   Diagnosis Date   • Colon polyp    • Elevated cholesterol    • GERD (gastroesophageal reflux disease)    • Hypertension    • CALLY on CPAP     AHI 53/h         SURGICAL HISTORY  Past Surgical History:   Procedure Laterality Date   • BACK SURGERY     • COLONOSCOPY     • ROTATOR CUFF REPAIR    "        FAMILY HISTORY  Family History   Problem Relation Age of Onset   • Crohn's disease Sister    • Colon cancer Neg Hx    • Colon polyps Neg Hx          SOCIAL HISTORY  Social History     Occupational History   • Not on file   Tobacco Use   • Smoking status: Former     Years: 45.00     Types: Cigarettes     Quit date: 2022     Years since quittin.1   • Smokeless tobacco: Former     Types: Chew     Quit date: 2022   Vaping Use   • Vaping Use: Former   • Passive vaping exposure: Yes   Substance and Sexual Activity   • Alcohol use: Not Currently     Comment: \"once in a blue moon\"   • Drug use: Not Currently   • Sexual activity: Defer         CURRENT MEDICATIONS    Current Outpatient Medications:   •  amLODIPine (NORVASC) 10 MG tablet, Take 10 mg by mouth Daily., Disp: , Rfl:   •  atorvastatin (LIPITOR) 20 MG tablet, , Disp: , Rfl:   •  cefdinir (OMNICEF) 300 MG capsule, , Disp: , Rfl:   •  celecoxib (CeleBREX) 200 MG capsule, celecoxib 200 mg capsule  Take 1 capsule every day by oral route for 30 days., Disp: , Rfl:   •  COVID-19 Ad26 Vaccine,[employer], ([employer] COVID-19 Vaccine) 0.5 ML suspension, Inject 0.5 mL into the appropriate muscle as directed by prescriber 1 (One) Time., Disp: , Rfl:   •  DULoxetine (CYMBALTA) 60 MG capsule, duloxetine 60 mg capsule,delayed release, Disp: , Rfl:   •  famotidine (PEPCID) 40 MG tablet, Take 1 tablet by mouth Daily., Disp: 90 tablet, Rfl: 3  •  HYDROcodone-acetaminophen (NORCO) 7.5-325 MG per tablet, Every 6 (Six) Hours., Disp: , Rfl:   •  ibuprofen (ADVIL,MOTRIN) 800 MG tablet, Take 800 mg by mouth 2 (Two) Times a Day As Needed for Mild Pain  or Moderate Pain ., Disp: , Rfl:   •  lisinopril-hydrochlorothiazide (PRINZIDE,ZESTORETIC) 20-25 MG per tablet, Take 1 tablet by mouth Daily., Disp: , Rfl:   •  LORazepam (ATIVAN) 1 MG tablet, Take 1 mg by mouth 2 (Two) Times a Day As Needed for Anxiety., Disp: , Rfl:   •  Multiple " "Vitamins-Minerals (CENTRUM SILVER PO), Take 1 tablet by mouth Daily., Disp: , Rfl:   •  omeprazole (priLOSEC) 40 MG capsule, Take 1 capsule by mouth 2 (Two) Times a Day., Disp: 180 capsule, Rfl: 3  •  ondansetron (ZOFRAN) 4 MG tablet, Take 1 tablet by mouth Every 8 (Eight) Hours As Needed for Nausea or Vomiting., Disp: 25 tablet, Rfl: 0  •  traMADol (ULTRAM) 50 MG tablet, , Disp: , Rfl:   •  Ventolin  (90 Base) MCG/ACT inhaler, , Disp: , Rfl:   •  sucralfate (Carafate) 1 g tablet, Take 1 tablet by mouth 4 (Four) Times a Day., Disp: 120 tablet, Rfl: 3    ALLERGIES  Patient has no known allergies.    VITALS  Vitals:    02/14/23 1310   BP: 146/82   BP Location: Left arm   Patient Position: Sitting   Cuff Size: Adult   Weight: 106 kg (234 lb 3.2 oz)   Height: 170.2 cm (67.01\")       PHYSICAL EXAM  Debilities/Disabilities Identified: None  Emotional Behavior: Appropriate  Wt Readings from Last 3 Encounters:   02/14/23 106 kg (234 lb 3.2 oz)   12/14/22 105 kg (231 lb 9.6 oz)   08/18/21 94.3 kg (208 lb)     Ht Readings from Last 1 Encounters:   02/14/23 170.2 cm (67.01\")     Body mass index is 36.67 kg/m².  Physical Exam  Constitutional:       General: He is not in acute distress.     Appearance: Normal appearance. He is not ill-appearing.   HENT:      Head: Normocephalic and atraumatic.      Mouth/Throat:      Mouth: Mucous membranes are moist.      Pharynx: No posterior oropharyngeal erythema.   Eyes:      General: No scleral icterus.  Cardiovascular:      Rate and Rhythm: Normal rate and regular rhythm.      Heart sounds: Normal heart sounds.   Pulmonary:      Effort: Pulmonary effort is normal.      Breath sounds: Normal breath sounds.   Abdominal:      General: Abdomen is flat. Bowel sounds are normal. There is no distension.      Palpations: Abdomen is soft. There is no mass.      Tenderness: There is generalized abdominal tenderness and tenderness in the epigastric area. There is no guarding or rebound. " Negative signs include Lanier's sign.      Hernia: No hernia is present.      Comments: Epigastric worst   Musculoskeletal:      Cervical back: Neck supple.   Skin:     General: Skin is warm.      Capillary Refill: Capillary refill takes less than 2 seconds.   Neurological:      General: No focal deficit present.      Mental Status: He is alert and oriented to person, place, and time.   Psychiatric:         Mood and Affect: Mood normal.         Behavior: Behavior normal.         Thought Content: Thought content normal.         Judgment: Judgment normal.         CLINICAL DATA REVIEWED   reviewed previous lab results and integrated with today's visit, reviewed notes from other physicians and/or last GI encounter, reviewed previous endoscopy results and available photos, reviewed surgical pathology results from previous biopsies    ASSESSMENT  Diagnoses and all orders for this visit:    Ulcerative esophagitis  -     sucralfate (Carafate) 1 g tablet; Take 1 tablet by mouth 4 (Four) Times a Day.  -     Helicobacter Pylori, IgA IgG IgM; Future    Gastroesophageal reflux disease with esophagitis without hemorrhage  -     sucralfate (Carafate) 1 g tablet; Take 1 tablet by mouth 4 (Four) Times a Day.    Epigastric pain  -     Helicobacter Pylori, IgA IgG IgM; Future    Personal history of colonic polyps    Other orders  -     atorvastatin (LIPITOR) 20 MG tablet  -     cefdinir (OMNICEF) 300 MG capsule  -     Discontinue: celecoxib (CeleBREX) 200 MG capsule; Daily.  -     DULoxetine (CYMBALTA) 60 MG capsule; duloxetine 60 mg capsule,delayed release  -     HYDROcodone-acetaminophen (NORCO) 7.5-325 MG per tablet; Every 6 (Six) Hours.  -     Discontinue: levoFLOXacin (LEVAQUIN) 750 MG tablet; Daily.  -     Discontinue: omeprazole (priLOSEC) 40 MG capsule; Every 12 (Twelve) Hours.          PLAN    Carafate QID, call back in a few weeks if no changes and will trial levbid  Check HP, if no improvement, will add EGD to Colon  scheduled in June    No follow-ups on file.    I have discussed the above plan with the patient.  They verbalize understanding and are in agreement with the plan.  They have been advised to contact the office for any questions, concerns, or changes related to their health.

## 2023-02-15 LAB
H PYLORI IGA SER-ACNC: <9 UNITS (ref 0–8.9)
H PYLORI IGG SER IA-ACNC: 0.12 INDEX VALUE (ref 0–0.79)
H PYLORI IGM SER-ACNC: <9 UNITS (ref 0–8.9)

## 2023-02-21 ENCOUNTER — HOSPITAL ENCOUNTER (OUTPATIENT)
Dept: CT IMAGING | Facility: HOSPITAL | Age: 68
Discharge: HOME | End: 2023-02-21
Admitting: INTERNAL MEDICINE
Payer: MEDICARE

## 2023-02-21 DIAGNOSIS — F17.200 NICOTINE DEPENDENCE, UNCOMPLICATED, UNSPECIFIED NICOTINE PRODUCT TYPE: ICD-10-CM

## 2023-02-21 PROCEDURE — 71271 CT THORAX LUNG CANCER SCR C-: CPT

## 2023-02-23 ENCOUNTER — TRANSCRIBE ORDERS (OUTPATIENT)
Dept: CT IMAGING | Facility: HOSPITAL | Age: 68
End: 2023-02-23
Payer: COMMERCIAL

## 2023-02-23 DIAGNOSIS — R59.0 MEDIASTINAL LYMPHADENOPATHY: Primary | ICD-10-CM

## 2023-02-27 ENCOUNTER — ANESTHESIA (OUTPATIENT)
Dept: PAIN MEDICINE | Facility: HOSPITAL | Age: 68
End: 2023-02-27
Payer: MEDICARE

## 2023-02-27 ENCOUNTER — HOSPITAL ENCOUNTER (OUTPATIENT)
Dept: PAIN MEDICINE | Facility: HOSPITAL | Age: 68
Discharge: HOME | End: 2023-02-27
Payer: MEDICARE

## 2023-02-27 ENCOUNTER — HOSPITAL ENCOUNTER (OUTPATIENT)
Dept: GENERAL RADIOLOGY | Facility: HOSPITAL | Age: 68
Discharge: HOME | End: 2023-02-27
Payer: MEDICARE

## 2023-02-27 VITALS
RESPIRATION RATE: 16 BRPM | TEMPERATURE: 98.6 F | HEART RATE: 88 BPM | SYSTOLIC BLOOD PRESSURE: 161 MMHG | OXYGEN SATURATION: 94 % | DIASTOLIC BLOOD PRESSURE: 99 MMHG

## 2023-02-27 DIAGNOSIS — R52 PAIN: ICD-10-CM

## 2023-02-27 DIAGNOSIS — Z99.89 OSA ON CPAP: ICD-10-CM

## 2023-02-27 DIAGNOSIS — K22.10 ULCERATIVE ESOPHAGITIS: ICD-10-CM

## 2023-02-27 DIAGNOSIS — K57.92 ACUTE DIVERTICULITIS: ICD-10-CM

## 2023-02-27 DIAGNOSIS — M51.36 DDD (DEGENERATIVE DISC DISEASE), LUMBAR: Chronic | ICD-10-CM

## 2023-02-27 DIAGNOSIS — G47.33 OSA ON CPAP: ICD-10-CM

## 2023-02-27 DIAGNOSIS — Z86.010 PERSONAL HISTORY OF COLONIC POLYPS: ICD-10-CM

## 2023-02-27 DIAGNOSIS — E66.09 CLASS 1 OBESITY DUE TO EXCESS CALORIES WITHOUT SERIOUS COMORBIDITY WITH BODY MASS INDEX (BMI) OF 33.0 TO 33.9 IN ADULT: Primary | ICD-10-CM

## 2023-02-27 DIAGNOSIS — K62.5 RECTAL BLEEDING: ICD-10-CM

## 2023-02-27 PROBLEM — M51.369 DDD (DEGENERATIVE DISC DISEASE), LUMBAR: Chronic | Status: ACTIVE | Noted: 2023-02-27

## 2023-02-27 PROCEDURE — 25510000001 IOPAMIDOL 41 % SOLUTION: Performed by: ANESTHESIOLOGY

## 2023-02-27 PROCEDURE — 0 LIDOCAINE 1 % SOLUTION: Performed by: ANESTHESIOLOGY

## 2023-02-27 PROCEDURE — C1755 CATHETER, INTRASPINAL: HCPCS

## 2023-02-27 PROCEDURE — 77003 FLUOROGUIDE FOR SPINE INJECT: CPT

## 2023-02-27 PROCEDURE — 25010000002 METHYLPREDNISOLONE PER 80 MG: Performed by: ANESTHESIOLOGY

## 2023-02-27 RX ORDER — LIDOCAINE HYDROCHLORIDE 10 MG/ML
10 INJECTION, SOLUTION INFILTRATION; PERINEURAL ONCE
Status: COMPLETED | OUTPATIENT
Start: 2023-02-27 | End: 2023-02-27

## 2023-02-27 RX ORDER — METHYLPREDNISOLONE ACETATE 80 MG/ML
80 INJECTION, SUSPENSION INTRA-ARTICULAR; INTRALESIONAL; INTRAMUSCULAR; SOFT TISSUE ONCE
Status: COMPLETED | OUTPATIENT
Start: 2023-02-27 | End: 2023-02-27

## 2023-02-27 RX ADMIN — LIDOCAINE HYDROCHLORIDE 10 ML: 10 INJECTION, SOLUTION INFILTRATION; PERINEURAL at 11:16

## 2023-02-27 RX ADMIN — IOPAMIDOL 12 ML: 408 INJECTION, SOLUTION INTRATHECAL at 11:17

## 2023-02-27 RX ADMIN — METHYLPREDNISOLONE ACETATE 80 MG: 80 INJECTION, SUSPENSION INTRA-ARTICULAR; INTRALESIONAL; INTRAMUSCULAR; SOFT TISSUE at 11:18

## 2023-02-27 NOTE — ANESTHESIA PROCEDURE NOTES
PAIN Epidural block    Pre-sedation assessment completed: 2/27/2023 11:13 AM    Patient reassessed immediately prior to procedure    Patient location during procedure: pain clinic  Start Time: 2/27/2023 11:16 AM  Stop Time: 2/27/2023 11:19 AM  Indication:procedure for pain  Performed By  Anesthesiologist: Michelle Escobar MD  Preanesthetic Checklist  Completed: patient identified, site marked, risks and benefits discussed, surgical consent, monitors and equipment checked, pre-op evaluation and timeout performed  Prep:  Pt Position:prone  Sterile Tech:cap, gloves, mask and sterile barrier  Prep:chlorhexidine gluconate and isopropyl alcohol  Monitoring:blood pressure monitoring and continuous pulse oximetry  Procedure:Sedation: no     Approach:midline  Guidance: fluoroscopy  Location:lumbar  Level:3-4  Needle Type:Tuohy  Needle Gauge:22 G  Aspiration:negative  Medications:  Preservative Free Saline:6mL  Isovue:1mL  Comments:Skin infiltration with 1% lidocaine using 27 G needle.Depomedrol:80  Post Assessment:  Post-procedure: band aid applied.  Pt Tolerance:patient tolerated the procedure well with no apparent complications  Complications:no

## 2023-02-27 NOTE — ADDENDUM NOTE
Addendum  created 02/27/23 1124 by Michelle Escobar MD    Order Reconciliation Section accessed, Order list changed, Pharmacy for encounter modified

## 2023-02-27 NOTE — H&P
SAMUEL Buchanan    History and Physical    Patient Name: Trenton Sears  :  1955  MRN:  [id number]  Date of Admission: 2023    Subjective     Patient is a 67 y.o. male presents with chief complaint of chronic, constant, severe low back and hips: right pain.  Onset of symptoms was gradual starting several years ago.  Symptoms are associated/aggravated by lifting, standing for more than several minutes, walking for more than several minutes or transitioning out of bed in am. Symptoms improve with relaxation and heating pad .  Mr. Sears underwent lumbar epidural steroid injection on 2023 with 90 to 100% improvement of his pain for several weeks, and then his pain gradually returned, and his pain is back to its original level in the last week or so.  He complains of low back pain and pain radiating into his right hip that is worse with lifting, standing or walking for more than 5 to 10 minutes, especially with transitioning out of bed in the morning.  The back is a sharp stabbing pain in the right hip pain is an intense ache.  He reports that his sleep is not interrupted by his pain but he sleeps poorly no matter what.  His pain does improve with reclining and with use of heat.  He uses Celebrex, Norco, ibuprofen, and I did warn him not to take too much of the nonsteroidal anti-inflammatories.  He had an MRI performed at [facility] that revealed right lateral disc protrusion L2-3 with moderate foraminal and central narrowing, small disc bulge to right L3-4 with mild stenosis, bulging disc eccentric to right L4-5 with mod stenosis.  I discussed performing lumbar epidural steroid injection with the risks including, not limited to, bleeding and infection, postdural puncture headache, and the fact that I could not guarantee his pain would improve, in fact may worsen, or undergo no change, and he does wish to proceed at this time.  He is awake and alert at the time of the exam with normal mood  "and affect.  Debility/disabilities: None    The following portions of the patients history were reviewed and updated as appropriate: current medications, allergies, past medical history, past surgical history, past family history, past social history and problem list                Objective     Past Medical History:   Past Medical History:   Diagnosis Date   • Colon polyp    • DDD (degenerative disc disease), lumbar 2023   • Elevated cholesterol    • GERD (gastroesophageal reflux disease)    • Hypertension    • CALLY on CPAP     AHI 53/h     Past Surgical History:   Past Surgical History:   Procedure Laterality Date   • BACK SURGERY     • COLONOSCOPY     • ROTATOR CUFF REPAIR       Family History:   Family History   Problem Relation Age of Onset   • Crohn's disease Sister    • Colon cancer Neg Hx    • Colon polyps Neg Hx      Social History:   Social History     Socioeconomic History   • Marital status:    Tobacco Use   • Smoking status: Former     Years: 45.00     Types: Cigarettes     Quit date: 2022     Years since quittin.1   • Smokeless tobacco: Former     Types: Chew     Quit date: 2022   Vaping Use   • Vaping Use: Former   • Passive vaping exposure: Yes   Substance and Sexual Activity   • Alcohol use: Not Currently     Comment: \"once in a blue moon\"   • Drug use: Not Currently   • Sexual activity: Defer       Vital Signs Range for the last 24 hours  Temperature: Temp:  [98.6 °F (37 °C)] 98.6 °F (37 °C)   Temp Source: Temp src: Oral   BP: BP: (163)/(92) 163/92   Pulse: Heart Rate:  [93] 93   Respirations: Resp:  [16] 16   SPO2: SpO2:  [93 %] 93 %   O2 Amount (l/min):     O2 Devices Device (Oxygen Therapy): room air   Weight:           --------------------------------------------------------------------------------    Current Outpatient Medications   Medication Sig Dispense Refill   • amLODIPine (NORVASC) 10 MG tablet Take 10 mg by mouth Daily.     • atorvastatin (LIPITOR) 20 MG tablet    "   • cefdinir (OMNICEF) 300 MG capsule      • celecoxib (CeleBREX) 200 MG capsule celecoxib 200 mg capsule   Take 1 capsule every day by oral route for 30 days.     • COVID-19 Ad26 Vaccine,Ever/Saurabh & Saurabh, (Ever/Saurabh & Saurabh COVID-19 Vaccine) 0.5 ML suspension Inject 0.5 mL into the appropriate muscle as directed by prescriber 1 (One) Time.     • DULoxetine (CYMBALTA) 60 MG capsule duloxetine 60 mg capsule,delayed release     • famotidine (PEPCID) 40 MG tablet Take 1 tablet by mouth Daily. 90 tablet 3   • HYDROcodone-acetaminophen (NORCO) 7.5-325 MG per tablet Every 6 (Six) Hours.     • ibuprofen (ADVIL,MOTRIN) 800 MG tablet Take 800 mg by mouth 2 (Two) Times a Day As Needed for Mild Pain  or Moderate Pain .     • lisinopril-hydrochlorothiazide (PRINZIDE,ZESTORETIC) 20-25 MG per tablet Take 1 tablet by mouth Daily.     • LORazepam (ATIVAN) 1 MG tablet Take 1 mg by mouth 2 (Two) Times a Day As Needed for Anxiety.     • omeprazole (priLOSEC) 40 MG capsule Take 1 capsule by mouth 2 (Two) Times a Day. 180 capsule 3   • ondansetron (ZOFRAN) 4 MG tablet Take 1 tablet by mouth Every 8 (Eight) Hours As Needed for Nausea or Vomiting. 25 tablet 0   • sucralfate (Carafate) 1 g tablet Take 1 tablet by mouth 4 (Four) Times a Day. 120 tablet 3   • traMADol (ULTRAM) 50 MG tablet      • Ventolin  (90 Base) MCG/ACT inhaler      • Multiple Vitamins-Minerals (CENTRUM SILVER PO) Take 1 tablet by mouth Daily.       Current Facility-Administered Medications   Medication Dose Route Frequency Provider Last Rate Last Admin   • iopamidol (ISOVUE-M 200) injection 41%  12 mL Epidural Once PRN Michelle Escobar MD       • lidocaine (XYLOCAINE) 1 % injection 10 mL  10 mL Infiltration Once Michelle Escobar MD       • methylPREDNISolone acetate (DEPO-medrol) injection 80 mg  80 mg Intra-articular Once Michelle Escobar MD           --------------------------------------------------------------------------------  Assessment & Plan       Anesthesia Evaluation     Patient summary reviewed and Nursing notes reviewed   history of anesthetic complications:  NPO Solid Status: N/A  NPO Liquid Status: N/A    Pain impairs ability to perform ADLs: Ambulation, Exercise/Activity and Working  Modalities previously tried to control pain with limited effectiveness within the last 4-6 weeks: Rest, Heat, OTC medications and Prescription medications     Airway   Mallampati: II  TM distance: >3 FB  Neck ROM: full  No difficulty expected  Dental - normal exam     Pulmonary - normal exam    breath sounds clear to auscultation  (+) sleep apnea on CPAP,   Cardiovascular - normal exam    Rhythm: regular  Rate: normal    (+) hypertension well controlled 2 medications or greater, hyperlipidemia,       Neuro/Psych  GI/Hepatic/Renal/Endo    (+)  GERD well controlled,      Musculoskeletal     (+) back pain, chronic pain, radiculopathy Right lower extremity  Abdominal  - normal exam   Substance History - negative use     OB/GYN          Other                   Diagnosis and Plan    Treatment Plan  ASA 2   Patient has had previous injection/procedure with % improvement.   Procedures: Lumbar Epidural Steroid Injection(LESI), With fluoroscopy, Without ultrasound,      Anesthetic plan and risks discussed with patient.          Diagnosis     * DDD (degenerative disc disease), lumbar [M51.36]     * Lumbar disc herniation [M51.26]     * Spinal stenosis [M48.00]

## 2023-03-09 ENCOUNTER — HOSPITAL ENCOUNTER (OUTPATIENT)
Dept: CT IMAGING | Facility: HOSPITAL | Age: 68
Discharge: HOME | End: 2023-03-09
Admitting: INTERNAL MEDICINE
Payer: MEDICARE

## 2023-03-09 DIAGNOSIS — R59.0 MEDIASTINAL LYMPHADENOPATHY: ICD-10-CM

## 2023-03-09 LAB — CREAT BLDA-MCNC: 0.8 MG/DL (ref 0.6–1.3)

## 2023-03-09 PROCEDURE — 71260 CT THORAX DX C+: CPT

## 2023-03-09 PROCEDURE — 25510000001 IOPAMIDOL 61 % SOLUTION: Performed by: INTERNAL MEDICINE

## 2023-03-09 PROCEDURE — 82565 ASSAY OF CREATININE: CPT

## 2023-03-09 RX ADMIN — IOPAMIDOL 100 ML: 612 INJECTION, SOLUTION INTRAVENOUS at 11:13

## 2023-03-14 ENCOUNTER — TRANSCRIBE ORDERS (OUTPATIENT)
Dept: PET IMAGING | Facility: HOSPITAL | Age: 68
End: 2023-03-14
Payer: COMMERCIAL

## 2023-03-14 DIAGNOSIS — R59.0 LOCALIZED ENLARGED LYMPH NODES: Primary | ICD-10-CM

## 2023-03-22 ENCOUNTER — TRANSCRIBE ORDERS (OUTPATIENT)
Dept: ADMINISTRATIVE | Facility: HOSPITAL | Age: 68
End: 2023-03-22
Payer: COMMERCIAL

## 2023-03-22 DIAGNOSIS — R91.8 LUNG MASS: Primary | ICD-10-CM

## 2023-03-23 ENCOUNTER — HOSPITAL ENCOUNTER (OUTPATIENT)
Dept: PET IMAGING | Facility: HOSPITAL | Age: 68
Discharge: HOME | End: 2023-03-23
Payer: MEDICARE

## 2023-03-23 DIAGNOSIS — R91.8 LUNG MASS: ICD-10-CM

## 2023-03-23 LAB — GLUCOSE BLDC GLUCOMTR-MCNC: 101 MG/DL (ref 70–130)

## 2023-03-23 PROCEDURE — 0 FLUDEOXYGLUCOSE F18 SOLUTION: Performed by: INTERNAL MEDICINE

## 2023-03-23 PROCEDURE — 78815 PET IMAGE W/CT SKULL-THIGH: CPT

## 2023-03-23 PROCEDURE — A9552 F18 FDG: HCPCS | Performed by: INTERNAL MEDICINE

## 2023-03-23 PROCEDURE — 82962 GLUCOSE BLOOD TEST: CPT

## 2023-03-23 RX ADMIN — FLUDEOXYGLUCOSE F18 1 DOSE: 300 INJECTION INTRAVENOUS at 12:55

## 2023-04-04 ENCOUNTER — PREP FOR SURGERY (OUTPATIENT)
Dept: OTHER | Facility: HOSPITAL | Age: 68
End: 2023-04-04
Payer: COMMERCIAL

## 2023-04-04 ENCOUNTER — OFFICE VISIT (OUTPATIENT)
Dept: OTHER | Facility: HOSPITAL | Age: 68
End: 2023-04-04
Payer: MEDICARE

## 2023-04-04 VITALS
HEART RATE: 95 BPM | WEIGHT: 228.8 LBS | DIASTOLIC BLOOD PRESSURE: 89 MMHG | TEMPERATURE: 98.8 F | BODY MASS INDEX: 35.83 KG/M2 | OXYGEN SATURATION: 94 % | RESPIRATION RATE: 20 BRPM | SYSTOLIC BLOOD PRESSURE: 137 MMHG

## 2023-04-04 DIAGNOSIS — R91.1 SOLITARY PULMONARY NODULE: Primary | ICD-10-CM

## 2023-04-04 DIAGNOSIS — R59.0 MEDIASTINAL LYMPHADENOPATHY: Primary | ICD-10-CM

## 2023-04-04 DIAGNOSIS — R91.8 LUNG MASS: Primary | ICD-10-CM

## 2023-04-04 PROCEDURE — G0463 HOSPITAL OUTPT CLINIC VISIT: HCPCS | Performed by: STUDENT IN AN ORGANIZED HEALTH CARE EDUCATION/TRAINING PROGRAM

## 2023-04-04 PROCEDURE — 99205 OFFICE O/P NEW HI 60 MIN: CPT | Performed by: STUDENT IN AN ORGANIZED HEALTH CARE EDUCATION/TRAINING PROGRAM

## 2023-04-04 RX ORDER — GABAPENTIN 300 MG/1
300 CAPSULE ORAL ONCE
Status: CANCELLED | OUTPATIENT
Start: 2023-04-14 | End: 2023-04-04

## 2023-04-04 RX ORDER — ACETAMINOPHEN 500 MG
1000 TABLET ORAL ONCE
Status: CANCELLED | OUTPATIENT
Start: 2023-04-14 | End: 2023-04-04

## 2023-04-04 RX ORDER — CELECOXIB 200 MG/1
200 CAPSULE ORAL ONCE
Status: CANCELLED | OUTPATIENT
Start: 2023-04-14 | End: 2023-04-04

## 2023-04-04 NOTE — PROGRESS NOTES
04/17/23 0001   Pre-Procedure Phone Call   Procedure Time Verified Yes   Arrival Time 0630   Procedure Location Verified Yes   Medical History Reviewed No   NPO Status Reinforced Yes   Ride and Caregiver Arranged Yes   Patient Knows to Bring Current Medications   (No changes in medications since last reviewed.)   Bring Outside Films Requested   (CT Chest 3/9/23 in PACS, NM PET 3/23/23 in PACS)

## 2023-04-04 NOTE — H&P (VIEW-ONLY)
"Chief Complaint  Mediastinal lymphadenopathy.  Right lower lobe new pulmonary nodule that is hypermetabolic on PET scan  Subjective        Trenton Sears presents to [facility]  History of Present Illness  Mr. Sears is a pleasant 67-year-old gentleman who presents to thoracic surgery multidisciplinary clinic for evaluation of a newly found right lower lobe pulmonary nodule and mediastinal lymphadenopathy.  He is a current smoker and he has smoked approximately 45 years.  At his peak he smoked approximately 2 packs/day but now he is roughly a pack per day.  He stated this all started a few months ago when he started to have a cough that was treated with several rounds of antibiotics, this prompted a CT scan which showed bulky mediastinal lymphadenopathy and a few pulmonary nodules, subsequently a CT/PET scan was ordered and he presents today for evaluation of these metabolically active lesions.  His cough has resolved, he does state that he complains of some mild lower back pain which he does have a chronic compressed disc and some herniated disc in his lower back and he has overall general malaise.  He denies any weight loss, any fevers or chills.  He denies any hemoptysis or hematemesis.    Objective   Vital Signs:  /89   Pulse 95   Temp 98.8 °F (37.1 °C)   Resp 20   Wt 104 kg (228 lb 12.8 oz)   SpO2 94%   BMI 35.83 kg/m²   Estimated body mass index is 35.83 kg/m² as calculated from the following:    Height as of 2/14/23: 170.2 cm (67.01\").    Weight as of this encounter: 104 kg (228 lb 12.8 oz).           Physical Exam  Constitutional:       Appearance: Normal appearance.   HENT:      Head: Normocephalic.      Nose: Nose normal.   Cardiovascular:      Rate and Rhythm: Normal rate and regular rhythm.      Pulses: Normal pulses.      Heart sounds: Normal heart sounds.   Pulmonary:      Effort: Pulmonary effort is normal.      Breath sounds: Normal breath sounds. "   Musculoskeletal:         General: Normal range of motion.      Cervical back: Normal range of motion.   Skin:     General: Skin is warm.      Capillary Refill: Capillary refill takes less than 2 seconds.   Neurological:      General: No focal deficit present.      Mental Status: He is alert and oriented to person, place, and time.   Psychiatric:         Mood and Affect: Mood normal.         Behavior: Behavior normal.         Thought Content: Thought content normal.         Judgment: Judgment normal.        Result Review :  CT/PET scan was visualized and reviewed by myself, I agree with the interpretation.             Assessment and Plan   Diagnoses and all orders for this visit:    1. Lung mass (Primary)     is a pleasant 67-year-old gentleman who presents today for evaluation of his PET active mediastinal lymph nodes and pulmonary nodules.  In regards to this new right lower lobe pulmonary nodule, I will ask our colleagues at interventional radiology if they think this is amenable to CT-guided biopsy.  We will order that today.  In regards to the mediastinal lymphadenopathy that is PET positive, I will schedule him for a cervical mediastinoscopy to obtain tissue sampling and evaluate these mediastinal lymph nodes.       I spent 60 minutes caring for Trenton on this date of service. This time includes time spent by me in the following activities:preparing for the visit, reviewing tests, obtaining and/or reviewing a separately obtained history, performing a medically appropriate examination and/or evaluation , counseling and educating the patient/family/caregiver, ordering medications, tests, or procedures, referring and communicating with other health care professionals , documenting information in the medical record, independently interpreting results and communicating that information with the patient/family/caregiver and care coordination  Follow Up   No follow-ups on file.  Patient was given  instructions and counseling regarding his condition or for health maintenance advice. Please see specific information pulled into the AVS if appropriate.

## 2023-04-04 NOTE — H&P (VIEW-ONLY)
"Chief Complaint  Mediastinal lymphadenopathy.  Right lower lobe new pulmonary nodule that is hypermetabolic on PET scan  Subjective        Trenton Sears presents to [facility]  History of Present Illness  Mr. Sears is a pleasant 67-year-old gentleman who presents to thoracic surgery multidisciplinary clinic for evaluation of a newly found right lower lobe pulmonary nodule and mediastinal lymphadenopathy.  He is a current smoker and he has smoked approximately 45 years.  At his peak he smoked approximately 2 packs/day but now he is roughly a pack per day.  He stated this all started a few months ago when he started to have a cough that was treated with several rounds of antibiotics, this prompted a CT scan which showed bulky mediastinal lymphadenopathy and a few pulmonary nodules, subsequently a CT/PET scan was ordered and he presents today for evaluation of these metabolically active lesions.  His cough has resolved, he does state that he complains of some mild lower back pain which he does have a chronic compressed disc and some herniated disc in his lower back and he has overall general malaise.  He denies any weight loss, any fevers or chills.  He denies any hemoptysis or hematemesis.    Objective   Vital Signs:  /89   Pulse 95   Temp 98.8 °F (37.1 °C)   Resp 20   Wt 104 kg (228 lb 12.8 oz)   SpO2 94%   BMI 35.83 kg/m²   Estimated body mass index is 35.83 kg/m² as calculated from the following:    Height as of 2/14/23: 170.2 cm (67.01\").    Weight as of this encounter: 104 kg (228 lb 12.8 oz).           Physical Exam  Constitutional:       Appearance: Normal appearance.   HENT:      Head: Normocephalic.      Nose: Nose normal.   Cardiovascular:      Rate and Rhythm: Normal rate and regular rhythm.      Pulses: Normal pulses.      Heart sounds: Normal heart sounds.   Pulmonary:      Effort: Pulmonary effort is normal.      Breath sounds: Normal breath sounds. "   Musculoskeletal:         General: Normal range of motion.      Cervical back: Normal range of motion.   Skin:     General: Skin is warm.      Capillary Refill: Capillary refill takes less than 2 seconds.   Neurological:      General: No focal deficit present.      Mental Status: He is alert and oriented to person, place, and time.   Psychiatric:         Mood and Affect: Mood normal.         Behavior: Behavior normal.         Thought Content: Thought content normal.         Judgment: Judgment normal.        Result Review :  CT/PET scan was visualized and reviewed by myself, I agree with the interpretation.             Assessment and Plan   Diagnoses and all orders for this visit:    1. Lung mass (Primary)     is a pleasant 67-year-old gentleman who presents today for evaluation of his PET active mediastinal lymph nodes and pulmonary nodules.  In regards to this new right lower lobe pulmonary nodule, I will ask our colleagues at interventional radiology if they think this is amenable to CT-guided biopsy.  We will order that today.  In regards to the mediastinal lymphadenopathy that is PET positive, I will schedule him for a cervical mediastinoscopy to obtain tissue sampling and evaluate these mediastinal lymph nodes.       I spent 60 minutes caring for Trenton on this date of service. This time includes time spent by me in the following activities:preparing for the visit, reviewing tests, obtaining and/or reviewing a separately obtained history, performing a medically appropriate examination and/or evaluation , counseling and educating the patient/family/caregiver, ordering medications, tests, or procedures, referring and communicating with other health care professionals , documenting information in the medical record, independently interpreting results and communicating that information with the patient/family/caregiver and care coordination  Follow Up   No follow-ups on file.  Patient was given  instructions and counseling regarding his condition or for health maintenance advice. Please see specific information pulled into the AVS if appropriate.

## 2023-04-04 NOTE — PROGRESS NOTES
"Chief Complaint  Mediastinal lymphadenopathy.  Right lower lobe new pulmonary nodule that is hypermetabolic on PET scan  Subjective        Trenton Sears presents to [facility]  History of Present Illness  Mr. Sears is a pleasant 67-year-old gentleman who presents to thoracic surgery multidisciplinary clinic for evaluation of a newly found right lower lobe pulmonary nodule and mediastinal lymphadenopathy.  He is a current smoker and he has smoked approximately 45 years.  At his peak he smoked approximately 2 packs/day but now he is roughly a pack per day.  He stated this all started a few months ago when he started to have a cough that was treated with several rounds of antibiotics, this prompted a CT scan which showed bulky mediastinal lymphadenopathy and a few pulmonary nodules, subsequently a CT/PET scan was ordered and he presents today for evaluation of these metabolically active lesions.  His cough has resolved, he does state that he complains of some mild lower back pain which he does have a chronic compressed disc and some herniated disc in his lower back and he has overall general malaise.  He denies any weight loss, any fevers or chills.  He denies any hemoptysis or hematemesis.    Objective   Vital Signs:  /89   Pulse 95   Temp 98.8 °F (37.1 °C)   Resp 20   Wt 104 kg (228 lb 12.8 oz)   SpO2 94%   BMI 35.83 kg/m²   Estimated body mass index is 35.83 kg/m² as calculated from the following:    Height as of 2/14/23: 170.2 cm (67.01\").    Weight as of this encounter: 104 kg (228 lb 12.8 oz).           Physical Exam  Constitutional:       Appearance: Normal appearance.   HENT:      Head: Normocephalic.      Nose: Nose normal.   Cardiovascular:      Rate and Rhythm: Normal rate and regular rhythm.      Pulses: Normal pulses.      Heart sounds: Normal heart sounds.   Pulmonary:      Effort: Pulmonary effort is normal.      Breath sounds: Normal breath sounds. "   Musculoskeletal:         General: Normal range of motion.      Cervical back: Normal range of motion.   Skin:     General: Skin is warm.      Capillary Refill: Capillary refill takes less than 2 seconds.   Neurological:      General: No focal deficit present.      Mental Status: He is alert and oriented to person, place, and time.   Psychiatric:         Mood and Affect: Mood normal.         Behavior: Behavior normal.         Thought Content: Thought content normal.         Judgment: Judgment normal.        Result Review :  CT/PET scan was visualized and reviewed by myself, I agree with the interpretation.             Assessment and Plan   Diagnoses and all orders for this visit:    1. Lung mass (Primary)     is a pleasant 67-year-old gentleman who presents today for evaluation of his PET active mediastinal lymph nodes and pulmonary nodules.  In regards to this new right lower lobe pulmonary nodule, I will ask our colleagues at interventional radiology if they think this is amenable to CT-guided biopsy.  We will order that today.  In regards to the mediastinal lymphadenopathy that is PET positive, I will schedule him for a cervical mediastinoscopy to obtain tissue sampling and evaluate these mediastinal lymph nodes.       I spent 60 minutes caring for Trenton on this date of service. This time includes time spent by me in the following activities:preparing for the visit, reviewing tests, obtaining and/or reviewing a separately obtained history, performing a medically appropriate examination and/or evaluation , counseling and educating the patient/family/caregiver, ordering medications, tests, or procedures, referring and communicating with other health care professionals , documenting information in the medical record, independently interpreting results and communicating that information with the patient/family/caregiver and care coordination  Follow Up   No follow-ups on file.  Patient was given  instructions and counseling regarding his condition or for health maintenance advice. Please see specific information pulled into the AVS if appropriate.

## 2023-04-04 NOTE — LETTER
April 4, 2023     Amaury Wynn MD  [address] [place] [address]    Patient: Trenton Sears   YOB: 1955   Date of Visit: 4/4/2023       Dear Dr. Kingsley MD:    Thank you for referring Trenton Sears to me for evaluation. Below are the relevant portions of my assessment and plan of care.    If you have questions, please do not hesitate to call me. I look forward to following Trenton along with you.         Sincerely,        Francisco J Shultz MD PhD        CC: No Recipients    Francisco J Shultz MD PhD  04/04/23 1434  Signed  Chief Complaint  Mediastinal lymphadenopathy.  Right lower lobe new pulmonary nodule that is hypermetabolic on PET scan  Subjective         Trenton Sears presents to [facility]  History of Present Illness  Mr. Sears is a pleasant 67-year-old gentleman who presents to thoracic surgery multidisciplinary clinic for evaluation of a newly found right lower lobe pulmonary nodule and mediastinal lymphadenopathy.  He is a current smoker and he has smoked approximately 45 years.  At his peak he smoked approximately 2 packs/day but now he is roughly a pack per day.  He stated this all started a few months ago when he started to have a cough that was treated with several rounds of antibiotics, this prompted a CT scan which showed bulky mediastinal lymphadenopathy and a few pulmonary nodules, subsequently a CT/PET scan was ordered and he presents today for evaluation of these metabolically active lesions.  His cough has resolved, he does state that he complains of some mild lower back pain which he does have a chronic compressed disc and some herniated disc in his lower back and he has overall general malaise.  He denies any weight loss, any fevers or chills.  He denies any hemoptysis or hematemesis.    Objective    Vital Signs:  /89   Pulse 95   Temp 98.8 °F (37.1 °C)   Resp 20   Wt 104 kg (228 lb 12.8 oz)   SpO2 94%   BMI 35.83  "kg/m²   Estimated body mass index is 35.83 kg/m² as calculated from the following:    Height as of 2/14/23: 170.2 cm (67.01\").    Weight as of this encounter: 104 kg (228 lb 12.8 oz).           Physical Exam  Constitutional:       Appearance: Normal appearance.   HENT:      Head: Normocephalic.      Nose: Nose normal.   Cardiovascular:      Rate and Rhythm: Normal rate and regular rhythm.      Pulses: Normal pulses.      Heart sounds: Normal heart sounds.   Pulmonary:      Effort: Pulmonary effort is normal.      Breath sounds: Normal breath sounds.   Musculoskeletal:         General: Normal range of motion.      Cervical back: Normal range of motion.   Skin:     General: Skin is warm.      Capillary Refill: Capillary refill takes less than 2 seconds.   Neurological:      General: No focal deficit present.      Mental Status: He is alert and oriented to person, place, and time.   Psychiatric:         Mood and Affect: Mood normal.         Behavior: Behavior normal.         Thought Content: Thought content normal.         Judgment: Judgment normal.        Result Review :  CT/PET scan was visualized and reviewed by myself, I agree with the interpretation.            Assessment and Plan   Diagnoses and all orders for this visit:    1. Lung mass (Primary)     is a pleasant 67-year-old gentleman who presents today for evaluation of his PET active mediastinal lymph nodes and pulmonary nodules.  In regards to this new right lower lobe pulmonary nodule, I will ask our colleagues at interventional radiology if they think this is amenable to CT-guided biopsy.  We will order that today.  In regards to the mediastinal lymphadenopathy that is PET positive, I will schedule him for a cervical mediastinoscopy to obtain tissue sampling and evaluate these mediastinal lymph nodes.      I spent 60 minutes caring for Trenton on this date of service. This time includes time spent by me in the following activities:preparing for the " visit, reviewing tests, obtaining and/or reviewing a separately obtained history, performing a medically appropriate examination and/or evaluation , counseling and educating the patient/family/caregiver, ordering medications, tests, or procedures, referring and communicating with other health care professionals , documenting information in the medical record, independently interpreting results and communicating that information with the patient/family/caregiver and care coordination  Follow Up   No follow-ups on file.  Patient was given instructions and counseling regarding his condition or for health maintenance advice. Please see specific information pulled into the AVS if appropriate.

## 2023-04-04 NOTE — PATIENT INSTRUCTIONS
Met with patient and family member in lung clinic today. CT guided biopsy scheduled 4/17/23. Dr. Shultz's office will call patient to schedule cervical mediastinoscopy. Provided education pamphlets for CT guided biopsy. Explained pertinent information regarding preparation for and location of biopsy. Patient able to repeat back and verbalize understanding. No additional needs noted.  Pt will be seen for follow up appointment in lung clinic on  4/25/23.   Clau Atwood RN, BSN, OCN, CCM, Lung navigator

## 2023-04-11 ENCOUNTER — PRE-ADMISSION TESTING (OUTPATIENT)
Dept: PREADMISSION TESTING | Facility: HOSPITAL | Age: 68
End: 2023-04-11
Payer: MEDICARE

## 2023-04-11 VITALS
WEIGHT: 232 LBS | SYSTOLIC BLOOD PRESSURE: 154 MMHG | TEMPERATURE: 97.4 F | RESPIRATION RATE: 16 BRPM | HEIGHT: 67 IN | BODY MASS INDEX: 36.41 KG/M2 | HEART RATE: 91 BPM | DIASTOLIC BLOOD PRESSURE: 93 MMHG | OXYGEN SATURATION: 95 %

## 2023-04-11 DIAGNOSIS — R59.0 MEDIASTINAL LYMPHADENOPATHY: ICD-10-CM

## 2023-04-11 LAB
ANION GAP SERPL CALCULATED.3IONS-SCNC: 6 MMOL/L (ref 5–15)
BUN SERPL-MCNC: 8 MG/DL (ref 8–23)
BUN/CREAT SERPL: 11.8 (ref 7–25)
CALCIUM SPEC-SCNC: 9.4 MG/DL (ref 8.6–10.5)
CHLORIDE SERPL-SCNC: 97 MMOL/L (ref 98–107)
CO2 SERPL-SCNC: 28 MMOL/L (ref 22–29)
CREAT SERPL-MCNC: 0.68 MG/DL (ref 0.76–1.27)
DEPRECATED RDW RBC AUTO: 41.2 FL (ref 37–54)
EGFRCR SERPLBLD CKD-EPI 2021: 102.5 ML/MIN/1.73
ERYTHROCYTE [DISTWIDTH] IN BLOOD BY AUTOMATED COUNT: 15.6 % (ref 12.3–15.4)
GLUCOSE SERPL-MCNC: 118 MG/DL (ref 65–99)
HCT VFR BLD AUTO: 38.7 % (ref 37.5–51)
HGB BLD-MCNC: 12.5 G/DL (ref 13–17.7)
MCH RBC QN AUTO: 23.9 PG (ref 26.6–33)
MCHC RBC AUTO-ENTMCNC: 32.3 G/DL (ref 31.5–35.7)
MCV RBC AUTO: 74.1 FL (ref 79–97)
PLATELET # BLD AUTO: 384 10*3/MM3 (ref 140–450)
PMV BLD AUTO: 9.2 FL (ref 6–12)
POTASSIUM SERPL-SCNC: 3.6 MMOL/L (ref 3.5–5.2)
QT INTERVAL: 393 MS
RBC # BLD AUTO: 5.22 10*6/MM3 (ref 4.14–5.8)
SODIUM SERPL-SCNC: 131 MMOL/L (ref 136–145)
WBC NRBC COR # BLD: 7.44 10*3/MM3 (ref 3.4–10.8)

## 2023-04-11 PROCEDURE — 93005 ELECTROCARDIOGRAM TRACING: CPT

## 2023-04-11 PROCEDURE — 36415 COLL VENOUS BLD VENIPUNCTURE: CPT

## 2023-04-11 PROCEDURE — 93010 ELECTROCARDIOGRAM REPORT: CPT | Performed by: INTERNAL MEDICINE

## 2023-04-11 PROCEDURE — 80048 BASIC METABOLIC PNL TOTAL CA: CPT

## 2023-04-11 PROCEDURE — 85027 COMPLETE CBC AUTOMATED: CPT

## 2023-04-11 NOTE — DISCHARGE INSTRUCTIONS
Take the following medications the morning of surgery:    AMLODIPINE AND PEPCID    ARRIVE AT 5:15    If you are on prescription narcotic pain medication to control your pain you may also take that medication the morning of surgery.    General Instructions:  Do not eat solid food after midnight the night before surgery.  You may drink clear liquids day of surgery but must stop at least one hour before your hospital arrival time.  It is beneficial for you to have a clear drink that contains carbohydrates the day of surgery.  We suggest a 12 to 20 ounce bottle of Gatorade or Powerade for non-diabetic patients or a 12 to 20 ounce bottle of G2 or Powerade Zero for diabetic patients. (Pediatric patients, are not advised to drink a 12 to 20 ounce carbohydrate drink)    Clear liquids are liquids you can see through.  Nothing red in color.     Plain water                               Sports drinks  Sodas                                   Gelatin (Jell-O)  Fruit juices without pulp such as white grape juice and apple juice  Popsicles that contain no fruit or yogurt  Tea or coffee (no cream or milk added)  Gatorade / Powerade  G2 / Powerade Zero      Patients who avoid smoking, chewing tobacco and alcohol for 4 weeks prior to surgery have a reduced risk of post-operative complications.  Quit smoking as many days before surgery as you can.  Do not smoke, use chewing tobacco or drink alcohol the day of surgery.   If applicable bring your C-PAP/ BI-PAP machine in with you to preop day of surgery.  Bring any papers given to you in the doctor’s office.  Wear clean comfortable clothes.  Do not wear contact lenses, false eyelashes or make-up.  Bring a case for your glasses.   Bring crutches or walker if applicable.  Remove all piercings.  Leave jewelry and any other valuables at home.  Hair extensions with metal clips must be removed prior to surgery.  The Pre-Admission Testing nurse will instruct you to bring medications if unable  to obtain an accurate list in Pre-Admission Testing.            Preventing a Surgical Site Infection:  For 2 to 3 days before surgery, avoid shaving with a razor because the razor can irritate skin and make it easier to develop an infection.    Any areas of open skin can increase the risk of a post-operative wound infection by allowing bacteria to enter and travel throughout the body.  Notify your surgeon if you have any skin wounds / rashes even if it is not near the expected surgical site.  The area will need assessed to determine if surgery should be delayed until it is healed.  The night prior to surgery shower using a fresh bar of anti-bacterial soap (such as Dial) and clean washcloth.  Sleep in a clean bed with clean clothing.  Do not allow pets to sleep with you.  Shower on the morning of surgery using a fresh bar of anti-bacterial soap (such as Dial) and clean washcloth.  Dry with a clean towel and dress in clean clothing.  Ask your surgeon if you will be receiving antibiotics prior to surgery.  Make sure you, your family, and all healthcare providers clean their hands with soap and water or an alcohol based hand  before caring for you or your wound.    Day of surgery:  Your arrival time is approximately two hours before your scheduled surgery time.  Upon arrival, a Pre-op nurse and Anesthesiologist will review your health history, obtain vital signs, and answer questions you may have.  The only belongings needed at this time will be a list of your home medications and if applicable your C-PAP/BI-PAP machine.  A Pre-op nurse will start an IV and you may receive medication in preparation for surgery, including something to help you relax.     Please be aware that surgery does come with discomfort.  We want to make every effort to control your discomfort so please discuss any uncontrolled symptoms with your nurse.   Your doctor will most likely have prescribed pain medications.      If you are going  home after surgery you will receive individualized written care instructions before being discharged.  A responsible adult must drive you to and from the hospital on the day of your surgery and stay with you for 24 hours.  Discharge prescriptions can be filled by the hospital pharmacy during regular pharmacy hours.  If you are having surgery late in the day/evening your prescription may be e-prescribed to your pharmacy.  Please verify your pharmacy hours or chose a 24 hour pharmacy to avoid not having access to your prescription because your pharmacy has closed for the day.    If you are staying overnight following surgery, you will be transported to your hospital room following the recovery period.  [facility] has all private rooms.    If you have any questions please call Pre-Admission Testing at [phone].  Deductibles and co-payments are collected on the day of service. Please be prepared to pay the required co-pay, deductible or deposit on the day of service as defined by your plan.    Call your surgeon immediately if you experience any of the following symptoms:  Sore Throat  Shortness of Breath or difficulty breathing  Cough  Chills  Body soreness or muscle pain  Headache  Fever  New loss of taste or smell  Do not arrive for your surgery ill.  Your procedure will need to be rescheduled to another time.  You will need to call your physician before the day of surgery to avoid any unnecessary exposure to hospital staff as well as other patients.       CHLORHEXIDINE CLOTH INSTRUCTIONS  The morning of surgery follow these instructions using the Chlorhexidine cloths you've been given.  These steps reduce bacteria on the body.  Do not use the cloths near your eyes, ears mouth, genitalia or on open wounds.  Throw the cloths away after use but do not try to flush them down a toilet.      Open and remove one cloth at a time from the package.    Leave the cloth unfolded and begin the bathing.  Massage  the skin with the cloths using gentle pressure to remove bacteria.  Do not scrub harshly.   Follow the steps below with one 2% CHG cloth per area (6 total cloths).  One cloth for neck, shoulders and chest.  One cloth for both arms, hands, fingers and underarms (do underarms last).  One cloth for the abdomen followed by groin.  One cloth for right leg and foot including between the toes.  One cloth for left leg and foot including between the toes.  The last cloth is to be used for the back of the neck, back and buttocks.    Allow the CHG to air dry 3 minutes on the skin which will give it time to work and decrease the chance of irritation.  The skin may feel sticky until it is dry.  Do not rinse with water or any other liquid or you will lose the beneficial effects of the CHG.  If mild skin irritation occurs, do rinse the skin to remove the CHG.  Report this to the nurse at time of admission.  Do not apply lotions, creams, ointments, deodorants or perfumes after using the clothes. Dress in clean clothes before coming to the hospital.

## 2023-04-13 ENCOUNTER — ANESTHESIA EVENT (OUTPATIENT)
Dept: PERIOP | Facility: HOSPITAL | Age: 68
End: 2023-04-13
Payer: MEDICARE

## 2023-04-14 ENCOUNTER — ANESTHESIA (OUTPATIENT)
Dept: PERIOP | Facility: HOSPITAL | Age: 68
End: 2023-04-14
Payer: MEDICARE

## 2023-04-14 ENCOUNTER — APPOINTMENT (OUTPATIENT)
Dept: GENERAL RADIOLOGY | Facility: HOSPITAL | Age: 68
End: 2023-04-14
Payer: MEDICARE

## 2023-04-14 ENCOUNTER — HOSPITAL ENCOUNTER (OUTPATIENT)
Facility: HOSPITAL | Age: 68
Setting detail: HOSPITAL OUTPATIENT SURGERY
Discharge: HOME | End: 2023-04-14
Attending: STUDENT IN AN ORGANIZED HEALTH CARE EDUCATION/TRAINING PROGRAM | Admitting: STUDENT IN AN ORGANIZED HEALTH CARE EDUCATION/TRAINING PROGRAM
Payer: MEDICARE

## 2023-04-14 VITALS
HEART RATE: 100 BPM | DIASTOLIC BLOOD PRESSURE: 93 MMHG | RESPIRATION RATE: 22 BRPM | OXYGEN SATURATION: 92 % | TEMPERATURE: 98.2 F | SYSTOLIC BLOOD PRESSURE: 141 MMHG

## 2023-04-14 DIAGNOSIS — K21.00 GASTROESOPHAGEAL REFLUX DISEASE WITH ESOPHAGITIS WITHOUT HEMORRHAGE: ICD-10-CM

## 2023-04-14 DIAGNOSIS — R59.0 MEDIASTINAL LYMPHADENOPATHY: ICD-10-CM

## 2023-04-14 PROCEDURE — 25010000002 FENTANYL CITRATE (PF) 50 MCG/ML SOLUTION: Performed by: NURSE ANESTHETIST, CERTIFIED REGISTERED

## 2023-04-14 PROCEDURE — 71045 X-RAY EXAM CHEST 1 VIEW: CPT

## 2023-04-14 PROCEDURE — 25010000002 PROPOFOL 10 MG/ML EMULSION: Performed by: NURSE ANESTHETIST, CERTIFIED REGISTERED

## 2023-04-14 PROCEDURE — 25010000002 ONDANSETRON PER 1 MG: Performed by: NURSE ANESTHETIST, CERTIFIED REGISTERED

## 2023-04-14 PROCEDURE — 25010000002 MIDAZOLAM PER 1 MG: Performed by: ANESTHESIOLOGY

## 2023-04-14 PROCEDURE — 25010000002 CEFOXITIN PER 1 G: Performed by: STUDENT IN AN ORGANIZED HEALTH CARE EDUCATION/TRAINING PROGRAM

## 2023-04-14 PROCEDURE — 88341 IMHCHEM/IMCYTCHM EA ADD ANTB: CPT | Performed by: STUDENT IN AN ORGANIZED HEALTH CARE EDUCATION/TRAINING PROGRAM

## 2023-04-14 PROCEDURE — 88305 TISSUE EXAM BY PATHOLOGIST: CPT | Performed by: STUDENT IN AN ORGANIZED HEALTH CARE EDUCATION/TRAINING PROGRAM

## 2023-04-14 PROCEDURE — 39402 MEDIASTINOSCPY W/LMPH NOD BX: CPT | Performed by: STUDENT IN AN ORGANIZED HEALTH CARE EDUCATION/TRAINING PROGRAM

## 2023-04-14 PROCEDURE — 88342 IMHCHEM/IMCYTCHM 1ST ANTB: CPT | Performed by: STUDENT IN AN ORGANIZED HEALTH CARE EDUCATION/TRAINING PROGRAM

## 2023-04-14 DEVICE — ABSORBABLE HEMOSTAT (OXIDIZED REGENERATED CELLULOSE, U.S.P.)
Type: IMPLANTABLE DEVICE | Site: MEDIASTINUM | Status: FUNCTIONAL
Brand: SURGICEL

## 2023-04-14 RX ORDER — FLUMAZENIL 0.1 MG/ML
0.2 INJECTION INTRAVENOUS AS NEEDED
Status: DISCONTINUED | OUTPATIENT
Start: 2023-04-14 | End: 2023-04-14 | Stop reason: HOSPADM

## 2023-04-14 RX ORDER — FENTANYL CITRATE 50 UG/ML
50 INJECTION, SOLUTION INTRAMUSCULAR; INTRAVENOUS
Status: DISCONTINUED | OUTPATIENT
Start: 2023-04-14 | End: 2023-04-14 | Stop reason: HOSPADM

## 2023-04-14 RX ORDER — DROPERIDOL 2.5 MG/ML
0.62 INJECTION, SOLUTION INTRAMUSCULAR; INTRAVENOUS
Status: DISCONTINUED | OUTPATIENT
Start: 2023-04-14 | End: 2023-04-14 | Stop reason: HOSPADM

## 2023-04-14 RX ORDER — ROCURONIUM BROMIDE 10 MG/ML
INJECTION, SOLUTION INTRAVENOUS AS NEEDED
Status: DISCONTINUED | OUTPATIENT
Start: 2023-04-14 | End: 2023-04-14 | Stop reason: SURG

## 2023-04-14 RX ORDER — OXYCODONE AND ACETAMINOPHEN 7.5; 325 MG/1; MG/1
1 TABLET ORAL EVERY 4 HOURS PRN
Status: DISCONTINUED | OUTPATIENT
Start: 2023-04-14 | End: 2023-04-14 | Stop reason: HOSPADM

## 2023-04-14 RX ORDER — NALOXONE HCL 0.4 MG/ML
0.2 VIAL (ML) INJECTION AS NEEDED
Status: DISCONTINUED | OUTPATIENT
Start: 2023-04-14 | End: 2023-04-14 | Stop reason: HOSPADM

## 2023-04-14 RX ORDER — SODIUM CHLORIDE 0.9 % (FLUSH) 0.9 %
3-10 SYRINGE (ML) INJECTION AS NEEDED
Status: DISCONTINUED | OUTPATIENT
Start: 2023-04-14 | End: 2023-04-14 | Stop reason: HOSPADM

## 2023-04-14 RX ORDER — HYDRALAZINE HYDROCHLORIDE 20 MG/ML
5 INJECTION INTRAMUSCULAR; INTRAVENOUS
Status: DISCONTINUED | OUTPATIENT
Start: 2023-04-14 | End: 2023-04-14 | Stop reason: HOSPADM

## 2023-04-14 RX ORDER — BUPIVACAINE HYDROCHLORIDE 2.5 MG/ML
INJECTION, SOLUTION EPIDURAL; INFILTRATION; INTRACAUDAL AS NEEDED
Status: DISCONTINUED | OUTPATIENT
Start: 2023-04-14 | End: 2023-04-14 | Stop reason: HOSPADM

## 2023-04-14 RX ORDER — DIPHENHYDRAMINE HYDROCHLORIDE 50 MG/ML
12.5 INJECTION INTRAMUSCULAR; INTRAVENOUS
Status: DISCONTINUED | OUTPATIENT
Start: 2023-04-14 | End: 2023-04-14 | Stop reason: HOSPADM

## 2023-04-14 RX ORDER — LIDOCAINE HYDROCHLORIDE 10 MG/ML
0.5 INJECTION, SOLUTION EPIDURAL; INFILTRATION; INTRACAUDAL; PERINEURAL ONCE AS NEEDED
Status: DISCONTINUED | OUTPATIENT
Start: 2023-04-14 | End: 2023-04-14 | Stop reason: HOSPADM

## 2023-04-14 RX ORDER — ONDANSETRON 2 MG/ML
INJECTION INTRAMUSCULAR; INTRAVENOUS AS NEEDED
Status: DISCONTINUED | OUTPATIENT
Start: 2023-04-14 | End: 2023-04-14 | Stop reason: SURG

## 2023-04-14 RX ORDER — LIDOCAINE HYDROCHLORIDE 20 MG/ML
INJECTION, SOLUTION INFILTRATION; PERINEURAL AS NEEDED
Status: DISCONTINUED | OUTPATIENT
Start: 2023-04-14 | End: 2023-04-14 | Stop reason: SURG

## 2023-04-14 RX ORDER — FENTANYL CITRATE 50 UG/ML
INJECTION, SOLUTION INTRAMUSCULAR; INTRAVENOUS AS NEEDED
Status: DISCONTINUED | OUTPATIENT
Start: 2023-04-14 | End: 2023-04-14 | Stop reason: SURG

## 2023-04-14 RX ORDER — LABETALOL HYDROCHLORIDE 5 MG/ML
5 INJECTION, SOLUTION INTRAVENOUS
Status: DISCONTINUED | OUTPATIENT
Start: 2023-04-14 | End: 2023-04-14 | Stop reason: HOSPADM

## 2023-04-14 RX ORDER — PROPOFOL 10 MG/ML
VIAL (ML) INTRAVENOUS AS NEEDED
Status: DISCONTINUED | OUTPATIENT
Start: 2023-04-14 | End: 2023-04-14 | Stop reason: SURG

## 2023-04-14 RX ORDER — ACETAMINOPHEN 500 MG
1000 TABLET ORAL ONCE
Status: COMPLETED | OUTPATIENT
Start: 2023-04-14 | End: 2023-04-14

## 2023-04-14 RX ORDER — SODIUM CHLORIDE 0.9 % (FLUSH) 0.9 %
3 SYRINGE (ML) INJECTION EVERY 12 HOURS SCHEDULED
Status: DISCONTINUED | OUTPATIENT
Start: 2023-04-14 | End: 2023-04-14 | Stop reason: HOSPADM

## 2023-04-14 RX ORDER — FAMOTIDINE 10 MG/ML
20 INJECTION, SOLUTION INTRAVENOUS ONCE
Status: COMPLETED | OUTPATIENT
Start: 2023-04-14 | End: 2023-04-14

## 2023-04-14 RX ORDER — MAGNESIUM HYDROXIDE 1200 MG/15ML
LIQUID ORAL AS NEEDED
Status: DISCONTINUED | OUTPATIENT
Start: 2023-04-14 | End: 2023-04-14 | Stop reason: HOSPADM

## 2023-04-14 RX ORDER — HYDROCODONE BITARTRATE AND ACETAMINOPHEN 7.5; 325 MG/1; MG/1
1 TABLET ORAL ONCE AS NEEDED
Status: COMPLETED | OUTPATIENT
Start: 2023-04-14 | End: 2023-04-14

## 2023-04-14 RX ORDER — IPRATROPIUM BROMIDE AND ALBUTEROL SULFATE 2.5; .5 MG/3ML; MG/3ML
3 SOLUTION RESPIRATORY (INHALATION) ONCE AS NEEDED
Status: DISCONTINUED | OUTPATIENT
Start: 2023-04-14 | End: 2023-04-14 | Stop reason: HOSPADM

## 2023-04-14 RX ORDER — ONDANSETRON 2 MG/ML
4 INJECTION INTRAMUSCULAR; INTRAVENOUS ONCE AS NEEDED
Status: DISCONTINUED | OUTPATIENT
Start: 2023-04-14 | End: 2023-04-14 | Stop reason: HOSPADM

## 2023-04-14 RX ORDER — SODIUM CHLORIDE, SODIUM LACTATE, POTASSIUM CHLORIDE, CALCIUM CHLORIDE 600; 310; 30; 20 MG/100ML; MG/100ML; MG/100ML; MG/100ML
100 INJECTION, SOLUTION INTRAVENOUS CONTINUOUS
Status: DISCONTINUED | OUTPATIENT
Start: 2023-04-14 | End: 2023-04-14 | Stop reason: HOSPADM

## 2023-04-14 RX ORDER — HYDROMORPHONE HYDROCHLORIDE 1 MG/ML
0.5 INJECTION, SOLUTION INTRAMUSCULAR; INTRAVENOUS; SUBCUTANEOUS
Status: DISCONTINUED | OUTPATIENT
Start: 2023-04-14 | End: 2023-04-14 | Stop reason: HOSPADM

## 2023-04-14 RX ORDER — GABAPENTIN 300 MG/1
300 CAPSULE ORAL ONCE
Status: COMPLETED | OUTPATIENT
Start: 2023-04-14 | End: 2023-04-14

## 2023-04-14 RX ORDER — EPHEDRINE SULFATE 50 MG/ML
5 INJECTION, SOLUTION INTRAVENOUS ONCE AS NEEDED
Status: DISCONTINUED | OUTPATIENT
Start: 2023-04-14 | End: 2023-04-14 | Stop reason: HOSPADM

## 2023-04-14 RX ORDER — MIDAZOLAM HYDROCHLORIDE 1 MG/ML
0.5 INJECTION INTRAMUSCULAR; INTRAVENOUS
Status: DISCONTINUED | OUTPATIENT
Start: 2023-04-14 | End: 2023-04-14 | Stop reason: HOSPADM

## 2023-04-14 RX ORDER — CELECOXIB 200 MG/1
200 CAPSULE ORAL ONCE
Status: COMPLETED | OUTPATIENT
Start: 2023-04-14 | End: 2023-04-14

## 2023-04-14 RX ORDER — PROMETHAZINE HYDROCHLORIDE 25 MG/1
25 SUPPOSITORY RECTAL ONCE AS NEEDED
Status: DISCONTINUED | OUTPATIENT
Start: 2023-04-14 | End: 2023-04-14 | Stop reason: HOSPADM

## 2023-04-14 RX ORDER — PROMETHAZINE HYDROCHLORIDE 25 MG/1
25 TABLET ORAL ONCE AS NEEDED
Status: DISCONTINUED | OUTPATIENT
Start: 2023-04-14 | End: 2023-04-14 | Stop reason: HOSPADM

## 2023-04-14 RX ORDER — SODIUM CHLORIDE, SODIUM LACTATE, POTASSIUM CHLORIDE, CALCIUM CHLORIDE 600; 310; 30; 20 MG/100ML; MG/100ML; MG/100ML; MG/100ML
9 INJECTION, SOLUTION INTRAVENOUS CONTINUOUS
Status: DISCONTINUED | OUTPATIENT
Start: 2023-04-14 | End: 2023-04-14 | Stop reason: HOSPADM

## 2023-04-14 RX ADMIN — FENTANYL CITRATE 50 MCG: 50 INJECTION, SOLUTION INTRAMUSCULAR; INTRAVENOUS at 07:15

## 2023-04-14 RX ADMIN — SODIUM CHLORIDE, POTASSIUM CHLORIDE, SODIUM LACTATE AND CALCIUM CHLORIDE 9 ML/HR: 600; 310; 30; 20 INJECTION, SOLUTION INTRAVENOUS at 06:48

## 2023-04-14 RX ADMIN — LIDOCAINE HYDROCHLORIDE 100 MG: 20 INJECTION, SOLUTION INFILTRATION; PERINEURAL at 07:15

## 2023-04-14 RX ADMIN — FENTANYL CITRATE 25 MCG: 50 INJECTION, SOLUTION INTRAMUSCULAR; INTRAVENOUS at 07:38

## 2023-04-14 RX ADMIN — ONDANSETRON 4 MG: 2 INJECTION INTRAMUSCULAR; INTRAVENOUS at 08:08

## 2023-04-14 RX ADMIN — PROPOFOL 200 MG: 10 INJECTION, EMULSION INTRAVENOUS at 07:15

## 2023-04-14 RX ADMIN — CEFOXITIN SODIUM 2 G: 2 POWDER, FOR SOLUTION INTRAVENOUS at 06:54

## 2023-04-14 RX ADMIN — CELECOXIB 200 MG: 200 CAPSULE ORAL at 06:45

## 2023-04-14 RX ADMIN — ACETAMINOPHEN 1000 MG: 500 TABLET ORAL at 06:45

## 2023-04-14 RX ADMIN — MIDAZOLAM 0.5 MG: 1 INJECTION INTRAMUSCULAR; INTRAVENOUS at 06:51

## 2023-04-14 RX ADMIN — HYDROCODONE BITARTRATE AND ACETAMINOPHEN 1 TABLET: 7.5; 325 TABLET ORAL at 10:44

## 2023-04-14 RX ADMIN — FENTANYL CITRATE 25 MCG: 50 INJECTION, SOLUTION INTRAMUSCULAR; INTRAVENOUS at 07:43

## 2023-04-14 RX ADMIN — FAMOTIDINE 20 MG: 10 INJECTION INTRAVENOUS at 06:48

## 2023-04-14 RX ADMIN — ROCURONIUM BROMIDE 70 MG: 10 INJECTION, SOLUTION INTRAVENOUS at 07:15

## 2023-04-14 RX ADMIN — GABAPENTIN 300 MG: 300 CAPSULE ORAL at 06:45

## 2023-04-14 RX ADMIN — SUGAMMADEX 200 MG: 100 INJECTION, SOLUTION INTRAVENOUS at 08:08

## 2023-04-14 RX ADMIN — FENTANYL CITRATE 50 MCG: 50 INJECTION, SOLUTION INTRAMUSCULAR; INTRAVENOUS at 08:57

## 2023-04-14 NOTE — ANESTHESIA PROCEDURE NOTES
Airway  Urgency: elective    Date/Time: 4/14/2023 7:28 AM  Airway not difficult    General Information and Staff    Patient location during procedure: OR  Anesthesiologist: Edin Wallace MD  CRNA/CAA: Willow Gaitan CRNA    Indications and Patient Condition  Indications for airway management: airway protection    Preoxygenated: yes  MILS not maintained throughout  Mask difficulty assessment: 1 - vent by mask    Final Airway Details  Final airway type: endotracheal airway      Successful airway: ETT  Cuffed: yes   Successful intubation technique: direct laryngoscopy  Facilitating devices/methods: intubating stylet  Endotracheal tube insertion site: oral  Blade: Leonor  Blade size: 3  ETT size (mm): 8.0  Cormack-Lehane Classification: grade I - full view of glottis  Placement verified by: chest auscultation   Cuff volume (mL): 9  Measured from: lips  Number of attempts at approach: 1  Assessment: lips, teeth, and gum same as pre-op and atraumatic intubation    Additional Comments  PreO2 100% face mask, IV induction, easy mask, DVL x1, cords noted, tube through, cuff up, EBBSH, +etCO2, = chest movement, tube secured in place, atraumatic, teeth and lips intact as preop.

## 2023-04-14 NOTE — ANESTHESIA PREPROCEDURE EVALUATION
Anesthesia Evaluation     NPO Solid Status: > 8 hours             Airway   Mallampati: II  Dental    (+) poor dentition    Comment: Missing multiple teeth      Pulmonary    (+) a smoker Former, lung cancer, COPD, sleep apnea,     ROS comment: Positive CALLY screen/Positive CALLY diagnosis and 2 or more mitigating factors used (recovery in non-supine position and multimodal analgesia)    Cardiovascular     (+) hypertension, past MI (age 33) , VITALE, hyperlipidemia,   (-) angina, cardiac stents      Neuro/Psych  GI/Hepatic/Renal/Endo    (+) obesity,  GERD,      Musculoskeletal     (+) back pain,   Abdominal    Substance History      OB/GYN          Other   arthritis,    history of cancer                  Anesthesia Plan    ASA 3     general       Anesthetic plan, risks, benefits, and alternatives have been provided, discussed and informed consent has been obtained with: patient.        CODE STATUS:

## 2023-04-14 NOTE — ANESTHESIA POSTPROCEDURE EVALUATION
Patient: Trenton Sears    Procedure Summary     Date: 04/14/23 Room / Location: [facility] OR 09 / [facility] MAIN OR    Anesthesia Start: 0700 Anesthesia Stop: 0833    Procedure: MEDIASTINOSCOPY (Chest) Diagnosis:       Mediastinal lymphadenopathy      (Mediastinal lymphadenopathy [R59.0])    Surgeons: Francisco J Shultz MD PhD Provider: Edin Wallace MD    Anesthesia Type: general ASA Status: 3          Anesthesia Type: general    Vitals  Vitals Value Taken Time   /93 04/14/23 0916   Temp 36.8 °C (98.2 °F) 04/14/23 0828   Pulse 105 04/14/23 0928   Resp 22 04/14/23 0915   SpO2 98 % 04/14/23 0928   Vitals shown include unvalidated device data.        Post Anesthesia Care and Evaluation    Pain management: adequate    Airway patency: patent  Anesthetic complications: No anesthetic complications    Cardiovascular status: acceptable  Respiratory status: acceptable  Hydration status: acceptable    Comments: BP (!) 188/90 (BP Location: Left arm, Patient Position: Lying)   Pulse 95   Temp 36.8 °C (98.2 °F) (Oral)   Resp 22   SpO2 94%

## 2023-04-14 NOTE — OP NOTE
MEDIASTINOSCOPY  Procedure Report    Patient Name:  Trenton Sears  YOB: 1955    Date of Surgery:  4/14/2023     Indications: PET positive mediastinal lymphadenopathy    Pre-op Diagnosis:   Mediastinal lymphadenopathy [R59.0]       Post-Op Diagnosis Codes:     * Mediastinal lymphadenopathy [R59.0]    Procedure/CPT® Codes:      Procedure(s):  MEDIASTINOSCOPY    Staff:  Surgeon(s):  Francisco J Shultz MD PhD    Assistant: Omkar Weldon CSA was responsible for performing the following activities: Retraction, Suction, Irrigation, Suturing, Closing, Placing Dressing and Held/Positioned Camera and their skilled assistance was necessary for the success of this case.        Anesthesia: General    Estimated Blood Loss: 7 mL    Implants:    Implant Name Type Inv. Item Serial No.  Lot No. LRB No. Used Action   HEMOST ABS SURGICEL 2X14 - [id number] Implant HEMOST ABS SURGICEL 2X14  [employer]  N/A 1 Implanted   This was removed prior to skin closure     Specimen:          Specimens     ID Source Type Tests Collected By Collected At Frozen?    A Lymph Node Tissue · TISSUE PATHOLOGY EXAM   Francisco J Shultz MD PhD 4/14/23 0750 No    Description: 4R LYMPH NODE FOR PERMANENT    This specimen was not marked as sent.    B Lymph Node Tissue · TISSUE PATHOLOGY EXAM   Francisco J Shultz MD PhD 4/14/23 0753 No    Description: 4R #2 LYMPH NODE FOR PERMANENT    This specimen was not marked as sent.    C Lymph Node Tissue · TISSUE PATHOLOGY EXAM   Francisco J Shultz MD PhD 4/14/23 0754 No    Description: 4R #3 LYMPH NODE FOR PERMANENT    This specimen was not marked as sent.    D Lymph Node Tissue · TISSUE PATHOLOGY EXAM   Francisco J Shultz MD PhD 4/14/23 0756 No    Description: LEVEL 7 LYMPH NODE FOR PERMANENT    This specimen was not marked as sent.            Findings: Station 4R and 7 lymph nodes appreciated.  No left-sided station no station 2R lymph node noted.    Complications:  None    Description of Procedure: Trenton Sears was identified in the preoperative holding area and again his consent for the procedure was verified.  He was transported to the operating room and placed on the operating room table in supine position.  A general anesthetic was successfully administered and an endotracheal tube was placed without difficulty.  A timeout was performed to verify correct patient, correct procedure, and correct administration of IV antibiotics per [facility] protocol.  The patient was prepped and draped in standard sterile fashion.  A 2cm incision was made approximately 2cm above the sternal notch.  Dissection was carried down through the skin and soft tissues, the platysma was divided, the strap muscles were  along the median raphe.  The trachea was encountered and blunt dissection was used to open the pretracheal space.   A mediastinoscope was inserted into the mediastinum.  The subcarinal space was identified a station 7 lymph node was identified, it was dissected free and then sampled for pathology.  This was sent off for permanent pathology.  Hemostasis was obtained after biopsy. The right paratracheal space was examined and a right level four lymph node was was identified, was dissected free and clear of attachments and then sampled for permanent pathology.  The left paratracheal space was examined and no 4L or 2L lymph nodes were identified..  The mediastinum was packed for five minutes to ensure hemostasis.  There was no evidence of bleeding when the packing was removed.   The incisions were closed with Vicryl and Monocryl in standard fashion.  Dermabond was applied as dressing and the counts were correct at the end of the case.  The patient tolerated this procedure well, was extubated and transported to the recovery room in stable condition.          Francisco J Shultz MD PhD     Date: 4/14/2023  Time: 08:27 EDT

## 2023-04-17 ENCOUNTER — HOSPITAL ENCOUNTER (OUTPATIENT)
Dept: CT IMAGING | Facility: HOSPITAL | Age: 68
Discharge: HOME | End: 2023-04-17
Payer: MEDICARE

## 2023-04-17 ENCOUNTER — HOSPITAL ENCOUNTER (OUTPATIENT)
Dept: GENERAL RADIOLOGY | Facility: HOSPITAL | Age: 68
Discharge: HOME | End: 2023-04-17
Payer: MEDICARE

## 2023-04-17 VITALS
OXYGEN SATURATION: 97 % | SYSTOLIC BLOOD PRESSURE: 159 MMHG | HEART RATE: 75 BPM | BODY MASS INDEX: 36.1 KG/M2 | HEIGHT: 67 IN | WEIGHT: 230 LBS | TEMPERATURE: 98.4 F | DIASTOLIC BLOOD PRESSURE: 89 MMHG | RESPIRATION RATE: 18 BRPM

## 2023-04-17 DIAGNOSIS — R91.1 SOLITARY PULMONARY NODULE: ICD-10-CM

## 2023-04-17 LAB
INR PPP: 1.2 (ref 0.8–1.2)
LAB AP CASE REPORT: NORMAL
LAB AP SPECIAL STAINS: NORMAL
PATH REPORT.FINAL DX SPEC: NORMAL
PATH REPORT.GROSS SPEC: NORMAL
PLATELET # BLD AUTO: 391 10*3/MM3 (ref 140–450)
PROTHROMBIN TIME: 13.9 SECONDS (ref 12.8–15.2)

## 2023-04-17 PROCEDURE — 85049 AUTOMATED PLATELET COUNT: CPT | Performed by: RADIOLOGY

## 2023-04-17 PROCEDURE — 88305 TISSUE EXAM BY PATHOLOGIST: CPT | Performed by: STUDENT IN AN ORGANIZED HEALTH CARE EDUCATION/TRAINING PROGRAM

## 2023-04-17 PROCEDURE — 85610 PROTHROMBIN TIME: CPT

## 2023-04-17 PROCEDURE — 71045 X-RAY EXAM CHEST 1 VIEW: CPT

## 2023-04-17 PROCEDURE — 25010000002 MIDAZOLAM PER 1 MG: Performed by: RADIOLOGY

## 2023-04-17 PROCEDURE — 0 LIDOCAINE 1 % SOLUTION: Performed by: RADIOLOGY

## 2023-04-17 PROCEDURE — 25010000002 FENTANYL CITRATE (PF) 50 MCG/ML SOLUTION: Performed by: RADIOLOGY

## 2023-04-17 RX ORDER — LIDOCAINE HYDROCHLORIDE 10 MG/ML
20 INJECTION, SOLUTION INFILTRATION; PERINEURAL ONCE
Status: COMPLETED | OUTPATIENT
Start: 2023-04-17 | End: 2023-04-17

## 2023-04-17 RX ORDER — MIDAZOLAM HYDROCHLORIDE 1 MG/ML
0.5 INJECTION INTRAMUSCULAR; INTRAVENOUS ONCE AS NEEDED
Status: DISCONTINUED | OUTPATIENT
Start: 2023-04-17 | End: 2023-04-18 | Stop reason: HOSPADM

## 2023-04-17 RX ORDER — SODIUM CHLORIDE 0.9 % (FLUSH) 0.9 %
10 SYRINGE (ML) INJECTION AS NEEDED
Status: DISCONTINUED | OUTPATIENT
Start: 2023-04-17 | End: 2023-04-18 | Stop reason: HOSPADM

## 2023-04-17 RX ORDER — SODIUM CHLORIDE 9 MG/ML
25 INJECTION, SOLUTION INTRAVENOUS ONCE
Status: DISCONTINUED | OUTPATIENT
Start: 2023-04-17 | End: 2023-04-18 | Stop reason: HOSPADM

## 2023-04-17 RX ORDER — MIDAZOLAM HYDROCHLORIDE 1 MG/ML
INJECTION INTRAMUSCULAR; INTRAVENOUS AS NEEDED
Status: COMPLETED | OUTPATIENT
Start: 2023-04-17 | End: 2023-04-17

## 2023-04-17 RX ORDER — FENTANYL CITRATE 50 UG/ML
INJECTION, SOLUTION INTRAMUSCULAR; INTRAVENOUS AS NEEDED
Status: COMPLETED | OUTPATIENT
Start: 2023-04-17 | End: 2023-04-17

## 2023-04-17 RX ADMIN — LIDOCAINE HYDROCHLORIDE 20 ML: 10 INJECTION, SOLUTION INFILTRATION; PERINEURAL at 08:18

## 2023-04-17 RX ADMIN — MIDAZOLAM 1 MG: 1 INJECTION INTRAMUSCULAR; INTRAVENOUS at 08:18

## 2023-04-17 RX ADMIN — FENTANYL CITRATE 50 MCG: 50 INJECTION, SOLUTION INTRAMUSCULAR; INTRAVENOUS at 08:18

## 2023-04-17 NOTE — POST-PROCEDURE NOTE
POST PROCEDURE NOTE    Procedure: ct right lung biopsy    Pre-Procedure Diagnosis: rll nodule    Post-procedure Diagnosis: same    Findings: technically successful ct guided biopsy of 10 mm rll nodule    Complications: no immediate    Blood loss: none    Specimen Removed: 20 gauge core    Disposition:   Discharge home

## 2023-04-17 NOTE — DISCHARGE INSTRUCTIONS

## 2023-04-18 ENCOUNTER — TELEPHONE (OUTPATIENT)
Dept: INTERVENTIONAL RADIOLOGY/VASCULAR | Facility: HOSPITAL | Age: 68
End: 2023-04-18
Payer: COMMERCIAL

## 2023-04-18 LAB
LAB AP CASE REPORT: NORMAL
LAB AP CLINICAL INFORMATION: NORMAL
PATH REPORT.FINAL DX SPEC: NORMAL
PATH REPORT.GROSS SPEC: NORMAL

## 2023-04-25 ENCOUNTER — OFFICE VISIT (OUTPATIENT)
Dept: OTHER | Facility: HOSPITAL | Age: 68
End: 2023-04-25
Payer: MEDICARE

## 2023-04-25 VITALS
RESPIRATION RATE: 20 BRPM | WEIGHT: 228.3 LBS | OXYGEN SATURATION: 95 % | SYSTOLIC BLOOD PRESSURE: 142 MMHG | TEMPERATURE: 98 F | DIASTOLIC BLOOD PRESSURE: 79 MMHG | HEART RATE: 100 BPM | BODY MASS INDEX: 35.76 KG/M2

## 2023-04-25 DIAGNOSIS — R91.8 LUNG MASS: Primary | ICD-10-CM

## 2023-04-25 DIAGNOSIS — R06.02 SHORTNESS OF BREATH: ICD-10-CM

## 2023-04-25 PROCEDURE — G0463 HOSPITAL OUTPT CLINIC VISIT: HCPCS

## 2023-04-25 NOTE — PATIENT INSTRUCTIONS
Met with patient and wife in lung clinic. CT chest will be scheduled in 3 months with a follow up appointment in Dr. Shultz's office after. Coretta will call with dates/times of appts. Patient verbalized understanding. No additional needs noted. Clau Atwood RN, BSN, OCN, CCM, Lung navigator

## 2023-04-25 NOTE — PROGRESS NOTES
"Chief Complaint  Mediastinal lymphadenopathy, pulmonary nodule, history of cigarette and vape use  Subjective        Trenton Sears presents to [facility]  History of Present Illness  Mr. Sears is a pleasant 67-year-old gentleman who presents to multidisciplinary clinic today in regards to mediastinal lymphadenopathy and a right lower lobe pulmonary nodule.  In the interim he has undergone a cervical mediastinoscopy by myself which did not show signs of malignancy and he underwent a CT-guided biopsy of a right lower lobe pulmonary nodule but also did not show signs of malignancy.  He does have a history of smoking.  He occasionally smokes 1 cigarette now and again but is not an active daily smoker any longer.  He did have a history of cigarette smoke where he smoked 1 to 2 packs/day in the past.  He still complains of some shortness of breath and difficulty breathing.  Especially at nighttime.  And he is complaining of on and off cough.  No other complaints today on follow-up.    Objective   Vital Signs:  /79   Pulse 100   Temp 98 °F (36.7 °C)   Resp 20   Wt 104 kg (228 lb 4.8 oz)   SpO2 95%   BMI 35.76 kg/m²   Estimated body mass index is 35.76 kg/m² as calculated from the following:    Height as of 4/17/23: 170.2 cm (67\").    Weight as of this encounter: 104 kg (228 lb 4.8 oz).           Physical Exam  Vitals reviewed.   Constitutional:       Appearance: Normal appearance.   HENT:      Head: Normocephalic.   Cardiovascular:      Rate and Rhythm: Normal rate and regular rhythm.      Pulses: Normal pulses.      Heart sounds: Normal heart sounds.   Pulmonary:      Effort: Pulmonary effort is normal.      Breath sounds: Wheezing present.   Musculoskeletal:         General: Normal range of motion.   Skin:     General: Skin is warm.      Capillary Refill: Capillary refill takes less than 2 seconds.      Comments: Neck incision is healing nicely.  Clean dry and intact. "   Neurological:      General: No focal deficit present.      Mental Status: He is alert and oriented to person, place, and time. Mental status is at baseline.   Psychiatric:         Mood and Affect: Mood normal.         Behavior: Behavior normal.         Thought Content: Thought content normal.         Judgment: Judgment normal.        Result Review :  Pathology from his recent mediastinoscopy and CT-guided biopsy were both reviewed by myself.  No signs of malignancy was noted.             Assessment and Plan   Diagnoses and all orders for this visit:    1. Lung mass (Primary)  -     CT Chest Without Contrast; Future    2. Shortness of breath  -     Ambulatory Referral to Pulmonology    Mr. Sears is a pleasant 67-year-old gentleman we are asked to see initially for incidentally found pulmonary nodule and a PET active mediastinal lymphadenopathy.  He underwent tissue sampling of both of these areas and both come back benign.  Due to the fact that he has a significant smoking history, we will continue to monitor these areas with serial surveillance.  I will order a noncontrast CT scan for 3 months to see if any of these areas grow in size.    In regards to his shortness of breath and cough, I will send him to pulmonology for evaluation.  He did state that he does have a CPAP machine at nighttime but does not currently wear it due to it being uncomfortable.  He can follow-up with myself in 3 months with a Noncon CT scan of the chest.       I spent 35 minutes caring for Trenton on this date of service. This time includes time spent by me in the following activities:preparing for the visit, reviewing tests, obtaining and/or reviewing a separately obtained history, performing a medically appropriate examination and/or evaluation , counseling and educating the patient/family/caregiver, ordering medications, tests, or procedures, referring and communicating with other health care professionals , documenting information in  the medical record, independently interpreting results and communicating that information with the patient/family/caregiver and care coordination  Follow Up   No follow-ups on file.  Patient was given instructions and counseling regarding his condition or for health maintenance advice. Please see specific information pulled into the AVS if appropriate.

## 2023-04-27 ENCOUNTER — TELEPHONE (OUTPATIENT)
Dept: OTHER | Facility: HOSPITAL | Age: 68
End: 2023-04-27
Payer: COMMERCIAL

## 2023-04-27 NOTE — TELEPHONE ENCOUNTER
Patient is scheduled to see  [facility] in [place] on 6- at 12pm.  Patient would like all other follow-ups in the [place] office.  Patient has been notified of appt and will request his future appts with  to be in the [place] office.

## 2023-05-04 ENCOUNTER — TRANSCRIBE ORDERS (OUTPATIENT)
Dept: MRI IMAGING | Facility: HOSPITAL | Age: 68
End: 2023-05-04
Payer: COMMERCIAL

## 2023-05-04 DIAGNOSIS — M54.50 LOW BACK PAIN, UNSPECIFIED BACK PAIN LATERALITY, UNSPECIFIED CHRONICITY, UNSPECIFIED WHETHER SCIATICA PRESENT: Primary | ICD-10-CM

## 2023-05-16 ENCOUNTER — OFFICE VISIT (OUTPATIENT)
Dept: GASTROENTEROLOGY | Facility: CLINIC | Age: 68
End: 2023-05-16
Payer: COMMERCIAL

## 2023-05-16 VITALS
DIASTOLIC BLOOD PRESSURE: 86 MMHG | SYSTOLIC BLOOD PRESSURE: 158 MMHG | BODY MASS INDEX: 36.32 KG/M2 | HEIGHT: 67 IN | WEIGHT: 231.4 LBS

## 2023-05-16 DIAGNOSIS — Z86.010 PERSONAL HISTORY OF COLONIC POLYPS: ICD-10-CM

## 2023-05-16 DIAGNOSIS — K21.00 GASTROESOPHAGEAL REFLUX DISEASE WITH ESOPHAGITIS WITHOUT HEMORRHAGE: Primary | ICD-10-CM

## 2023-05-16 RX ORDER — DOXYCYCLINE HYCLATE 100 MG/1
CAPSULE ORAL
COMMUNITY
Start: 2023-05-05

## 2023-05-16 NOTE — PROGRESS NOTES
"    PATIENT INFORMATION  Trenton Sears       - 1955    CHIEF COMPLAINT  Chief Complaint   Patient presents with   • Heartburn   • Ulcerative Esophagitis       HISTORY OF PRESENT ILLNESS  Here today today for GERD follow-up     Per LOV: Managed on daily omeprazole and pepcid for several years. Has been out for a week or so and now some dyspepsia. No odynophagia, dysphagia, nausea, vomiting, abdominal pain, bloating, belching, reflux. Rare breakthrough symptoms. Daily ibuprofen use for chronic back pain. Does say he gets kana horses in stomach, onset with twisting can be either side, last time started with twisted. Lasted 5 min. Not worse since being out of medication. Increased PPI to BID LOV and no improvement today. Feels like its muscle, 3-4 times a week, stretches and can feel a knot, no immediate relief, been going on for years. Cramps probably close to a year. Never been treated for HP. Added carafate QID. Left side remains tender, but cramping less often and less severe.     Daily BM, usually easy to pass and feels complete. No bleeding or rectal pain. Not straining or bleeding.     EGD 10/21/2019 with normal duodenum, reactive gastritis, ulcerative esophagitis, negative for Barretts or HP. Had laryngospasm after EGD, so planned colon was cancelled at that time. Cancelled colon scheduled 2022, due to inadequate prep, was vomiting with prep, was unaware of mag citrate replacement. Need colon performed at hospital.     Currently being treated for \"organizing pneumonia\" several rounds of abx, sounds like he may need bronch.      REVIEWED PERTINENT RESULTS/ LABS  Lab Results   Component Value Date    CASEREPORT  2023     Surgical Pathology Report                         Case: [id number]                                  Authorizing Provider:  Francisco J Shultz MD PhD   Collected:           2023 08:25 AM          Ordering Location:     [facility]  Received:            " 04/17/2023 08:49 AM                                 CT                                                                           Pathologist:           Eileen Anaya MD                                                          Specimen:    Lung, ct guided biopsy right lower lung nodule                                             FINALDX  04/17/2023     1. Lung, Right Lower Lobe, Core Biopsy:     A. Organizing pneumonia.   B. No dysplasia nor malignancy.   C. No granulomata, diagnostic viral inclusions nor fungal organisms identified.    MEC/clm       Lab Results   Component Value Date    HGB 12.5 (L) 04/11/2023    MCV 74.1 (L) 04/11/2023     04/17/2023    ALT 36 08/18/2021    AST 21 08/18/2021    INR 1.2 04/17/2023      XR Chest 1 View    Result Date: 4/17/2023  Narrative: AP CHEST  HISTORY: Post right lung biopsy  COMPARISON: Earlier today  FINDINGS: There is no appreciable pneumothorax. No new infiltrates in the lungs. Stable hilar adenopathy. Heart size stable.      Impression: No appreciable pneumothorax  This report was finalized on 4/17/2023 11:51 AM by Dr. Kiet Adams M.D.      XR Chest 1 View    Result Date: 4/17/2023  Narrative: PA CHEST  HISTORY: Post right lung biopsy  COMPARISON: 04/14/2023  FINDINGS: There is no appreciable pneumothorax. The small nodule at right lung base that was biopsied earlier is not radiographically apparent. Stable hilar adenopathy. Heart size stable.      Impression: No appreciable pneumothorax  This report was finalized on 4/17/2023 9:54 AM by Dr. Kiet Adams M.D.      CT Needle Biopsy Lung    Result Date: 4/17/2023  Narrative: PROCEDURE: CT guided right lung biopsy  HISTORY: RLL pulmonary nodule; R91.1-Solitary pulmonary nodule  TECHNIQUE: Radiation dose reduction techniques were utilized, including automated exposure control and exposure modulation based on body size.  The procedural risks, benefits, and alternatives were discussed with the patient, including  risks of pneumothorax and bleeding and possibility of chest tube placement. Informed consent was obtained.    The patient was placed in the prone position on the CT procedure table. Axial CT scan was performed to localize the 10 mm nodule in the base of the right lower lobe. The overlying skin was prepped and draped in the usual sterile fashion. 1% buffered lidocaine was used for local anesthesia.  Next, a 19-gauge coaxial needle was advanced into the lesion under CT guidance. A single 20-gauge core biopsy was then obtained and sent to pathology. The needle was removed and sterile dressing applied. No immediate complications.      Impression: Technically successful CT guided right lung biopsy.  Moderate sedation was provided under my direct supervision using 1 mg IV Versed and 50 mcg IV Fentanyl. The patient was independently monitored by a trained Department of Radiology RN using automated blood pressure, EKG, and pulse oximetry. My total intra-service time was 10 minutes.      Radiation dose reduction techniques were utilized, including automated exposure control and exposure modulation based on body size.  This report was finalized on 4/17/2023 10:46 AM by Dr. Kiet Adams M.D.        REVIEW OF SYSTEMS  Review of Systems   Constitutional: Negative.    HENT: Positive for congestion.    Eyes: Negative.    Respiratory: Positive for cough and shortness of breath.    Cardiovascular: Positive for leg swelling.   Gastrointestinal: Positive for abdominal pain.        GERD, Ulcerative Esophagitis   Endocrine: Negative.    Genitourinary: Negative.    Musculoskeletal: Positive for back pain (Lower).   Skin: Negative.    Allergic/Immunologic: Negative.    Neurological: Negative.    Hematological: Negative.    Psychiatric/Behavioral: Negative.          ACTIVE PROBLEMS  Patient Active Problem List    Diagnosis    • Mediastinal lymphadenopathy [R59.0]    • DDD (degenerative disc disease), lumbar [M51.36]    • Class 1 obesity  "due to excess calories without serious comorbidity with body mass index (BMI) of 33.0 to 33.9 in adult [E66.09, Z68.33]    • Acute diverticulitis [K57.92]    • CALLY on CPAP [G47.33, Z99.89]    • Ulcerative esophagitis [K22.10]    • Personal history of colonic polyps [Z86.010]    • Gastroesophageal reflux disease [K21.9]    • Rectal bleeding [K62.5]          PAST MEDICAL HISTORY  Past Medical History:   Diagnosis Date   • Anesthesia complication 2023    woke up confused and violent   • Awareness under anesthesia     COLONOSCOPY   • Back pain    • Colon polyp    • DDD (degenerative disc disease), lumbar 2023   • Elevated cholesterol    • Emphysema/COPD    • GERD (gastroesophageal reflux disease)    • Hiatal hernia    • History of heart attack     AGE 33   • History of palpitations    • History of vertigo    • Hoarseness    • Hypertension    • Lung cancer    • CALLY on CPAP     AHI 53/h DOES NOT USE CPAP         SURGICAL HISTORY  Past Surgical History:   Procedure Laterality Date   • BACK SURGERY     • CARDIAC CATHETERIZATION     • COLONOSCOPY     • MEDIASTINOSCOPY N/A 2023    Procedure: MEDIASTINOSCOPY;  Surgeon: Francisco J Shultz MD PhD;  Location: [facility];  Service: Thoracic;  Laterality: N/A;   • ROTATOR CUFF REPAIR Right     X2         FAMILY HISTORY  Family History   Problem Relation Age of Onset   • Crohn's disease Sister    • Colon cancer Neg Hx    • Colon polyps Neg Hx    • Malig Hyperthermia Neg Hx          SOCIAL HISTORY  Social History     Occupational History   • Not on file   Tobacco Use   • Smoking status: Former     Years: 45.00     Types: Cigarettes     Quit date: 2022     Years since quittin.3   • Smokeless tobacco: Former     Types: Chew     Quit date: 2022   Vaping Use   • Vaping Use: Former   • Substances: Nicotine, Flavoring   • Devices: Disposable   • Passive vaping exposure: Yes   Substance and Sexual Activity   • Alcohol use: Not Currently     Comment: \"once in " "a blue moon\"   • Drug use: Not Currently   • Sexual activity: Defer         CURRENT MEDICATIONS    Current Outpatient Medications:   •  amLODIPine (NORVASC) 10 MG tablet, Take 1 tablet by mouth Daily., Disp: , Rfl:   •  atorvastatin (LIPITOR) 20 MG tablet, Take 1 tablet by mouth Every Night., Disp: , Rfl:   •  celecoxib (CeleBREX) 200 MG capsule, Take 1 capsule by mouth Daily. PT HOLDING FOR SURGERY, Disp: , Rfl:   •  Chlorhexidine Gluconate 2 % pads, Apply  topically. As directed pre op, Disp: , Rfl:   •  doxycycline (VIBRAMYCIN) 100 MG capsule, , Disp: , Rfl:   •  DULoxetine (CYMBALTA) 60 MG capsule, Take 1 capsule by mouth Every Evening., Disp: , Rfl:   •  famotidine (PEPCID) 40 MG tablet, Take 1 tablet by mouth Daily., Disp: 90 tablet, Rfl: 3  •  HYDROcodone-acetaminophen (NORCO) 7.5-325 MG per tablet, Take 1 tablet by mouth Every 6 (Six) Hours As Needed., Disp: , Rfl:   •  ibuprofen (ADVIL,MOTRIN) 800 MG tablet, Take 1 tablet by mouth 2 (Two) Times a Day As Needed for Mild Pain or Moderate Pain. PT HOLDING FOR SURGERY, Disp: , Rfl:   •  lisinopril-hydrochlorothiazide (PRINZIDE,ZESTORETIC) 20-25 MG per tablet, Take 1 tablet by mouth Daily., Disp: , Rfl:   •  LORazepam (ATIVAN) 1 MG tablet, Take 1 tablet by mouth 2 (Two) Times a Day., Disp: , Rfl:   •  Multiple Vitamins-Minerals (CENTRUM SILVER PO), Take 1 tablet by mouth Daily. PT HOLDING FOR SURGERY, Disp: , Rfl:   •  omeprazole (priLOSEC) 40 MG capsule, Take 1 capsule by mouth 2 (Two) Times a Day., Disp: 180 capsule, Rfl: 3  •  sucralfate (Carafate) 1 g tablet, Take 1 tablet by mouth 4 (Four) Times a Day., Disp: 120 tablet, Rfl: 3  •  Ventolin  (90 Base) MCG/ACT inhaler, Inhale 2 puffs Every 4 (Four) Hours As Needed., Disp: , Rfl:     ALLERGIES  Patient has no known allergies.    VITALS  Vitals:    05/16/23 1356   BP: 158/86   BP Location: Left arm   Patient Position: Sitting   Cuff Size: Adult   Weight: 105 kg (231 lb 6.4 oz)   Height: 170.2 cm " "(67.01\")       PHYSICAL EXAM  Debilities/Disabilities Identified: None  Emotional Behavior: Appropriate  Wt Readings from Last 3 Encounters:   05/16/23 105 kg (231 lb 6.4 oz)   04/25/23 104 kg (228 lb 4.8 oz)   04/17/23 104 kg (230 lb)     Ht Readings from Last 1 Encounters:   05/16/23 170.2 cm (67.01\")     Body mass index is 36.23 kg/m².  Physical Exam  Constitutional:       General: He is not in acute distress.     Appearance: Normal appearance. He is not ill-appearing.   HENT:      Head: Normocephalic and atraumatic.      Mouth/Throat:      Mouth: Mucous membranes are moist.      Pharynx: No posterior oropharyngeal erythema.   Eyes:      General: No scleral icterus.  Cardiovascular:      Rate and Rhythm: Normal rate and regular rhythm.      Heart sounds: Normal heart sounds.   Pulmonary:      Effort: Pulmonary effort is normal.      Breath sounds: Normal breath sounds.   Abdominal:      General: Abdomen is flat. Bowel sounds are normal. There is no distension.      Palpations: Abdomen is soft. There is no mass.      Tenderness: There is no abdominal tenderness. There is no guarding or rebound. Negative signs include Lanier's sign.      Hernia: No hernia is present.   Musculoskeletal:      Cervical back: Neck supple.   Skin:     General: Skin is warm.      Capillary Refill: Capillary refill takes less than 2 seconds.   Neurological:      General: No focal deficit present.      Mental Status: He is alert and oriented to person, place, and time.   Psychiatric:         Mood and Affect: Mood normal.         Behavior: Behavior normal.         Thought Content: Thought content normal.         Judgment: Judgment normal.         CLINICAL DATA REVIEWED   reviewed previous lab results and integrated with today's visit, reviewed notes from other physicians and/or last GI encounter, reviewed previous endoscopy results and available photos, reviewed surgical pathology results from previous biopsies    ASSESSMENT  Diagnoses and " all orders for this visit:    Gastroesophageal reflux disease with esophagitis without hemorrhage  -     Case Request; Standing  -     Case Request    Personal history of colonic polyps  -     Case Request; Standing  -     Case Request    Other orders  -     doxycycline (VIBRAMYCIN) 100 MG capsule  -     Follow Anesthesia Guidelines / Protocol; Future  -     Obtain Informed Consent; Standing          PLAN    Push colon back until after pulm follow-up  Add EGD    No follow-ups on file.    I have discussed the above plan with the patient.  They verbalize understanding and are in agreement with the plan.  They have been advised to contact the office for any questions, concerns, or changes related to their health.

## 2023-05-17 ENCOUNTER — TELEPHONE (OUTPATIENT)
Dept: GASTROENTEROLOGY | Facility: CLINIC | Age: 68
End: 2023-05-17
Payer: COMMERCIAL

## 2023-05-17 NOTE — TELEPHONE ENCOUNTER
Seen today, 05/16/2023 by FLOR Zheng.  Ordered EGD & Colonoscopy.  Need to cancel his colonoscopy on 06/26/2023.  New order placed for EGD & Colonoscopy.  Patient would like Monday morning.  Explained first Monday would be 11/2023.  Do not want to wait that long.    Scheduled at [place] 08/23/2023 at 10:15am - arrive 9am.  Will mail instructions 2 months prior.

## 2023-05-19 ENCOUNTER — TRANSCRIBE ORDERS (OUTPATIENT)
Dept: ADMINISTRATIVE | Facility: HOSPITAL | Age: 68
End: 2023-05-19
Payer: COMMERCIAL

## 2023-05-19 DIAGNOSIS — M54.50 LUMBAR PAIN: Primary | ICD-10-CM

## 2023-05-22 ENCOUNTER — TELEPHONE (OUTPATIENT)
Dept: PAIN MEDICINE | Facility: HOSPITAL | Age: 68
End: 2023-05-22
Payer: COMMERCIAL

## 2023-05-22 NOTE — TELEPHONE ENCOUNTER
Pt stated 50% improvement and more mobility since procedure. Unsure of duration Pt received instructions on future procedure.

## 2023-06-02 ENCOUNTER — HOSPITAL ENCOUNTER (OUTPATIENT)
Dept: MRI IMAGING | Facility: HOSPITAL | Age: 68
Discharge: HOME | End: 2023-06-02

## 2023-06-02 DIAGNOSIS — M54.50 LOW BACK PAIN, UNSPECIFIED BACK PAIN LATERALITY, UNSPECIFIED CHRONICITY, UNSPECIFIED WHETHER SCIATICA PRESENT: ICD-10-CM

## 2023-06-05 ENCOUNTER — HOSPITAL ENCOUNTER (OUTPATIENT)
Dept: MRI IMAGING | Facility: HOSPITAL | Age: 68
Discharge: HOME | End: 2023-06-05
Admitting: INTERNAL MEDICINE
Payer: MEDICARE

## 2023-06-05 PROCEDURE — 72148 MRI LUMBAR SPINE W/O DYE: CPT

## 2023-06-12 ENCOUNTER — ANESTHESIA EVENT (OUTPATIENT)
Dept: PAIN MEDICINE | Facility: HOSPITAL | Age: 68
End: 2023-06-12
Payer: MEDICARE

## 2023-06-12 ENCOUNTER — HOSPITAL ENCOUNTER (OUTPATIENT)
Dept: PAIN MEDICINE | Facility: HOSPITAL | Age: 68
Discharge: HOME | End: 2023-06-12
Payer: MEDICARE

## 2023-06-12 ENCOUNTER — ANESTHESIA (OUTPATIENT)
Dept: PAIN MEDICINE | Facility: HOSPITAL | Age: 68
End: 2023-06-12
Payer: MEDICARE

## 2023-06-12 ENCOUNTER — HOSPITAL ENCOUNTER (OUTPATIENT)
Dept: GENERAL RADIOLOGY | Facility: HOSPITAL | Age: 68
Discharge: HOME | End: 2023-06-12
Payer: MEDICARE

## 2023-06-12 VITALS
RESPIRATION RATE: 16 BRPM | TEMPERATURE: 97.9 F | HEART RATE: 90 BPM | OXYGEN SATURATION: 96 % | DIASTOLIC BLOOD PRESSURE: 64 MMHG | SYSTOLIC BLOOD PRESSURE: 159 MMHG

## 2023-06-12 DIAGNOSIS — R52 PAIN: ICD-10-CM

## 2023-06-12 DIAGNOSIS — M54.50 LUMBAR PAIN: Primary | ICD-10-CM

## 2023-06-12 PROCEDURE — 25010000002 METHYLPREDNISOLONE PER 40 MG: Performed by: ANESTHESIOLOGY

## 2023-06-12 PROCEDURE — 0 LIDOCAINE 1 % SOLUTION: Performed by: ANESTHESIOLOGY

## 2023-06-12 PROCEDURE — C1755 CATHETER, INTRASPINAL: HCPCS

## 2023-06-12 PROCEDURE — 25510000001 IOPAMIDOL 41 % SOLUTION: Performed by: ANESTHESIOLOGY

## 2023-06-12 PROCEDURE — 77003 FLUOROGUIDE FOR SPINE INJECT: CPT

## 2023-06-12 RX ORDER — METHYLPREDNISOLONE ACETATE 40 MG/ML
80 INJECTION, SUSPENSION INTRA-ARTICULAR; INTRALESIONAL; INTRAMUSCULAR; SOFT TISSUE ONCE
Status: COMPLETED | OUTPATIENT
Start: 2023-06-12 | End: 2023-06-12

## 2023-06-12 RX ORDER — LIDOCAINE HYDROCHLORIDE 10 MG/ML
5 INJECTION, SOLUTION INFILTRATION; PERINEURAL ONCE
Status: COMPLETED | OUTPATIENT
Start: 2023-06-12 | End: 2023-06-12

## 2023-06-12 RX ADMIN — LIDOCAINE HYDROCHLORIDE 5 ML: 10 INJECTION, SOLUTION INFILTRATION; PERINEURAL at 09:20

## 2023-06-12 RX ADMIN — METHYLPREDNISOLONE ACETATE 80 MG: 40 INJECTION, SUSPENSION INTRA-ARTICULAR; INTRALESIONAL; INTRAMUSCULAR; SOFT TISSUE at 09:22

## 2023-06-12 RX ADMIN — IOPAMIDOL 3 ML: 408 INJECTION, SOLUTION INTRATHECAL at 09:21

## 2023-06-12 NOTE — ANESTHESIA PROCEDURE NOTES
PAIN Epidural block    Pre-sedation assessment completed: 6/12/2023 9:17 AM    Patient reassessed immediately prior to procedure    Patient location during procedure: pain clinic  Start Time: 6/12/2023 9:19 AM  Stop Time: 6/12/2023 9:22 AM  Indication:procedure for pain  Performed By  Anesthesiologist: Michelle Escobar MD  Preanesthetic Checklist  Completed: patient identified, risks and benefits discussed, surgical consent, monitors and equipment checked, pre-op evaluation and timeout performed  Prep:  Pt Position:prone  Sterile Tech:cap, gloves, mask and sterile barrier  Prep:chlorhexidine gluconate and isopropyl alcohol  Monitoring:blood pressure monitoring and continuous pulse oximetry  Procedure:Sedation: no     Approach:midline  Guidance: fluoroscopy  Location:lumbar  Level:4-5  Needle Type:Tuohy  Needle Gauge:22 G  Aspiration:negative  Medications:  Preservative Free Saline:5mL  Isovue:1mL  Comments:Skin infiltrated with 1%lidocaine, 1 ml.Depomedrol:80mg  Post Assessment:  Post-procedure: band aid applied.  Pt Tolerance:patient tolerated the procedure well with no apparent complications  Complications:no

## 2023-06-12 NOTE — H&P
SAMUEL Buchanan    History and Physical    Patient Name: Trenton Sears  :  1955  MRN:  [id number]  Date of Admission: 2023    Subjective     Patient is a 67 y.o. male presents with chief complaint of chronic, constant, moderate, severe low back and hips: right pain.  Onset of symptoms was gradual starting 1 year ago.  Symptoms are associated/aggravated by standing for more than 10 minutes, walking for more than 10 minutes, or transitioning out of bed in am . Symptoms improve with relaxation, heating pad, and pain medication .    Narrative & Impression   MRI LUMBAR SPINE WITHOUT CONTRAST.         HISTORY:  Low back pain off and on for over 10 years but worse over the last 2  years. Bilateral hip pain right greater than left. No reported injury.     TECHNIQUE:  Multiplanar multiecho imaging performed of the lumbar spine without  contrast. Initial scan performed 2023 (reported scanner technical  issues) and completed 2023.     FINDINGS:  6 mm retrolisthesis L2 on L3. Extensive endplate marrow edema bordering  the L2-L3 disc space compatible with hyper 1 Modic/degenerative change.  No fracture or aggressive bone lesion. Conus and descending nerve roots  appear normal. Paravertebral soft tissues unremarkable.     L1-L2: Mild circumferential disc bulge. No significant spinal or  foraminal stenosis. Mild facet arthropathy.     L2-L3: Moderate to advanced degenerative disc changes with disc space  narrowing. Circumferential disc bulge with slightly asymmetric right  paracentral and foraminal disc protrusion measuring 18 mm across its  base and about 7 mm AP. Mild to moderate central canal stenosis and  severe right lateral recess and right foraminal stenosis. Moderate to  severe left-sided foraminal stenosis. There may be a small extruded disc  fragment measuring about 10 mm within the right lateral recess.     L3-L4: Mild disc desiccation with circumferential disc bulge. In  combination with facet  hypertrophic change mild-to-moderate central  canal stenosis and moderate bilateral foraminal stenosis.     L4-L5: Disc desiccation with posterior disc space narrowing.  Circumferential disc bulge. In combination with facet arthropathy  moderate central canal stenosis and severe right and mild left foraminal  stenosis.     L5-S1: Normal disc space. No focal disc protrusion. No spinal or  foraminal stenosis. Moderate left-sided facet arthropathy.     IMPRESSION:  Multilevel degenerative disc disease and facet arthropathy  as detailed above. Severe right lateral recess and right-sided foraminal  stenosis L2-L3 due to asymmetric disc protrusion/extrusion with possible  small extruded disc fragment as detailed above. Additional moderate  spinal and foraminal stenosis at L3-L4 and L4-L5 due to a combination of  disc disease and facet arthropathy.     This report was finalized on 6/5/2023 9:21 PM by Dr. EDUARDA Adams MD.   Mr. Sears resents to the anesthesia pain clinic for lumbar epidural steroid injection.  Last lumbar epidural steroid injection was 2/27/2023 and he had 50 to 60% improvement of his pain for 2 months, but his pain did start to return at that time and was back to its original level at well to 13 weeks.  He did have a new MRI as noted above.  He reports he has pain that is sharp and stabbing in his back and an intense ache in his right hip.  His pain is worse with lifting or standing or walking for more than several minutes and transitioning out of bed in the morning.  He has relief with relaxation, reclining back, heating pad, and hydrocodone can help but he only takes this when his pain is really bad.  Pain is a 10 out of 10 at the worst and a 3-4 out of 10 at the least and is constant.  He reports his sleep is interrupted at least a couple times a week.  I discussed performing lumbar epidural steroid injection with the risks including, not limited to, bleeding and infection, postdural puncture  headache, and the fact that I could not guarantee his pain would improve, in fact may worsen, or undergo no change, and he does wish to proceed at this time.  He underwent mediastinoscopy in April 2023.  He reports he has had an ongoing pneumonia type picture, but this is chronic from even before his last lumbar epidural steroid injection.  He denies any fevers and has gone through several rounds of antibiotics.  He denies use of any blood thinners.  He is awake and alert at the time of the exam with normal mood and affect.  Debility/disabilities: None  The following portions of the patients history were reviewed and updated as appropriate: current medications, allergies, past medical history, past surgical history, past family history, past social history, and problem list                Objective     Past Medical History:   Past Medical History:   Diagnosis Date    Anesthesia complication 04/14/2023    woke up confused and violent    Awareness under anesthesia     COLONOSCOPY    Back pain     Colon polyp     DDD (degenerative disc disease), lumbar 02/27/2023    Elevated cholesterol     Emphysema/COPD     GERD (gastroesophageal reflux disease)     Hiatal hernia     History of heart attack     AGE 33    History of palpitations     History of vertigo     Hoarseness     Hypertension     Lung cancer     CALLY on CPAP     AHI 53/h DOES NOT USE CPAP     Past Surgical History:   Past Surgical History:   Procedure Laterality Date    BACK SURGERY      CARDIAC CATHETERIZATION      COLONOSCOPY      MEDIASTINOSCOPY N/A 4/14/2023    Procedure: MEDIASTINOSCOPY;  Surgeon: Francisco J Shultz MD PhD;  Location: [facility];  Service: Thoracic;  Laterality: N/A;    ROTATOR CUFF REPAIR Right     X2     Family History:   Family History   Problem Relation Age of Onset    Crohn's disease Sister     Colon cancer Neg Hx     Colon polyps Neg Hx     Malig Hyperthermia Neg Hx      Social History:   Social History     Socioeconomic History     "Marital status:    Tobacco Use    Smoking status: Some Days     Years: 45.00     Types: Cigarettes     Last attempt to quit: 2022     Years since quittin.4    Smokeless tobacco: Former     Types: Chew     Quit date: 2022   Vaping Use    Vaping Use: Former    Substances: Nicotine, Flavoring    Devices: Disposable    Passive vaping exposure: Yes   Substance and Sexual Activity    Alcohol use: Not Currently     Comment: \"once in a blue moon\"    Drug use: Not Currently    Sexual activity: Defer       Vital Signs Range for the last 24 hours  Temperature: Temp:  [97.9 °F (36.6 °C)] 97.9 °F (36.6 °C)   Temp Source: Temp src: Oral   BP: BP: (130)/(86) 130/86   Pulse: Heart Rate:  [79] 79   Respirations: Resp:  [16] 16   SPO2: SpO2:  [95 %] 95 %   O2 Amount (l/min):     O2 Devices Device (Oxygen Therapy): room air   Weight:           --------------------------------------------------------------------------------    Current Outpatient Medications   Medication Sig Dispense Refill    amLODIPine (NORVASC) 10 MG tablet Take 1 tablet by mouth Daily.      atorvastatin (LIPITOR) 20 MG tablet Take 1 tablet by mouth Every Night.      doxycycline (VIBRAMYCIN) 100 MG capsule       DULoxetine (CYMBALTA) 60 MG capsule Take 1 capsule by mouth Every Evening.      famotidine (PEPCID) 40 MG tablet Take 1 tablet by mouth Daily. 90 tablet 3    HYDROcodone-acetaminophen (NORCO) 7.5-325 MG per tablet Take 1 tablet by mouth Every 6 (Six) Hours As Needed.      ibuprofen (ADVIL,MOTRIN) 800 MG tablet Take 1 tablet by mouth 2 (Two) Times a Day As Needed for Mild Pain or Moderate Pain. PT HOLDING FOR SURGERY      lisinopril-hydrochlorothiazide (PRINZIDE,ZESTORETIC) 20-25 MG per tablet Take 1 tablet by mouth Daily.      LORazepam (ATIVAN) 1 MG tablet Take 1 tablet by mouth 2 (Two) Times a Day.      Multiple Vitamins-Minerals (CENTRUM SILVER PO) Take 1 tablet by mouth Daily. PT HOLDING FOR SURGERY      omeprazole (priLOSEC) 40 MG " capsule Take 1 capsule by mouth 2 (Two) Times a Day. 180 capsule 3    sucralfate (Carafate) 1 g tablet Take 1 tablet by mouth 4 (Four) Times a Day. 120 tablet 3    Ventolin  (90 Base) MCG/ACT inhaler Inhale 2 puffs Every 4 (Four) Hours As Needed.      celecoxib (CeleBREX) 200 MG capsule Take 1 capsule by mouth Daily. PT HOLDING FOR SURGERY       Current Facility-Administered Medications   Medication Dose Route Frequency Provider Last Rate Last Admin    iopamidol (ISOVUE-M 200) injection 41%  3 mL Epidural Once in imaging Michelle Escobar MD        lidocaine (XYLOCAINE) 1 % injection 5 mL  5 mL Infiltration Once Michelle Escobar MD        methylPREDNISolone acetate (DEPO-medrol) injection 80 mg  80 mg Epidural Once Michelle Escobar MD           --------------------------------------------------------------------------------  Assessment & Plan      Anesthesia Evaluation     Patient summary reviewed and Nursing notes reviewed   no history of anesthetic complications:   NPO Solid Status: N/A  NPO Liquid Status: N/A    Pain impairs ability to perform ADLs: Working, Ambulation and Sleeping  Modalities previously tried to control pain with limited effectiveness within the last 4-6 weeks: Rest, Heat, OTC medications and Prescription medications     Airway   Mallampati: II  TM distance: >3 FB  Neck ROM: full  No difficulty expected  Dental - normal exam     Pulmonary    (+) pneumonia improving , a smoker Former, COPD moderate,sleep apnea on CPAP, wheezes (bilateral end expiratory)Lung cancer: biopsies negative.  Cardiovascular   Exercise tolerance: good (4-7 METS)    Rhythm: regular  Rate: normal    (+) hypertension well controlled 2 medications or greaterhyperlipidemia    PE comment: PAC and PVC on monitor    Neuro/Psych  (-) neuromuscular disease  GI/Hepatic/Renal/Endo    (+) obesity, hiatal hernia, GERD well controlled    Musculoskeletal     (+) back pain, radiculopathy  Abdominal   (+) obese   Substance History      OB/GYN           Other   arthritis,                Diagnosis and Plan    Treatment Plan  ASA 3   Patient has had previous injection/procedure with 50-75% improvement.   Procedures: Lumbar Epidural Steroid Injection(LESI),      Anesthetic plan and risks discussed with patient.      Diagnosis     * DDD (degenerative disc disease), lumbar [M51.36]     * Bulge of lumbar disc without myelopathy [M51.36]     * Spinal stenosis [M48.00]

## 2023-07-25 ENCOUNTER — HOSPITAL ENCOUNTER (OUTPATIENT)
Dept: CT IMAGING | Facility: HOSPITAL | Age: 68
Discharge: HOME | End: 2023-07-25
Admitting: STUDENT IN AN ORGANIZED HEALTH CARE EDUCATION/TRAINING PROGRAM
Payer: MEDICARE

## 2023-07-25 DIAGNOSIS — R91.8 LUNG MASS: ICD-10-CM

## 2023-07-25 PROCEDURE — 71250 CT THORAX DX C-: CPT

## 2023-08-02 ENCOUNTER — OFFICE VISIT (OUTPATIENT)
Dept: SURGERY | Facility: CLINIC | Age: 68
End: 2023-08-02
Payer: MEDICARE

## 2023-08-02 VITALS
DIASTOLIC BLOOD PRESSURE: 96 MMHG | HEART RATE: 92 BPM | WEIGHT: 220 LBS | HEIGHT: 67 IN | OXYGEN SATURATION: 95 % | SYSTOLIC BLOOD PRESSURE: 160 MMHG | BODY MASS INDEX: 34.53 KG/M2

## 2023-08-02 DIAGNOSIS — F17.200 SMOKING ADDICTION: ICD-10-CM

## 2023-08-02 DIAGNOSIS — R59.0 MEDIASTINAL LYMPHADENOPATHY: Primary | ICD-10-CM

## 2023-08-02 PROCEDURE — 99213 OFFICE O/P EST LOW 20 MIN: CPT | Performed by: STUDENT IN AN ORGANIZED HEALTH CARE EDUCATION/TRAINING PROGRAM

## 2023-08-02 RX ORDER — TRAMADOL HYDROCHLORIDE 50 MG/1
TABLET ORAL
COMMUNITY

## 2023-08-02 RX ORDER — MECLIZINE HYDROCHLORIDE 25 MG/1
TABLET ORAL
COMMUNITY

## 2023-08-09 ENCOUNTER — TRANSCRIBE ORDERS (OUTPATIENT)
Dept: ADMINISTRATIVE | Facility: HOSPITAL | Age: 68
End: 2023-08-09
Payer: COMMERCIAL

## 2023-08-09 DIAGNOSIS — J45.909 ASTHMATIC BRONCHITIS, UNSPECIFIED ASTHMA SEVERITY, UNCOMPLICATED: Primary | ICD-10-CM

## 2023-08-16 ENCOUNTER — TELEPHONE (OUTPATIENT)
Dept: OTHER | Facility: HOSPITAL | Age: 68
End: 2023-08-16
Payer: COMMERCIAL

## 2023-08-16 PROBLEM — F17.200 TOBACCO USE DISORDER: Status: ACTIVE | Noted: 2023-08-16

## 2023-08-16 PROBLEM — R91.8 LUNG MASS: Status: ACTIVE | Noted: 2023-08-16

## 2023-08-16 NOTE — TELEPHONE ENCOUNTER
[facility] MULTIDISCIPLINARY CLINIC  SURVIVORSHIP SERVICES CARE COORDINATION NOTE  PHONE      Call placed to patient  RE: missed appointment for smoking cessation visit.    Spoke with  patient    Patient states he will not be able to make it in for our scheduled smoking cessation appointment today. He states he has back surgery upcoming, unsure of how long he will be recovering, and is not ready to reschedule an evaluation at this time.   Encouraged the patient to consider rescheduling when able. Will provide my contact info so that he may call my office directly. We also discussed the [facility] quitline service at 1-800-QUIT-NOW. He is open to receiving materials in the mail.    Patient mailed name and contact information for CHARITY Faust DNP, smoking cessation program information as well as [place] Quit Line information (1-800-QUIT-NOW).    Patient encouraged to call the office at any point for additional information, resources or support.

## 2023-08-21 ENCOUNTER — HOSPITAL ENCOUNTER (OUTPATIENT)
Dept: PULMONOLOGY | Facility: HOSPITAL | Age: 68
Discharge: HOME | End: 2023-08-21
Admitting: INTERNAL MEDICINE
Payer: MEDICARE

## 2023-08-21 DIAGNOSIS — J45.909 ASTHMATIC BRONCHITIS, UNSPECIFIED ASTHMA SEVERITY, UNCOMPLICATED: ICD-10-CM

## 2023-08-21 PROCEDURE — 94060 EVALUATION OF WHEEZING: CPT

## 2023-08-21 RX ORDER — ALBUTEROL SULFATE 2.5 MG/3ML
2.5 SOLUTION RESPIRATORY (INHALATION) ONCE
Status: COMPLETED | OUTPATIENT
Start: 2023-08-21 | End: 2023-08-21

## 2023-08-21 RX ADMIN — ALBUTEROL SULFATE 2.5 MG: 2.5 SOLUTION RESPIRATORY (INHALATION) at 13:43

## 2023-08-22 ENCOUNTER — TELEPHONE (OUTPATIENT)
Dept: GASTROENTEROLOGY | Facility: CLINIC | Age: 68
End: 2023-08-22
Payer: COMMERCIAL

## 2023-08-22 NOTE — TELEPHONE ENCOUNTER
Called patient about his EGD & Colonoscopy tomorrow, 08/23/2023.  Need to move his arrive time to 7:30am.    He states feels uncomfortable to have this procedures.  Still have issues with my breathing.  Had breathing test yesterday at Dr. Duenas.  I failed breathing test last week.  Explained best option would be to cancel for tomorrow and once cleared from Dr. Duenas, will reschedule.  He is agreeable to that.

## 2023-08-24 ENCOUNTER — TRANSCRIBE ORDERS (OUTPATIENT)
Dept: ADMINISTRATIVE | Facility: HOSPITAL | Age: 68
End: 2023-08-24
Payer: COMMERCIAL

## 2023-08-24 DIAGNOSIS — R91.1 LUNG NODULE: Primary | ICD-10-CM

## 2023-10-24 DIAGNOSIS — K21.00 GASTROESOPHAGEAL REFLUX DISEASE WITH ESOPHAGITIS WITHOUT HEMORRHAGE: ICD-10-CM

## 2023-10-24 DIAGNOSIS — K22.10 ULCERATIVE ESOPHAGITIS: ICD-10-CM

## 2023-10-24 RX ORDER — SUCRALFATE 1 G/1
1 TABLET ORAL 4 TIMES DAILY
Qty: 120 TABLET | Refills: 3 | Status: SHIPPED | OUTPATIENT
Start: 2023-10-24

## 2023-11-06 ENCOUNTER — HOSPITAL ENCOUNTER (OUTPATIENT)
Dept: CT IMAGING | Facility: HOSPITAL | Age: 68
Discharge: HOME | End: 2023-11-06
Admitting: INTERNAL MEDICINE
Payer: MEDICARE

## 2023-11-06 DIAGNOSIS — R91.1 LUNG NODULE: ICD-10-CM

## 2023-11-06 PROCEDURE — 71250 CT THORAX DX C-: CPT

## 2023-11-21 ENCOUNTER — TRANSCRIBE ORDERS (OUTPATIENT)
Dept: PULMONOLOGY | Facility: HOSPITAL | Age: 68
End: 2023-11-21
Payer: COMMERCIAL

## 2023-11-21 ENCOUNTER — TRANSCRIBE ORDERS (OUTPATIENT)
Dept: ADMINISTRATIVE | Facility: HOSPITAL | Age: 68
End: 2023-11-21
Payer: COMMERCIAL

## 2023-11-21 DIAGNOSIS — R91.8 LUNG NODULES: Primary | ICD-10-CM

## 2023-12-14 DIAGNOSIS — K21.00 GASTROESOPHAGEAL REFLUX DISEASE WITH ESOPHAGITIS WITHOUT HEMORRHAGE: ICD-10-CM

## 2023-12-14 RX ORDER — FAMOTIDINE 40 MG/1
40 TABLET, FILM COATED ORAL DAILY
Qty: 90 TABLET | Refills: 3 | OUTPATIENT
Start: 2023-12-14

## 2024-01-26 DIAGNOSIS — K21.00 GASTROESOPHAGEAL REFLUX DISEASE WITH ESOPHAGITIS WITHOUT HEMORRHAGE: ICD-10-CM

## 2024-01-26 DIAGNOSIS — K22.10 ULCERATIVE ESOPHAGITIS: ICD-10-CM

## 2024-01-26 RX ORDER — OMEPRAZOLE 40 MG/1
40 CAPSULE, DELAYED RELEASE ORAL 2 TIMES DAILY
Qty: 180 CAPSULE | Refills: 0 | Status: SHIPPED | OUTPATIENT
Start: 2024-01-26

## 2024-02-06 ENCOUNTER — OFFICE VISIT (OUTPATIENT)
Dept: SURGERY | Facility: CLINIC | Age: 69
End: 2024-02-06
Payer: MEDICARE

## 2024-02-06 VITALS
HEART RATE: 88 BPM | DIASTOLIC BLOOD PRESSURE: 102 MMHG | BODY MASS INDEX: 34.53 KG/M2 | OXYGEN SATURATION: 97 % | WEIGHT: 220 LBS | SYSTOLIC BLOOD PRESSURE: 168 MMHG | HEIGHT: 67 IN

## 2024-02-06 DIAGNOSIS — F17.200 SMOKING ADDICTION: ICD-10-CM

## 2024-02-06 DIAGNOSIS — R59.0 MEDIASTINAL LYMPHADENOPATHY: Primary | ICD-10-CM

## 2024-02-06 PROCEDURE — 99213 OFFICE O/P EST LOW 20 MIN: CPT | Performed by: STUDENT IN AN ORGANIZED HEALTH CARE EDUCATION/TRAINING PROGRAM

## 2024-02-06 RX ORDER — FLUTICASONE FUROATE, UMECLIDINIUM BROMIDE AND VILANTEROL TRIFENATATE 100; 62.5; 25 UG/1; UG/1; UG/1
POWDER RESPIRATORY (INHALATION)
COMMUNITY
Start: 2024-02-01

## 2024-02-06 NOTE — PROGRESS NOTES
"Chief Complaint  Mediastinal lymphadenopathy, pulmonary nodule, history of cigarette and vape use     Subjective        Trenton Sears presents to [facility] THORACIC SURGERY  History of Present Illness  Mr. Sears is a pleasant 67-year-old gentleman who presents today for a 6-month follow-up in regards to his mediastinal lymphadenopathy and right lower lobe pulmonary nodule.  He previously underwent cervical meddiastinoscopy and CT-guided biopsy of his right lower lobe nodule.  Neither of these biopsy showed signs of malignancy.  He continues to smoke although the amount has reduced any roughly smokes around 1 pack/day.  He denies any other symptoms.  Objective   Vital Signs:  BP (!) 168/102 (BP Location: Left arm, Patient Position: Sitting, Cuff Size: Adult)   Pulse 88   Ht 170.2 cm (67.01\")   Wt 99.8 kg (220 lb)   SpO2 97%   BMI 34.45 kg/m²   Estimated body mass index is 34.45 kg/m² as calculated from the following:    Height as of this encounter: 170.2 cm (67.01\").    Weight as of this encounter: 99.8 kg (220 lb).         Physical Exam  Constitutional:       Appearance: Normal appearance.   Cardiovascular:      Rate and Rhythm: Normal rate and regular rhythm.      Pulses: Normal pulses.      Heart sounds: Normal heart sounds.   Pulmonary:      Effort: Pulmonary effort is normal.      Breath sounds: Normal breath sounds.   Neurological:      Mental Status: He is alert.        Result Review :    CT scan performed in November 2023 was visualized and reviewed by myself.  Agree with the findings.           Assessment and Plan     Diagnoses and all orders for this visit:    1. Mediastinal lymphadenopathy (Primary)    2. Smoking addiction  -     Ambulatory Referral to Multi-Disciplinary Clinic    Mr. Sears is a pleasant 68-year-old gentleman who presents today for an interval 6-month follow-up due to his mediastinal lymphadenopathy and right lower lobe pulmonary nodule.  These have been stable in " size and shape over approximately the last year.  His most recent CT scan of the chest was back in November 2023 and  this scan was stable.  He is scheduled to get a CT scan of the chest in November 2024 ordered by pulmonology.  I am happy to see him back in clinic around that time to review his imaging.  Based on negative biopsies and stability over several CT scans, I think it is safe to wait until November for continued surveillance.  We will see him back once his CT scan is performed and this upcoming November.       I spent 20 minutes caring for Trenton on this date of service. This time includes time spent by me in the following activities:preparing for the visit, reviewing tests, obtaining and/or reviewing a separately obtained history, performing a medically appropriate examination and/or evaluation , counseling and educating the patient/family/caregiver, ordering medications, tests, or procedures, referring and communicating with other health care professionals , documenting information in the medical record, independently interpreting results and communicating that information with the patient/family/caregiver, and care coordination  Follow Up     No follow-ups on file.  Patient was given instructions and counseling regarding his condition or for health maintenance advice. Please see specific information pulled into the AVS if appropriate.

## 2024-02-15 ENCOUNTER — TELEPHONE (OUTPATIENT)
Dept: OTHER | Facility: HOSPITAL | Age: 69
End: 2024-02-15
Payer: COMMERCIAL

## 2024-04-26 DIAGNOSIS — K22.10 ULCERATIVE ESOPHAGITIS: ICD-10-CM

## 2024-04-26 DIAGNOSIS — K21.00 GASTROESOPHAGEAL REFLUX DISEASE WITH ESOPHAGITIS WITHOUT HEMORRHAGE: ICD-10-CM

## 2024-04-26 RX ORDER — OMEPRAZOLE 40 MG/1
40 CAPSULE, DELAYED RELEASE ORAL 2 TIMES DAILY
Qty: 180 CAPSULE | Refills: 0 | Status: SHIPPED | OUTPATIENT
Start: 2024-04-26

## 2024-07-23 DIAGNOSIS — K21.00 GASTROESOPHAGEAL REFLUX DISEASE WITH ESOPHAGITIS WITHOUT HEMORRHAGE: ICD-10-CM

## 2024-07-23 DIAGNOSIS — K22.10 ULCERATIVE ESOPHAGITIS: ICD-10-CM

## 2024-07-23 RX ORDER — OMEPRAZOLE 40 MG/1
40 CAPSULE, DELAYED RELEASE ORAL 2 TIMES DAILY
Qty: 180 CAPSULE | Refills: 0 | OUTPATIENT
Start: 2024-07-23

## 2024-07-30 ENCOUNTER — TELEPHONE (OUTPATIENT)
Dept: SURGERY | Facility: CLINIC | Age: 69
End: 2024-07-30
Payer: COMMERCIAL

## 2024-08-07 DIAGNOSIS — K22.10 ULCERATIVE ESOPHAGITIS: ICD-10-CM

## 2024-08-07 DIAGNOSIS — K21.00 GASTROESOPHAGEAL REFLUX DISEASE WITH ESOPHAGITIS WITHOUT HEMORRHAGE: ICD-10-CM

## 2024-08-08 RX ORDER — OMEPRAZOLE 40 MG/1
40 CAPSULE, DELAYED RELEASE ORAL 2 TIMES DAILY
Qty: 180 CAPSULE | Refills: 0 | OUTPATIENT
Start: 2024-08-08

## 2024-11-11 ENCOUNTER — HOSPITAL ENCOUNTER (OUTPATIENT)
Dept: CT IMAGING | Facility: HOSPITAL | Age: 69
Discharge: HOME | End: 2024-11-11
Admitting: INTERNAL MEDICINE
Payer: MEDICARE

## 2024-11-11 DIAGNOSIS — R91.8 LUNG NODULES: ICD-10-CM

## 2024-11-11 PROCEDURE — 71250 CT THORAX DX C-: CPT

## 2024-12-17 ENCOUNTER — OFFICE VISIT (OUTPATIENT)
Dept: GASTROENTEROLOGY | Facility: CLINIC | Age: 69
End: 2024-12-17
Payer: MEDICARE

## 2024-12-17 ENCOUNTER — LAB (OUTPATIENT)
Dept: LAB | Facility: HOSPITAL | Age: 69
End: 2024-12-17
Payer: MEDICARE

## 2024-12-17 VITALS
HEIGHT: 67 IN | DIASTOLIC BLOOD PRESSURE: 90 MMHG | BODY MASS INDEX: 34.18 KG/M2 | WEIGHT: 217.8 LBS | SYSTOLIC BLOOD PRESSURE: 148 MMHG

## 2024-12-17 DIAGNOSIS — Z12.11 ENCOUNTER FOR SCREENING FOR MALIGNANT NEOPLASM OF COLON: ICD-10-CM

## 2024-12-17 DIAGNOSIS — K21.00 GASTROESOPHAGEAL REFLUX DISEASE WITH ESOPHAGITIS WITHOUT HEMORRHAGE: ICD-10-CM

## 2024-12-17 DIAGNOSIS — D64.9 ANEMIA, UNSPECIFIED TYPE: Primary | ICD-10-CM

## 2024-12-17 DIAGNOSIS — K22.10 ULCERATIVE ESOPHAGITIS: ICD-10-CM

## 2024-12-17 DIAGNOSIS — K22.10 EROSIVE ESOPHAGITIS: ICD-10-CM

## 2024-12-17 LAB
ANISOCYTOSIS BLD QL: ABNORMAL
BASOPHILS # BLD MANUAL: 0.17 10*3/MM3 (ref 0–0.2)
BASOPHILS NFR BLD MANUAL: 2.1 % (ref 0–1.5)
DEPRECATED RDW RBC AUTO: 52.1 FL (ref 37–54)
EOSINOPHIL # BLD MANUAL: 0.26 10*3/MM3 (ref 0–0.4)
EOSINOPHIL NFR BLD MANUAL: 3.1 % (ref 0.3–6.2)
ERYTHROCYTE [DISTWIDTH] IN BLOOD BY AUTOMATED COUNT: 20.4 % (ref 12.3–15.4)
FERRITIN SERPL-MCNC: 14.8 NG/ML (ref 30–400)
FOLATE SERPL-MCNC: >20 NG/ML (ref 4.78–24.2)
HCT VFR BLD AUTO: 42.8 % (ref 37.5–51)
HGB BLD-MCNC: 13.6 G/DL (ref 13–17.7)
IRON 24H UR-MRATE: 64 MCG/DL (ref 59–158)
IRON SATN MFR SERPL: 11 % (ref 20–50)
LYMPHOCYTES # BLD MANUAL: 2.92 10*3/MM3 (ref 0.7–3.1)
LYMPHOCYTES NFR BLD MANUAL: 2.1 % (ref 5–12)
MCH RBC QN AUTO: 23.7 PG (ref 26.6–33)
MCHC RBC AUTO-ENTMCNC: 31.8 G/DL (ref 31.5–35.7)
MCV RBC AUTO: 74.4 FL (ref 79–97)
MICROCYTES BLD QL: ABNORMAL
MONOCYTES # BLD: 0.17 10*3/MM3 (ref 0.1–0.9)
NEUTROPHILS # BLD AUTO: 4.73 10*3/MM3 (ref 1.7–7)
NEUTROPHILS NFR BLD MANUAL: 57.3 % (ref 42.7–76)
NRBC BLD AUTO-RTO: 0 /100 WBC (ref 0–0.2)
PLAT MORPH BLD: NORMAL
PLATELET # BLD AUTO: 359 10*3/MM3 (ref 140–450)
PMV BLD AUTO: 9.9 FL (ref 6–12)
POIKILOCYTOSIS BLD QL SMEAR: ABNORMAL
RBC # BLD AUTO: 5.75 10*6/MM3 (ref 4.14–5.8)
TIBC SERPL-MCNC: 574 MCG/DL (ref 298–536)
TRANSFERRIN SERPL-MCNC: 385 MG/DL (ref 200–360)
VARIANT LYMPHS NFR BLD MANUAL: 10.4 % (ref 0–5)
VARIANT LYMPHS NFR BLD MANUAL: 25 % (ref 19.6–45.3)
VIT B12 BLD-MCNC: 529 PG/ML (ref 211–946)
WBC MORPH BLD: NORMAL
WBC NRBC COR # BLD AUTO: 8.25 10*3/MM3 (ref 3.4–10.8)

## 2024-12-17 PROCEDURE — 84466 ASSAY OF TRANSFERRIN: CPT

## 2024-12-17 PROCEDURE — 85025 COMPLETE CBC W/AUTO DIFF WBC: CPT

## 2024-12-17 PROCEDURE — 85007 BL SMEAR W/DIFF WBC COUNT: CPT

## 2024-12-17 PROCEDURE — 83540 ASSAY OF IRON: CPT

## 2024-12-17 PROCEDURE — 82607 VITAMIN B-12: CPT

## 2024-12-17 PROCEDURE — 36415 COLL VENOUS BLD VENIPUNCTURE: CPT

## 2024-12-17 PROCEDURE — 82746 ASSAY OF FOLIC ACID SERUM: CPT

## 2024-12-17 PROCEDURE — 82728 ASSAY OF FERRITIN: CPT

## 2024-12-17 RX ORDER — GABAPENTIN 300 MG/1
300 CAPSULE ORAL
COMMUNITY

## 2024-12-17 RX ORDER — HYDROCHLOROTHIAZIDE 25 MG/1
TABLET ORAL
COMMUNITY
Start: 2024-10-30

## 2024-12-17 RX ORDER — POTASSIUM CHLORIDE 1500 MG/1
TABLET, EXTENDED RELEASE ORAL
COMMUNITY
Start: 2024-12-12

## 2024-12-17 RX ORDER — AMLODIPINE BESYLATE 10 MG/1
1 TABLET ORAL DAILY
COMMUNITY

## 2024-12-17 RX ORDER — OMEPRAZOLE 40 MG/1
40 CAPSULE, DELAYED RELEASE ORAL 2 TIMES DAILY
Qty: 180 CAPSULE | Refills: 1 | Status: SHIPPED | OUTPATIENT
Start: 2024-12-17

## 2024-12-17 NOTE — PROGRESS NOTES
PATIENT INFORMATION  Trenton Sears       - 1955    CHIEF COMPLAINT  Chief Complaint   Patient presents with    Follow-up     GERD:     Constipation       HISTORY OF PRESENT ILLNESS  Here today today for GERD follow-up     GERD: Has been out of omeprazole refills for awhile, feels ok, taking sucralfate a few times, not feeling HB or indigestion other than occasionally after sweets. No dysphagia, bloating, discomfort. Taking a lot of ibuprofen for chronic back pain, managed by Dr. Hester.     Daily BM, usually easy to pass and feels complete. No bleeding or rectal pain. Not straining or bleeding. Taking sennakot, 1 a day, bowels move daily when taking, if not taking will skip a day or 2. Stools are only hard if skipping a day or 2. 4 hydrocodone a day.     Now with anemia, no iron studies, no iron supplement, no black or tarry stools. No PICA. Denies bleeding, no dizziness or SOA. Occasional dizziness when changing position.     A lot of sinus drainage    EGD 10/21/2019 with normal duodenum, reactive gastritis, ulcerative esophagitis, negative for Barretts or HP. Had laryngospasm after EGD, so planned colon was cancelled at that time. Cancelled colon scheduled 2022, due to inadequate prep, was vomiting with prep, was unaware of mag citrate replacement. Need colon performed at hospital.     Constipation      REVIEWED PERTINENT RESULTS/ LABS  Lab Results   Component Value Date    CASEREPORT  2023     Surgical Pathology Report                         Case: [id number]                                  Authorizing Provider:  Francisco J Shultz MD PhD   Collected:           2023 08:25 AM          Ordering Location:     [facility]  Received:            2023 08:49 AM                                 CT                                                                           Pathologist:           Eileen Anaya MD                                                          Specimen:     Lung, ct guided biopsy right lower lung nodule                                             FINALDX  04/17/2023     1. Lung, Right Lower Lobe, Core Biopsy:     A. Organizing pneumonia.   B. No dysplasia nor malignancy.   C. No granulomata, diagnostic viral inclusions nor fungal organisms identified.    MEC/clm       Lab Results   Component Value Date    HGB 12.5 (L) 04/11/2023    MCV 74.1 (L) 04/11/2023     04/17/2023    ALT 36 08/18/2021    AST 21 08/18/2021    INR 1.0 08/29/2023      No results found.    REVIEW OF SYSTEMS  Review of Systems   Constitutional: Negative.    HENT: Negative.     Eyes: Negative.    Respiratory: Negative.     Cardiovascular: Negative.    Gastrointestinal:  Positive for constipation.   Endocrine: Negative.    Genitourinary: Negative.    Musculoskeletal: Negative.    Skin: Negative.    Allergic/Immunologic: Negative.    Neurological: Negative.    Hematological: Negative.    Psychiatric/Behavioral: Negative.           ACTIVE PROBLEMS  Patient Active Problem List    Diagnosis     Encounter for screening for malignant neoplasm of colon [Z12.11]     Erosive esophagitis [K22.10]     Lung mass [R91.8]     Tobacco use disorder [F17.200]     Mediastinal lymphadenopathy [R59.0]     DDD (degenerative disc disease), lumbar [M51.369]     Class 1 obesity due to excess calories without serious comorbidity with body mass index (BMI) of 33.0 to 33.9 in adult [E66.811, E66.09, Z68.33]     Acute diverticulitis [K57.92]     CALLY on CPAP [G47.33]     Ulcerative esophagitis [K22.10]     Personal history of colonic polyps [Z86.0100]     Gastroesophageal reflux disease [K21.9]     Rectal bleeding [K62.5]          PAST MEDICAL HISTORY  Past Medical History:   Diagnosis Date    Anesthesia complication 04/14/2023    woke up confused and violent    Awareness under anesthesia     COLONOSCOPY    Back pain     Colon polyp     DDD (degenerative disc disease), lumbar 02/27/2023    Elevated cholesterol      "Emphysema/COPD     GERD (gastroesophageal reflux disease)     Hiatal hernia     History of heart attack     AGE 33    History of palpitations     History of vertigo     Hoarseness     Hypertension     Lung cancer     CALLY on CPAP     AHI 53/h DOES NOT USE CPAP         SURGICAL HISTORY  Past Surgical History:   Procedure Laterality Date    BACK SURGERY      CARDIAC CATHETERIZATION      COLONOSCOPY      MEDIASTINOSCOPY N/A 2023    Procedure: MEDIASTINOSCOPY;  Surgeon: Francisco J Shultz MD PhD;  Location: [facility] MAIN OR;  Service: Thoracic;  Laterality: N/A;    ROTATOR CUFF REPAIR Right     X2         FAMILY HISTORY  Family History   Problem Relation Age of Onset    Crohn's disease Sister     Colon cancer Neg Hx     Colon polyps Neg Hx     Malig Hyperthermia Neg Hx          SOCIAL HISTORY  Social History     Occupational History    Not on file   Tobacco Use    Smoking status: Some Days     Current packs/day: 0.00     Types: Cigarettes     Start date: 1977     Last attempt to quit: 2022     Years since quittin.9    Smokeless tobacco: Former     Types: Chew     Quit date: 2022   Vaping Use    Vaping status: Former    Substances: Nicotine, Flavoring    Devices: Disposable    Passive vaping exposure: Yes   Substance and Sexual Activity    Alcohol use: Not Currently     Comment: \"once in a blue moon\"    Drug use: Not Currently    Sexual activity: Defer         CURRENT MEDICATIONS    Current Outpatient Medications:     amLODIPine (NORVASC) 10 MG tablet, Take 1 tablet by mouth Daily., Disp: , Rfl:     atorvastatin (LIPITOR) 20 MG tablet, Take 1 tablet by mouth Every Night., Disp: , Rfl:     celecoxib (CeleBREX) 200 MG capsule, Take 1 capsule by mouth Daily. PT HOLDING FOR SURGERY, Disp: , Rfl:     DULoxetine (CYMBALTA) 60 MG capsule, Take 1 capsule by mouth Every Evening., Disp: , Rfl:     famotidine (PEPCID) 40 MG tablet, Take 1 tablet by mouth Daily. (Patient taking differently: Take 1 tablet by mouth " "Daily. Taking PRN), Disp: 90 tablet, Rfl: 3    gabapentin (NEURONTIN) 300 MG capsule, 1 capsule., Disp: , Rfl:     hydroCHLOROthiazide 25 MG tablet, , Disp: , Rfl:     HYDROcodone-acetaminophen (NORCO) 7.5-325 MG per tablet, Take 1 tablet by mouth Every 6 (Six) Hours As Needed., Disp: , Rfl:     ibuprofen (ADVIL,MOTRIN) 800 MG tablet, Take 1 tablet by mouth 2 (Two) Times a Day As Needed for Mild Pain or Moderate Pain. PT HOLDING FOR SURGERY, Disp: , Rfl:     lisinopril-hydrochlorothiazide (PRINZIDE,ZESTORETIC) 20-25 MG per tablet, Take 1 tablet by mouth Daily., Disp: , Rfl:     LORazepam (ATIVAN) 1 MG tablet, Take 1 tablet by mouth 2 (Two) Times a Day., Disp: , Rfl:     meclizine (ANTIVERT) 25 MG tablet, Take 1 tablet 3 times a day by oral route as needed., Disp: , Rfl:     Multiple Vitamins-Minerals (CENTRUM SILVER PO), Take 1 tablet by mouth Daily. PT HOLDING FOR SURGERY, Disp: , Rfl:     omeprazole (priLOSEC) 40 MG capsule, Take 1 capsule by mouth 2 (Two) Times a Day., Disp: 180 capsule, Rfl: 1    potassium chloride ER (K-TAB) 20 MEQ tablet controlled-release ER tablet, , Disp: , Rfl:     sucralfate (CARAFATE) 1 g tablet, TAKE ONE TABLET BY MOUTH FOUR TIMES A DAY, Disp: 120 tablet, Rfl: 3    Ventolin  (90 Base) MCG/ACT inhaler, Inhale 2 puffs Every 4 (Four) Hours As Needed., Disp: , Rfl:     Naloxegol Oxalate (Movantik) 25 MG tablet, Take 1 tablet by mouth Every Morning., Disp: 30 tablet, Rfl: 3    Trelegy Ellipta 100-62.5-25 MCG/ACT inhaler, , Disp: , Rfl:     ALLERGIES  Patient has no known allergies.    VITALS  Vitals:    12/17/24 1014   BP: 148/90   BP Location: Left arm   Patient Position: Sitting   Weight: 98.8 kg (217 lb 12.8 oz)   Height: 170.2 cm (67.01\")       PHYSICAL EXAM  Debilities/Disabilities Identified: None  Emotional Behavior: Appropriate  Wt Readings from Last 3 Encounters:   12/17/24 98.8 kg (217 lb 12.8 oz)   02/06/24 99.8 kg (220 lb)   08/02/23 99.8 kg (220 lb)     Ht Readings from " "Last 1 Encounters:   12/17/24 170.2 cm (67.01\")     Body mass index is 34.1 kg/m².  Physical Exam  Constitutional:       General: He is not in acute distress.     Appearance: Normal appearance. He is not ill-appearing.   HENT:      Head: Normocephalic and atraumatic.      Mouth/Throat:      Mouth: Mucous membranes are moist.      Pharynx: No posterior oropharyngeal erythema.   Eyes:      General: No scleral icterus.  Cardiovascular:      Rate and Rhythm: Normal rate and regular rhythm.      Heart sounds: Normal heart sounds.   Pulmonary:      Effort: Pulmonary effort is normal.      Breath sounds: Normal breath sounds.      Comments: RLL Coarse  Abdominal:      General: Abdomen is flat. Bowel sounds are normal. There is no distension.      Palpations: Abdomen is soft. There is no mass.      Tenderness: There is no abdominal tenderness. There is no guarding or rebound. Negative signs include Lanier's sign.      Hernia: No hernia is present.   Musculoskeletal:      Cervical back: Neck supple.   Skin:     General: Skin is warm.      Capillary Refill: Capillary refill takes less than 2 seconds.   Neurological:      General: No focal deficit present.      Mental Status: He is alert and oriented to person, place, and time.   Psychiatric:         Mood and Affect: Mood normal.         Behavior: Behavior normal.         Thought Content: Thought content normal.         Judgment: Judgment normal.         CLINICAL DATA REVIEWED   reviewed previous lab results and integrated with today's visit, reviewed notes from other physicians and/or last GI encounter, reviewed previous endoscopy results and available photos, reviewed surgical pathology results from previous biopsies    ASSESSMENT  Diagnoses and all orders for this visit:    Anemia, unspecified type  -     CBC & Differential  -     Iron Profile  -     Ferritin  -     Vitamin B12  -     Folate    Encounter for screening for malignant neoplasm of colon  -     Case Request; " Standing  -     Case Request    Erosive esophagitis  -     Case Request; Standing  -     Case Request    Gastroesophageal reflux disease with esophagitis without hemorrhage  -     omeprazole (priLOSEC) 40 MG capsule; Take 1 capsule by mouth 2 (Two) Times a Day.    Ulcerative esophagitis  -     omeprazole (priLOSEC) 40 MG capsule; Take 1 capsule by mouth 2 (Two) Times a Day.    Other orders  -     amLODIPine (NORVASC) 10 MG tablet; Take 1 tablet by mouth Daily.  -     gabapentin (NEURONTIN) 300 MG capsule; 1 capsule.  -     potassium chloride ER (K-TAB) 20 MEQ tablet controlled-release ER tablet  -     hydroCHLOROthiazide 25 MG tablet  -     Follow Anesthesia Guidelines / Protocol; Future  -     Naloxegol Oxalate (Movantik) 25 MG tablet; Take 1 tablet by mouth Every Morning.          PLAN    GERD: Resume PPI BID AC  OIC: Start movantik, drink plenty of fluids  Anemia: Labs today  EGD and Colon with start     Return in about 3 months (around 3/17/2025).    I have discussed the above plan with the patient.  They verbalize understanding and are in agreement with the plan.  They have been advised to contact the office for any questions, concerns, or changes related to their health.

## 2025-02-25 ENCOUNTER — ANESTHESIA EVENT (OUTPATIENT)
Dept: PERIOP | Facility: HOSPITAL | Age: 70
End: 2025-02-25
Payer: MEDICARE

## 2025-02-26 ENCOUNTER — ANESTHESIA (OUTPATIENT)
Dept: PERIOP | Facility: HOSPITAL | Age: 70
End: 2025-02-26
Payer: MEDICARE

## 2025-02-26 ENCOUNTER — HOSPITAL ENCOUNTER (OUTPATIENT)
Facility: HOSPITAL | Age: 70
Setting detail: HOSPITAL OUTPATIENT SURGERY
Discharge: HOME | End: 2025-02-26
Attending: INTERNAL MEDICINE | Admitting: INTERNAL MEDICINE
Payer: MEDICARE

## 2025-02-26 VITALS
SYSTOLIC BLOOD PRESSURE: 170 MMHG | HEART RATE: 69 BPM | DIASTOLIC BLOOD PRESSURE: 104 MMHG | OXYGEN SATURATION: 92 % | TEMPERATURE: 98 F | WEIGHT: 216 LBS | BODY MASS INDEX: 33.82 KG/M2 | RESPIRATION RATE: 16 BRPM

## 2025-02-26 DIAGNOSIS — K22.10 EROSIVE ESOPHAGITIS: ICD-10-CM

## 2025-02-26 DIAGNOSIS — Z12.11 ENCOUNTER FOR SCREENING FOR MALIGNANT NEOPLASM OF COLON: ICD-10-CM

## 2025-02-26 PROCEDURE — 25010000002 PROPOFOL 200 MG/20ML EMULSION: Performed by: NURSE ANESTHETIST, CERTIFIED REGISTERED

## 2025-02-26 PROCEDURE — 25010000002 LIDOCAINE 2% SOLUTION: Performed by: NURSE ANESTHETIST, CERTIFIED REGISTERED

## 2025-02-26 PROCEDURE — 88305 TISSUE EXAM BY PATHOLOGIST: CPT | Performed by: INTERNAL MEDICINE

## 2025-02-26 RX ORDER — DIPHENHYDRAMINE HYDROCHLORIDE 50 MG/ML
12.5 INJECTION INTRAMUSCULAR; INTRAVENOUS
Status: DISCONTINUED | OUTPATIENT
Start: 2025-02-26 | End: 2025-02-26 | Stop reason: HOSPADM

## 2025-02-26 RX ORDER — LIDOCAINE HYDROCHLORIDE 20 MG/ML
INJECTION, SOLUTION INFILTRATION; PERINEURAL AS NEEDED
Status: DISCONTINUED | OUTPATIENT
Start: 2025-02-26 | End: 2025-02-26 | Stop reason: SURG

## 2025-02-26 RX ORDER — SODIUM CHLORIDE 0.9 % (FLUSH) 0.9 %
10 SYRINGE (ML) INJECTION AS NEEDED
Status: DISCONTINUED | OUTPATIENT
Start: 2025-02-26 | End: 2025-02-26 | Stop reason: HOSPADM

## 2025-02-26 RX ORDER — ONDANSETRON 2 MG/ML
4 INJECTION INTRAMUSCULAR; INTRAVENOUS ONCE AS NEEDED
Status: DISCONTINUED | OUTPATIENT
Start: 2025-02-26 | End: 2025-02-26 | Stop reason: HOSPADM

## 2025-02-26 RX ORDER — SODIUM CHLORIDE 9 MG/ML
40 INJECTION, SOLUTION INTRAVENOUS AS NEEDED
Status: DISCONTINUED | OUTPATIENT
Start: 2025-02-26 | End: 2025-02-26 | Stop reason: HOSPADM

## 2025-02-26 RX ORDER — PROPOFOL 10 MG/ML
INJECTION, EMULSION INTRAVENOUS AS NEEDED
Status: DISCONTINUED | OUTPATIENT
Start: 2025-02-26 | End: 2025-02-26 | Stop reason: SURG

## 2025-02-26 RX ORDER — SODIUM CHLORIDE, SODIUM LACTATE, POTASSIUM CHLORIDE, CALCIUM CHLORIDE 600; 310; 30; 20 MG/100ML; MG/100ML; MG/100ML; MG/100ML
9 INJECTION, SOLUTION INTRAVENOUS CONTINUOUS
Status: DISCONTINUED | OUTPATIENT
Start: 2025-02-26 | End: 2025-02-26 | Stop reason: HOSPADM

## 2025-02-26 RX ORDER — LIDOCAINE HYDROCHLORIDE 10 MG/ML
0.5 INJECTION, SOLUTION EPIDURAL; INFILTRATION; INTRACAUDAL; PERINEURAL ONCE AS NEEDED
Status: DISCONTINUED | OUTPATIENT
Start: 2025-02-26 | End: 2025-02-26 | Stop reason: HOSPADM

## 2025-02-26 RX ORDER — SODIUM CHLORIDE 0.9 % (FLUSH) 0.9 %
10 SYRINGE (ML) INJECTION EVERY 12 HOURS SCHEDULED
Status: DISCONTINUED | OUTPATIENT
Start: 2025-02-26 | End: 2025-02-26 | Stop reason: HOSPADM

## 2025-02-26 RX ADMIN — PROPOFOL 50 MG: 10 INJECTION, EMULSION INTRAVENOUS at 09:10

## 2025-02-26 RX ADMIN — PROPOFOL 50 MG: 10 INJECTION, EMULSION INTRAVENOUS at 09:05

## 2025-02-26 RX ADMIN — PROPOFOL 50 MG: 10 INJECTION, EMULSION INTRAVENOUS at 08:55

## 2025-02-26 RX ADMIN — PROPOFOL 100 MG: 10 INJECTION, EMULSION INTRAVENOUS at 08:50

## 2025-02-26 RX ADMIN — PROPOFOL 100 MG: 10 INJECTION, EMULSION INTRAVENOUS at 09:00

## 2025-02-26 RX ADMIN — LIDOCAINE HYDROCHLORIDE 80 MG: 20 INJECTION, SOLUTION INFILTRATION; PERINEURAL at 08:44

## 2025-02-26 RX ADMIN — PROPOFOL 100 MG: 10 INJECTION, EMULSION INTRAVENOUS at 08:44

## 2025-02-26 NOTE — H&P
"Patient Care Team:  Amaury Wynn MD as PCP - General  Amaury Wynn MD as PCP - Family Medicine    CHIEF COMPLAINT: Screening CRC and Ulcerative Esophagitis    HISTORY OF PRESENT ILLNESS:  Failed Colon 2019, Poor Prep in 2022- Third times a charm    Past Medical History:   Diagnosis Date    Anesthesia complication 04/14/2023    woke up confused and violent    Awareness under anesthesia     COLONOSCOPY    Back pain     Colon polyp     DDD (degenerative disc disease), lumbar 02/27/2023    Elevated cholesterol     Emphysema/COPD     GERD (gastroesophageal reflux disease)     Hiatal hernia     History of heart attack     AGE 33    History of palpitations     History of vertigo     Hoarseness     Hypertension     Lung cancer     pt denies    CALLY on CPAP     AHI 53/h DOES NOT USE CPAP     Past Surgical History:   Procedure Laterality Date    BACK SURGERY      CARDIAC CATHETERIZATION      COLONOSCOPY      ENDOSCOPY      pt needed ambu bag stim  when endoscop removed    MEDIASTINOSCOPY N/A 04/14/2023    Procedure: MEDIASTINOSCOPY;  Surgeon: Francisco J Shultz MD PhD;  Location: [facility];  Service: Thoracic;  Laterality: N/A;    ROTATOR CUFF REPAIR Right     X2     Family History   Problem Relation Age of Onset    Crohn's disease Sister     Colon cancer Neg Hx     Colon polyps Neg Hx     Malig Hyperthermia Neg Hx      Social History     Tobacco Use    Smoking status: Some Days     Current packs/day: 0.00     Types: Cigarettes     Start date: 1/1/1977     Last attempt to quit: 1/1/2022     Years since quitting: 3.1    Smokeless tobacco: Former     Types: Chew     Quit date: 1/1/2022   Vaping Use    Vaping status: Former    Substances: Nicotine, Flavoring    Devices: Disposable    Passive vaping exposure: Yes   Substance Use Topics    Alcohol use: Not Currently     Comment: \"once in a blue moon\"    Drug use: Not Currently     Types: Marijuana     Comment: not recently   long time ago     Medications Prior to " Admission   Medication Sig Dispense Refill Last Dose/Taking    amLODIPine (NORVASC) 10 MG tablet Take 1 tablet by mouth Daily.   2/25/2025 Morning    atorvastatin (LIPITOR) 20 MG tablet Take 1 tablet by mouth Every Night.   2/25/2025    DULoxetine (CYMBALTA) 60 MG capsule Take 1 capsule by mouth Every Evening.   2/24/2025 Evening    gabapentin (NEURONTIN) 300 MG capsule 1 capsule.   2/26/2025 at  4:00 AM    HYDROcodone-acetaminophen (NORCO) 7.5-325 MG per tablet Take 1 tablet by mouth Every 6 (Six) Hours As Needed.   2/26/2025 at  4:00 AM    lisinopril-hydrochlorothiazide (PRINZIDE,ZESTORETIC) 20-25 MG per tablet Take 1 tablet by mouth Daily.   2/25/2025 Morning    LORazepam (ATIVAN) 1 MG tablet Take 1 tablet by mouth 2 (Two) Times a Day.   2/26/2025 at  4:00 AM    Naloxegol Oxalate (Movantik) 25 MG tablet Take 1 tablet by mouth Every Morning. 30 tablet 3 2/24/2025 Morning    omeprazole (priLOSEC) 40 MG capsule Take 1 capsule by mouth 2 (Two) Times a Day. 180 capsule 1 2/25/2025 Morning    potassium chloride ER (K-TAB) 20 MEQ tablet controlled-release ER tablet    2/25/2025 Morning    sucralfate (CARAFATE) 1 g tablet TAKE ONE TABLET BY MOUTH FOUR TIMES A  tablet 3 2/25/2025 Morning    Ventolin  (90 Base) MCG/ACT inhaler Inhale 2 puffs Every 4 (Four) Hours As Needed.   Past Week    celecoxib (CeleBREX) 200 MG capsule Take 1 capsule by mouth Daily. PT HOLDING FOR SURGERY   2/18/2025    famotidine (PEPCID) 40 MG tablet Take 1 tablet by mouth Daily. (Patient taking differently: Take 1 tablet by mouth Daily. Taking PRN) 90 tablet 3 Unknown    hydroCHLOROthiazide 25 MG tablet    Unknown    ibuprofen (ADVIL,MOTRIN) 800 MG tablet Take 1 tablet by mouth 2 (Two) Times a Day As Needed for Mild Pain or Moderate Pain. PT HOLDING FOR SURGERY   2/18/2025    meclizine (ANTIVERT) 25 MG tablet Take 1 tablet 3 times a day by oral route as needed.   Unknown    Multiple Vitamins-Minerals (CENTRUM SILVER PO) Take 1 tablet  by mouth Daily. PT HOLDING FOR SURGERY   More than a month     Allergies:  Patient has no known allergies.    REVIEW OF SYSTEMS:  Please see the above history of present illness for pertinent positives and negatives.  The remainder of the patient's systems have been reviewed and are negative.     Vital Signs  Temp:  [98.3 °F (36.8 °C)] 98.3 °F (36.8 °C)  Heart Rate:  [76] 76  Resp:  [15] 15  BP: (154)/(96) 154/96    Flowsheet Rows      Flowsheet Row First Filed Value   Admission Height --   Admission Weight 98 kg (216 lb) Documented at 02/26/2025 0732             Physical Exam:  Physical Exam   Constitutional: Patient appears well-developed and well-nourished and in no acute distress   HEENT:   Head: Normocephalic and atraumatic.   Eyes:  Pupils are equal, round, and reactive to light. EOM are intact. Sclerae are anicteric and non-injected.  Mouth and Throat: Patient has moist mucous membranes. Oropharynx is clear of any erythema or exudate.     Neck: Neck supple. No JVD present. No thyromegaly present. No lymphadenopathy present.  Cardiovascular: Regular rate, regular rhythm, S1 normal and S2 normal.  Exam reveals no gallop and no friction rub.  No murmur heard.  Pulmonary/Chest: Lungs are clear to auscultation bilaterally. No respiratory distress. No wheezes. No rhonchi. No rales.   Abdominal: Soft. Bowel sounds are normal. No distension and no mass. There is no hepatosplenomegaly. There is no tenderness.   Musculoskeletal: Normal Muscle tone  Extremities: No edema. Pulses are palpable in all 4 extremities.  Neurological: Patient is alert and oriented to person, place, and time. Cranial nerves II-XII are grossly intact with no focal deficits.  Skin: Skin is warm. No rash noted. Nails show no clubbing.  No cyanosis or erythema.    Debilities/Disabilities Identified: None  Emotional Behavior: Appropriate     Results Review:   I reviewed the patient's new clinical results.    Lab Results (most recent)       None             Imaging Results (Most Recent)       None          reviewed    ECG/EMG Results (most recent)       None          reviewed    Assessment & Plan   Screening CRC and Ulcerative Esophagitis /  EGD and colonoscopy      I discussed the patient's findings and my recommendations with patient.     Roberto Shine MD  02/26/25  08:33 EST    Time: 10 min prior to procedure.

## 2025-02-26 NOTE — ANESTHESIA PREPROCEDURE EVALUATION
Anesthesia Evaluation     Patient summary reviewed and Nursing notes reviewed   no history of anesthetic complications:   NPO Solid Status: > 8 hours  NPO Liquid Status: > 8 hours           Airway   Mallampati: II  TM distance: >3 FB  Neck ROM: full  No difficulty expected  Dental - normal exam     Pulmonary - normal exam    breath sounds clear to auscultation  (+) a smoker Current, Smoked day of surgery, cigarettes, COPD moderate,sleep apnea  (-) lung cancer  Cardiovascular - normal exam  Exercise tolerance: good (4-7 METS)    Rhythm: regular  Rate: normal    (+) hypertension well controlled less than 2 medications, hyperlipidemia      Neuro/Psych  GI/Hepatic/Renal/Endo    (+) obesity, hiatal hernia, GERD well controlled, PUD, GI bleeding lower resolved  (-) morbid obesity    Musculoskeletal     (+) back pain  Abdominal   (+) obese   Substance History   (+) alcohol use     OB/GYN negative ob/gyn ROS         Other   arthritis,     (-) history of cancer                    Anesthesia Plan    ASA 3     MAC     intravenous induction     Anesthetic plan, risks, benefits, and alternatives have been provided, discussed and informed consent has been obtained with: patient.  Pre-procedure education provided  Use of blood products discussed with patient  Consented to blood products.        CODE STATUS:

## 2025-02-26 NOTE — BRIEF OP NOTE
ESOPHAGOGASTRODUODENOSCOPY WITH BIOPSY, COLONOSCOPY WITH POLYPECTOMY  Progress Note    Trenton Sears  2/26/2025    Pre-op Diagnosis:   Encounter for screening for malignant neoplasm of colon [Z12.11]  Erosive esophagitis [K22.10]       Post-Op Diagnosis Codes:     * Encounter for screening for malignant neoplasm of colon [Z12.11]     * Erosive esophagitis [K22.10]     * Erosive gastritis [K29.60]     * Hiatal hernia [K44.9]     * Reflux esophagitis [K21.00]     * Diverticulosis [K57.90]     * Colon polyps [K63.5]    Procedure(s):      Procedure(s):  ESOPHAGOGASTRODUODENOSCOPY WITH BIOPSY  COLONOSCOPY WITH POLYPECTOMY              Surgeon(s):  Roberto Shine MD    Anesthesia: Monitored Anesthesia Care    Staff:   Circulator: Jann Zarate RN  Scrub Person: Gurvinder Howard       Estimated Blood Loss: none    Urine Voided: * No values recorded between 2/26/2025  8:39 AM and 2/26/2025  9:24 AM *    Specimens:                Specimens       ID Source Type Tests Collected By Collected At Frozen?    A Gastric, Antrum Tissue TISSUE PATHOLOGY EXAM   Roberto Shine MD 2/26/25 0849 No    This specimen was not marked as sent.    B Esophagus, Distal Tissue TISSUE PATHOLOGY EXAM   Roberto Shine MD 2/26/25 0849 No    This specimen was not marked as sent.    C Large Intestine, Right / Ascending Colon Polyp TISSUE PATHOLOGY EXAM   Roberto Shine MD 2/26/25 0905 No    This specimen was not marked as sent.    D Large Intestine, Transverse Colon Polyp TISSUE PATHOLOGY EXAM   Roberto Shine MD 2/26/25 0907 No    This specimen was not marked as sent.    E Large Intestine, Sigmoid Colon Polyp TISSUE PATHOLOGY EXAM   Roberto Shine MD 2/26/25 0911 No    Description: x2    This specimen was not marked as sent.              Drains: * No LDAs found *    Findings: Erosive gastritis-biopsy  Normal duodenum  Hiatal Hernia  Reflux Esophagitis-biopsy    Colon to  Cecum Good Prep  Sigmoid Diverticulosis  [id number]      Complications: None          Roberto Shine MD     Date: 2/26/2025  Time: 09:25 EST

## 2025-02-26 NOTE — ANESTHESIA POSTPROCEDURE EVALUATION
Patient: Trenton Sears    Procedure Summary       Date: 02/26/25 Room / Location: [facility] ENDOSCOPY 1 /  [facility]    Anesthesia Start: 0839 Anesthesia Stop: 0918    Procedures:       ESOPHAGOGASTRODUODENOSCOPY WITH BIOPSY (Esophagus)      COLONOSCOPY WITH POLYPECTOMY Diagnosis:       Encounter for screening for malignant neoplasm of colon      Erosive esophagitis      Erosive gastritis      Hiatal hernia      Reflux esophagitis      Diverticulosis      Colon polyps      (Encounter for screening for malignant neoplasm of colon [Z12.11])      (Erosive esophagitis [K22.10])    Surgeons: Roberto Shine MD Provider: Hubert Vuong CRNA    Anesthesia Type: MAC ASA Status: 3            Anesthesia Type: MAC    Vitals  Vitals Value Taken Time   /99 02/26/25 0940   Temp 98 °F (36.7 °C) 02/26/25 0926   Pulse 79 02/26/25 0940   Resp 20 02/26/25 0940   SpO2 95 % 02/26/25 0940   Vitals shown include unfiled device data.        Post Anesthesia Care and Evaluation    Patient location during evaluation: PHASE II  Patient participation: complete - patient participated  Level of consciousness: awake and alert  Pain score: 0  Pain management: adequate    Airway patency: patent  Anesthetic complications: No anesthetic complications  PONV Status: none  Cardiovascular status: acceptable  Respiratory status: acceptable  Hydration status: acceptable     Patient is a 70y old  Male who presents with a chief complaint of Abdominal pain and scrotal pain, Incarcerated R Inguino-scrotal Hernia. (2018 09:15)  s/p repair of rt inguinosrotal hernia with cecotomy and mesh placement  Intubated. . alert follows commands.   chest x-ray shows collapse of rt uppper lobe. et tube and gastric tube in place.  INTERVAL HPI/OVERNIGHT EVENTS:  PAST MEDICAL & SURGICAL HISTORY:  Sudden cardiac death  Gait abnormality  Leg pain, right  Anoxic encephalopathy  Brain injury with coma: x 2 weeks in   Prostate cancer: radiation therapy  Hypertension  S/P ICD (internal cardiac defibrillator) procedure: implanted on 2009  H/O tracheostomy  H/O prostate cancer: resection and radiation therapy  Status post cryoablation: of left renal mass      MEDICATIONS  (STANDING):  ALBUTerol/ipratropium for Nebulization 3 milliLiter(s) Nebulizer every 6 hours  ALBUTerol/ipratropium for Nebulization. 3 milliLiter(s) Nebulizer once  chlorhexidine 0.12% Liquid 15 milliLiter(s) Oral Mucosa two times a day  ciprofloxacin   IVPB 400 milliGRAM(s) IV Intermittent every 12 hours  heparin  Injectable 5000 Unit(s) SubCutaneous every 8 hours  metroNIDAZOLE  IVPB 500 milliGRAM(s) IV Intermittent every 8 hours  pantoprazole  Injectable 40 milliGRAM(s) IV Push daily  sodium chloride 0.9%. 1000 milliLiter(s) (100 mL/Hr) IV Continuous <Continuous>    MEDICATIONS  (PRN):      Allergies    lisinopril (Unknown)  penicillins (Unknown)    Intolerances        REVIEW OF SYSTEMS:  CONSTITUTIONAL: No fever, weight loss, or fatigue  EYES: No eye pain, visual disturbances, or discharge  ENMT:  No difficulty hearing, tinnitus, vertigo; No sinus or throat pain  NECK: No pain or stiffness  BREASTS: No pain, masses, or nipple discharge  RESPIRATORY: No cough, wheezing, chills or hemoptysis; No shortness of breath  CARDIOVASCULAR: No chest pain, palpitations, dizziness, or leg swelling  GASTROINTESTINAL: No abdominal or epigastric pain. No nausea, vomiting, or hematemesis; No diarrhea or constipation. No melena or hematochezia.  GENITOURINARY: No dysuria, frequency, hematuria, or incontinence  NEUROLOGICAL: No headaches, memory loss, loss of strength, numbness, or tremors  SKIN: No itching, burning, rashes, or lesions   LYMPH NODES: No enlarged glands  ENDOCRINE: No heat or cold intolerance; No hair loss  MUSCULOSKELETAL: No joint pain or swelling; No muscle, back, or extremity pain  PSYCHIATRIC: No depression, anxiety, mood swings, or difficulty sleeping  HEME/LYMPH: No easy bruising, or bleeding gums  ALLERY AND IMMUNOLOGIC: No hives or eczema    Vital Signs Last 24 Hrs  T(C): 37.6 (2018 08:20), Max: 37.6 (2018 01:18)  T(F): 99.7 (2018 08:20), Max: 99.7 (2018 08:20)  HR: 108 (2018 09:00) (96 - 108)  BP: 141/95 (2018 09:00) (96/69 - 186/99)  BP(mean): 108 (2018 09:00) (76 - 108)  RR: 16 (2018 09:00) (12 - 20)  SpO2: 100% (2018 09:00) (95% - 100%)    PHYSICAL EXAM:  GENERAL: NAD, well-groomed, well-developed  HEAD:  Atraumatic, Normocephalic  EYES: EOMI, PERRLA, conjunctiva and sclera clear  ENMT: No tonsillar erythema, exudates, or enlargement; Moist mucous membranes, Good dentition, No lesions  NECK: Supple, No JVD, Normal thyroid  NERVOUS SYSTEM:  Alert & Oriented X3, Good concentration; Motor Strength 5/5 B/L upper and lower extremities; DTRs 2+ intact and symmetric  CHEST/LUNG: Clear to percussion bilaterally; No rales, rhonchi, wheezing, or rubs. ET tube in place  HEART: Regular rate and rhythm; No murmurs, rubs, or gallops  ABDOMEN: Soft, Nontender, Nondistended; Bowel sounds present  EXTREMITIES:  2+ Peripheral Pulses, No clubbing, cyanosis, or edema  LYMPH: No lymphadenopathy noted  SKIN: No rashes or lesions    LABS:                        11.6   8.75  )-----------( 319      ( 2018 03:44 )             34.3         135  |  100  |  24<H>  ----------------------------<  182<H>  5.2   |  27  |  1.26    Ca    6.9<L>      2018 03:44  Phos  2.8       Mg     2.4         TPro  5.4<L>  /  Alb  2.2<L>  /  TBili  1.6<H>  /  DBili  x   /  AST  44<H>  /  ALT  20  /  AlkPhos  54        PT/INR - ( 2018 14:15 )   PT: 14.3 sec;   INR: 1.27 ratio         PTT - ( 2018 14:15 )  PTT:24.9 sec  Urinalysis Basic - ( 2018 14:16 )    Color: Yellow / Appearance: Clear / S.005 / pH: x  Gluc: x / Ketone: Negative  / Bili: Negative / Urobili: 1 mg/dL   Blood: x / Protein: 30 mg/dL / Nitrite: Negative   Leuk Esterase: Trace / RBC: 3-5 /HPF / WBC 3-5   Sq Epi: x / Non Sq Epi: Occasional / Bacteria: Few      CAPILLARY BLOOD GLUCOSE        ABG - ( 2018 02:41 )  pH, Arterial: x     pH, Blood: 7.46  /  pCO2: 33    /  pO2: 148   / HCO3: 23    / Base Excess: 0.0   /  SaO2: 98                CARDIAC MARKERS ( 2018 05:40 )  <.015 ng/mL / x     / x     / x     / 2.7 ng/mL            RADIOLOGY & ADDITIONAL TESTS:    Imaging Personally Reviewed:  [ ] YES  [ ] NO    Consultant(s) Notes Reviewed:  [x ] YES  [ ] NO    Care Discussed with Consultants/Other Providers [x ] YES  [ ] NO    Care discussed with family,         [  ]   yes  [  ]  No    imp:    stable[ ]    unstable[  ]     improving [  x ]       unchanged  [  ]                Plans:  Continue present plans  [ x ] as per critical care team               New consult [  ]   specialty  .......               order test[  ]    test name.                  Discharge Planning  [  ]

## 2025-02-27 LAB
CYTO UR: NORMAL
LAB AP CASE REPORT: NORMAL
PATH REPORT.FINAL DX SPEC: NORMAL
PATH REPORT.GROSS SPEC: NORMAL

## 2025-05-01 ENCOUNTER — OFFICE VISIT (OUTPATIENT)
Dept: GASTROENTEROLOGY | Facility: CLINIC | Age: 70
End: 2025-05-01
Payer: MEDICARE

## 2025-05-01 VITALS
HEIGHT: 67 IN | WEIGHT: 222.8 LBS | BODY MASS INDEX: 34.97 KG/M2 | SYSTOLIC BLOOD PRESSURE: 150 MMHG | DIASTOLIC BLOOD PRESSURE: 90 MMHG

## 2025-05-01 DIAGNOSIS — K59.03 THERAPEUTIC OPIOID-INDUCED CONSTIPATION (OIC): ICD-10-CM

## 2025-05-01 DIAGNOSIS — K29.60 CHEMICAL GASTRITIS: Primary | ICD-10-CM

## 2025-05-01 DIAGNOSIS — K21.9 GASTROESOPHAGEAL REFLUX DISEASE WITHOUT ESOPHAGITIS: ICD-10-CM

## 2025-05-01 DIAGNOSIS — T40.2X5A THERAPEUTIC OPIOID-INDUCED CONSTIPATION (OIC): ICD-10-CM

## 2025-05-01 DIAGNOSIS — Z86.0101 PERSONAL HISTORY OF ADENOMATOUS AND SERRATED COLON POLYPS: ICD-10-CM

## 2025-05-01 NOTE — PROGRESS NOTES
PATIENT INFORMATION  Trenton Sears       - 1955    CHIEF COMPLAINT  Chief Complaint   Patient presents with    Follow-up     GERD: Anemia        HISTORY OF PRESENT ILLNESS  Here today for EGD follow-up    2025 EGD for ulcerative esophagitis, positive for healed reflux esophagitis, with erosive gastritis, negative for HP, barretts.  25 Colonoscopy with 2/4 adenomas, 7 yr recall  Reviewed path and images with patient.    GERD: At LOV had been off PPI for awhile, was feeling ok, taking some sucralfate. Frequent ibuprofen for back pain.  Taking ibuprofen 3 times a week, encouraged to avoid. Taking only when cannot handle pain.   He is compliant with BID PPI and taking sucralfate once a day.     Bowels were managed on 1 sennakot daily on 4 hydrocodone daily, movantik ordered. Was unable to get movantik because too expensive, over $500. Not taking anything to make bowels move, bowels are moving every day, encouraged to use miralax as needed, but right now doing well.    EGD 10/21/2019 with normal duodenum, reactive gastritis, ulcerative esophagitis, negative for Barretts or HP. Had laryngospasm after EGD, so planned colon was cancelled at that time. Cancelled colon scheduled 2022, due to inadequate prep, was vomiting with prep, was unaware of mag citrate replacement. Need colon performed at hospital.       REVIEWED PERTINENT RESULTS/ LABS  Lab Results   Component Value Date    CASEREPORT  2025     Surgical Pathology Report                         Case: [id number]                                  Authorizing Provider:  Roberto Shine        Collected:           2025 08:49 AM                                 MD hCino                                                                   Ordering Location:     [facility]   Received:            2025 09:30 AM                                 OR                                                                            Pathologist:           oRselia Teran MD                                                    Specimens:   1) - Gastric, Antrum                                                                                2) - Esophagus, Distal                                                                              3) - Large Intestine, Right / Ascending Colon                                                       4) - Large Intestine, Transverse Colon                                                              5) - Large Intestine, Sigmoid Colon, x2                                                    FINALDX  02/26/2025     1.  Stomach, antrum, biopsy:   -Erosive gastropathy and repair.   -Negative for Helicobacter on routine staining.   -No metaplasia nor dysplasia identified.    2.  Distal esophagus, biopsy:   -Reactive squamous and glandular mucosa.   -No eosinophilia, viral inclusions nor fungal forms identified on routine staining.   -Negative for goblet cell metaplasia and dysplasia.    3.  Ascending colon, biopsy:   -Tubular adenoma.   -Negative for high-grade dysplasia.    4.  Transverse colon, biopsy:   -Tubular adenoma.   -Negative for high-grade dysplasia.    5.  Sigmoid colon, biopsy:   -Hyperplastic polyp.    [facility]       Lab Results   Component Value Date    HGB 13.6 12/17/2024    MCV 74.4 (L) 12/17/2024     12/17/2024    ALT 36 08/18/2021    AST 21 08/18/2021    INR 1.0 08/29/2023    FERRITIN 14.80 (L) 12/17/2024    IRON 64 12/17/2024    TIBC 574 (H) 12/17/2024      No results found.    REVIEW OF SYSTEMS  Review of Systems   Constitutional: Negative.    HENT: Negative.     Eyes: Negative.    Respiratory: Negative.     Cardiovascular: Negative.    Gastrointestinal: Negative.    Endocrine: Negative.    Genitourinary: Negative.    Musculoskeletal: Negative.    Skin: Negative.    Allergic/Immunologic: Negative.    Neurological: Negative.    Hematological: Negative.    Psychiatric/Behavioral: Negative.            ACTIVE PROBLEMS  Patient Active Problem List    Diagnosis     Encounter for screening for malignant neoplasm of colon [Z12.11]     Erosive esophagitis [K22.10]     Lung mass [R91.8]     Tobacco use disorder [F17.200]     Mediastinal lymphadenopathy [R59.0]     DDD (degenerative disc disease), lumbar [M51.369]     Class 1 obesity due to excess calories without serious comorbidity with body mass index (BMI) of 33.0 to 33.9 in adult [E66.811, E66.09, Z68.33]     Acute diverticulitis [K57.92]     CALLY on CPAP [G47.33]     Ulcerative esophagitis [K22.10]     Personal history of colonic polyps [Z86.0100]     Gastroesophageal reflux disease [K21.9]     Rectal bleeding [K62.5]          PAST MEDICAL HISTORY  Past Medical History:   Diagnosis Date    Anesthesia complication 04/14/2023    woke up confused and violent    Awareness under anesthesia     COLONOSCOPY    Back pain     Colon polyp     DDD (degenerative disc disease), lumbar 02/27/2023    Elevated cholesterol     Emphysema/COPD     GERD (gastroesophageal reflux disease)     Hiatal hernia     History of heart attack     AGE 33    History of palpitations     History of vertigo     Hoarseness     Hypertension     Lung cancer     pt denies    CALLY on CPAP     AHI 53/h DOES NOT USE CPAP         SURGICAL HISTORY  Past Surgical History:   Procedure Laterality Date    BACK SURGERY      CARDIAC CATHETERIZATION      COLONOSCOPY      COLONOSCOPY W/ POLYPECTOMY N/A 2/26/2025    Procedure: COLONOSCOPY WITH POLYPECTOMY;  Surgeon: Roberto Shine MD;  Location: [facility] OR;  Service: Gastroenterology;  Laterality: N/A;  ascending colon polyp x1, transverse polyp x1, sigmoid polyp x2    ENDOSCOPY      pt needed ambu bag stim  when endoscop removed    ENDOSCOPY N/A 2/26/2025    Procedure: ESOPHAGOGASTRODUODENOSCOPY WITH BIOPSY;  Surgeon: Roberto Shine MD;  Location: [facility] OR;  Service: Gastroenterology;  Laterality: N/A;  gastric antrum, distal esophagus  "biopsies    MEDIASTINOSCOPY N/A 04/14/2023    Procedure: MEDIASTINOSCOPY;  Surgeon: Francisco J Shultz MD PhD;  Location: [facility] MAIN OR;  Service: Thoracic;  Laterality: N/A;    ROTATOR CUFF REPAIR Right     X2         FAMILY HISTORY  Family History   Problem Relation Age of Onset    Crohn's disease Sister     Colon cancer Neg Hx     Colon polyps Neg Hx     Malig Hyperthermia Neg Hx          SOCIAL HISTORY  Social History     Occupational History    Not on file   Tobacco Use    Smoking status: Some Days     Current packs/day: 0.00     Types: Cigarettes     Start date: 1/1/1977     Last attempt to quit: 1/1/2022     Years since quitting: 3.3    Smokeless tobacco: Former     Types: Chew     Quit date: 1/1/2022   Vaping Use    Vaping status: Former    Substances: Nicotine, Flavoring    Devices: Disposable    Passive vaping exposure: Yes   Substance and Sexual Activity    Alcohol use: Not Currently     Comment: \"once in a blue moon\"    Drug use: Not Currently     Types: Marijuana     Comment: not recently   long time ago    Sexual activity: Defer         CURRENT MEDICATIONS    Current Outpatient Medications:     amLODIPine (NORVASC) 10 MG tablet, Take 1 tablet by mouth Daily., Disp: , Rfl:     atorvastatin (LIPITOR) 20 MG tablet, Take 1 tablet by mouth Every Night., Disp: , Rfl:     celecoxib (CeleBREX) 200 MG capsule, Take 1 capsule by mouth Daily. PT HOLDING FOR SURGERY, Disp: , Rfl:     DULoxetine (CYMBALTA) 60 MG capsule, Take 1 capsule by mouth Every Evening., Disp: , Rfl:     famotidine (PEPCID) 40 MG tablet, Take 1 tablet by mouth Daily., Disp: 90 tablet, Rfl: 3    gabapentin (NEURONTIN) 300 MG capsule, 1 capsule., Disp: , Rfl:     HYDROcodone-acetaminophen (NORCO) 7.5-325 MG per tablet, Take 1 tablet by mouth Every 6 (Six) Hours As Needed., Disp: , Rfl:     ibuprofen (ADVIL,MOTRIN) 800 MG tablet, Take 1 tablet by mouth 2 (Two) Times a Day As Needed for Mild Pain or Moderate Pain. PT HOLDING FOR SURGERY, Disp: , " "Rfl:     lisinopril-hydrochlorothiazide (PRINZIDE,ZESTORETIC) 20-25 MG per tablet, Take 1 tablet by mouth Daily., Disp: , Rfl:     LORazepam (ATIVAN) 1 MG tablet, Take 1 tablet by mouth 2 (Two) Times a Day., Disp: , Rfl:     meclizine (ANTIVERT) 25 MG tablet, Take 1 tablet 3 times a day by oral route as needed., Disp: , Rfl:     Multiple Vitamins-Minerals (CENTRUM SILVER PO), Take 1 tablet by mouth Daily. PT HOLDING FOR SURGERY, Disp: , Rfl:     omeprazole (priLOSEC) 40 MG capsule, Take 1 capsule by mouth 2 (Two) Times a Day., Disp: 180 capsule, Rfl: 1    potassium chloride ER (K-TAB) 20 MEQ tablet controlled-release ER tablet, , Disp: , Rfl:     sucralfate (CARAFATE) 1 g tablet, TAKE ONE TABLET BY MOUTH FOUR TIMES A DAY, Disp: 120 tablet, Rfl: 3    Ventolin  (90 Base) MCG/ACT inhaler, Inhale 2 puffs Every 4 (Four) Hours As Needed., Disp: , Rfl:     ALLERGIES  Patient has no known allergies.    VITALS  Vitals:    05/01/25 1356   BP: 150/90   BP Location: Left arm   Patient Position: Sitting   Cuff Size: Adult   Weight: 101 kg (222 lb 12.8 oz)   Height: 170.2 cm (67.01\")       PHYSICAL EXAM  Debilities/Disabilities Identified: None  Emotional Behavior: Appropriate  Wt Readings from Last 3 Encounters:   05/01/25 101 kg (222 lb 12.8 oz)   02/26/25 98 kg (216 lb)   12/17/24 98.8 kg (217 lb 12.8 oz)     Ht Readings from Last 1 Encounters:   05/01/25 170.2 cm (67.01\")     Body mass index is 34.89 kg/m².  Physical Exam  Constitutional:       General: He is not in acute distress.     Appearance: Normal appearance. He is not ill-appearing.   HENT:      Head: Normocephalic and atraumatic.      Mouth/Throat:      Mouth: Mucous membranes are moist.      Pharynx: No posterior oropharyngeal erythema.   Eyes:      General: No scleral icterus.  Cardiovascular:      Rate and Rhythm: Normal rate and regular rhythm.      Heart sounds: Normal heart sounds.   Pulmonary:      Effort: Pulmonary effort is normal.      Breath sounds: " Normal breath sounds.   Abdominal:      General: Abdomen is flat. Bowel sounds are normal. There is no distension.      Palpations: Abdomen is soft. There is no mass.      Tenderness: There is no abdominal tenderness. There is no guarding or rebound. Negative signs include Lanier's sign.      Hernia: No hernia is present.   Musculoskeletal:      Cervical back: Neck supple.   Skin:     General: Skin is warm.      Capillary Refill: Capillary refill takes less than 2 seconds.   Neurological:      General: No focal deficit present.      Mental Status: He is alert and oriented to person, place, and time.   Psychiatric:         Mood and Affect: Mood normal.         Behavior: Behavior normal.         Thought Content: Thought content normal.         Judgment: Judgment normal.         CLINICAL DATA REVIEWED   reviewed previous lab results and integrated with today's visit, reviewed notes from other physicians and/or last GI encounter, reviewed previous endoscopy results and available photos, reviewed surgical pathology results from previous biopsies    ASSESSMENT  Diagnoses and all orders for this visit:    Chemical gastritis    Gastroesophageal reflux disease without esophagitis    Personal history of adenomatous and serrated colon polyps    Therapeutic opioid-induced constipation (OIC)          PLAN    2/2032 Recall colon  Continue BID PPI, sucralfate, encouraged to limit NSAIDs as best as possible  Reviewed with patient would need to see sooner if any melena or concerns for bloodloss  OIC: PRN miralax, call if becomes an issues    Return in about 1 year (around 5/1/2026).    I have discussed the above plan with the patient.  They verbalize understanding and are in agreement with the plan.  They have been advised to contact the office for any questions, concerns, or changes related to their health.

## 2025-05-15 DIAGNOSIS — K22.10 ULCERATIVE ESOPHAGITIS: ICD-10-CM

## 2025-05-15 DIAGNOSIS — K21.00 GASTROESOPHAGEAL REFLUX DISEASE WITH ESOPHAGITIS WITHOUT HEMORRHAGE: ICD-10-CM

## 2025-05-15 RX ORDER — OMEPRAZOLE 40 MG/1
40 CAPSULE, DELAYED RELEASE ORAL 2 TIMES DAILY
Qty: 180 CAPSULE | Refills: 2 | Status: SHIPPED | OUTPATIENT
Start: 2025-05-15

## 2025-07-31 ENCOUNTER — TELEPHONE (OUTPATIENT)
Age: 70
End: 2025-07-31
Payer: MEDICARE

## 2025-08-01 ENCOUNTER — OFFICE VISIT (OUTPATIENT)
Age: 70
End: 2025-08-01
Payer: MEDICARE

## 2025-08-01 ENCOUNTER — PREP FOR SURGERY (OUTPATIENT)
Dept: OTHER | Facility: HOSPITAL | Age: 70
End: 2025-08-01
Payer: MEDICARE

## 2025-08-01 ENCOUNTER — TELEPHONE (OUTPATIENT)
Age: 70
End: 2025-08-01
Payer: MEDICARE

## 2025-08-01 VITALS
DIASTOLIC BLOOD PRESSURE: 96 MMHG | RESPIRATION RATE: 16 BRPM | SYSTOLIC BLOOD PRESSURE: 160 MMHG | HEART RATE: 101 BPM | WEIGHT: 216 LBS | TEMPERATURE: 97.5 F | BODY MASS INDEX: 33.9 KG/M2 | HEIGHT: 67 IN | OXYGEN SATURATION: 95 %

## 2025-08-01 DIAGNOSIS — M54.16 LUMBAR RADICULOPATHY: ICD-10-CM

## 2025-08-01 DIAGNOSIS — M48.062 LUMBAR STENOSIS WITH NEUROGENIC CLAUDICATION: Primary | ICD-10-CM

## 2025-08-01 DIAGNOSIS — M51.362 DEGENERATION OF INTERVERTEBRAL DISC OF LUMBAR REGION WITH DISCOGENIC BACK PAIN AND LOWER EXTREMITY PAIN: ICD-10-CM

## 2025-08-01 DIAGNOSIS — M47.816 LUMBAR FACET ARTHROPATHY: ICD-10-CM

## 2025-08-01 PROCEDURE — 1125F AMNT PAIN NOTED PAIN PRSNT: CPT

## 2025-08-01 PROCEDURE — 99214 OFFICE O/P EST MOD 30 MIN: CPT

## 2025-08-01 PROCEDURE — 1160F RVW MEDS BY RX/DR IN RCRD: CPT

## 2025-08-01 PROCEDURE — 1159F MED LIST DOCD IN RCRD: CPT

## 2025-08-01 NOTE — PROGRESS NOTES
CHIEF COMPLAINT  Back pain    Subjective   Trenton Sears is a 69 y.o. male.   He presents to the office for evaluation of back pain. He was referred here by Dr Wynn.     Patient reports that back pain has been an ongoing issue for many years. He worked a physical job for most of his life which he believes contributed to his pain. He notes pain worsened about 4 years ago when he tried lifting a heavy item. Pain has been waxing and waning since.     Today pain is 4/10VAS in severity (severity in pain varies based on activity). His main complaint today is right sided low back pain that radiates into right lateral/anterior thigh, terminates mid thigh. He reports intermittent left anterior thigh pain. Describes this pain as a nearly continuous ache. He contributes 50% of pain to his back and 50% of pain to his right leg. Pain is worsened by prolonged walking or standing, lifting certain items, and bending/twisting. Pain improves with rest/reposition and medications. He has completed PT with minimal improvement to his pain.     Current medication regimen includes Hydrocodone 7.5mg 4/day, Celebrex 200mg, Gabapentin 300mg TID, and Lorazepam 1mg BID PRN. He takes OTC Tylenol PRN. These medications are prescribed by Dr. Wynn.     He was seen by Dr. Escobar in 2023 where he underwent 3 LESI's (L4-L5 and L3-L4 LESI) that offered varying degrees of relief.  He notes that his last epidural injection (L4-L5 LESI) performed by Dr. Ireland's on 1/25/2024 offered relief for over 6 months.    Past pain medications: OxyContin (Dr. Wynn) - did not like how this medication made him feel     Past therapies:  Physical Therapy: Yes  Chiropractor: No  Massage Therapy: No  TENS: Yes  Neck or back surgery: Yes - 2023 - Right L2-L3 lumbar decompression - Dr. Hester - patient tapia not recall having this surgery   Past pain management: Yes - Dr. Escobar and Dr. Noriega - [facility]      Back Pain  Chronicity:  Chronic  Onset:  More than 1 year  ago  Frequency:  Constantly  Progression since onset:  Coming and going  Pain location:  Lumbar spine  Pain quality:  Aching  Radiates to:  Right thigh (right lateral/anterior thigh)  Pain-numeric:  4/10  Pain severity:  Moderate  Aggravated by:  Standing, twisting, bending and certain positions  Associated symptoms: no numbness and no weakness    Treatments tried:  Physical therapy, heat, ice, muscle relaxant and analgesics  Improvement on treatment:  Mild     PEG Assessment   What number best describes your pain on average in the past week?5  What number best describes how, during the past week, pain has interfered with your enjoyment of life?6  What number best describes how, during the past week, pain has interfered with your general activity?  6      Current Outpatient Medications:     amLODIPine (NORVASC) 10 MG tablet, Take 1 tablet by mouth Daily., Disp: , Rfl:     atorvastatin (LIPITOR) 20 MG tablet, Take 1 tablet by mouth Every Night., Disp: , Rfl:     celecoxib (CeleBREX) 200 MG capsule, Take 1 capsule by mouth Daily. PT HOLDING FOR SURGERY, Disp: , Rfl:     DULoxetine (CYMBALTA) 60 MG capsule, Take 1 capsule by mouth Every Evening., Disp: , Rfl:     famotidine (PEPCID) 40 MG tablet, Take 1 tablet by mouth Daily., Disp: 90 tablet, Rfl: 3    gabapentin (NEURONTIN) 300 MG capsule, 1 capsule., Disp: , Rfl:     HYDROcodone-acetaminophen (NORCO) 7.5-325 MG per tablet, Take 1 tablet by mouth Every 6 (Six) Hours As Needed., Disp: , Rfl:     ibuprofen (ADVIL,MOTRIN) 800 MG tablet, Take 1 tablet by mouth 2 (Two) Times a Day As Needed for Mild Pain or Moderate Pain. PT HOLDING FOR SURGERY, Disp: , Rfl:     lisinopril-hydrochlorothiazide (PRINZIDE,ZESTORETIC) 20-25 MG per tablet, Take 1 tablet by mouth Daily., Disp: , Rfl:     LORazepam (ATIVAN) 1 MG tablet, Take 1 tablet by mouth 2 (Two) Times a Day., Disp: , Rfl:     meclizine (ANTIVERT) 25 MG tablet, Take 1 tablet 3 times a day by oral route as needed., Disp: ,  "Rfl:     Multiple Vitamins-Minerals (CENTRUM SILVER PO), Take 1 tablet by mouth Daily. PT HOLDING FOR SURGERY, Disp: , Rfl:     omeprazole (priLOSEC) 40 MG capsule, TAKE 1 CAPSULE BY MOUTH 2 TIMES A DAY, Disp: 180 capsule, Rfl: 2    potassium chloride ER (K-TAB) 20 MEQ tablet controlled-release ER tablet, , Disp: , Rfl:     sucralfate (CARAFATE) 1 g tablet, TAKE ONE TABLET BY MOUTH FOUR TIMES A DAY, Disp: 120 tablet, Rfl: 3    Ventolin  (90 Base) MCG/ACT inhaler, Inhale 2 puffs Every 4 (Four) Hours As Needed., Disp: , Rfl:     The following portions of the patient's history were reviewed and updated as appropriate: allergies, current medications, past family history, past medical history, past social history, past surgical history, and problem list.    REVIEW OF PERTINENT MEDICAL DATA    Review of Systems   Constitutional:  Positive for activity change (less).   Gastrointestinal:  Negative for constipation and diarrhea.   Genitourinary:  Negative for difficulty urinating.   Musculoskeletal:  Positive for back pain.   Neurological:  Negative for weakness and numbness.   Psychiatric/Behavioral:  Positive for sleep disturbance. Negative for self-injury and suicidal ideas.        I have reviewed and confirmed the accuracy of the ROS as documented by the MA/LPN/RN FLOR Pat    Vitals:    08/01/25 0800   BP: 160/96   Pulse: 101   Resp: 16   Temp: 97.5 °F (36.4 °C)   SpO2: 95%   Weight: 98 kg (216 lb)   Height: 170.2 cm (67.01\")   PainSc: 4      Objective     Physical Exam  Constitutional:       Appearance: Normal appearance.   HENT:      Head: Normocephalic.   Cardiovascular:      Rate and Rhythm: Normal rate and regular rhythm.   Pulmonary:      Effort: Pulmonary effort is normal.      Breath sounds: Normal breath sounds.   Musculoskeletal:      Cervical back: Normal range of motion.      Lumbar back: Tenderness and bony tenderness present. Decreased range of motion. Positive right straight leg raise " test and positive left straight leg raise test.      Comments: + lumbar facet loading/tenderness    Skin:     General: Skin is warm and dry.      Capillary Refill: Capillary refill takes less than 2 seconds.   Neurological:      General: No focal deficit present.      Mental Status: He is alert and oriented to person, place, and time.   Psychiatric:         Mood and Affect: Mood normal.         Behavior: Behavior normal.         Thought Content: Thought content normal.         Cognition and Memory: Cognition normal.       Assessment & Plan   Diagnoses and all orders for this visit:    1. Lumbar stenosis with neurogenic claudication (Primary)    2. Lumbar radiculopathy    3. Degeneration of intervertebral disc of lumbar region with discogenic back pain and lower extremity pain    4. Lumbar facet arthropathy      --- Trenton Sears reports a pain score of 4.  Given his pain assessment as noted, treatment options were discussed and the following options were decided upon as a follow-up plan to address the patient's pain: continuation of current treatment plan for pain and steroid injections.    --- L4/L5 LESI   ---  Indications for epidural injection:  Plan is to proceed with epidural at the appropriate level.  If the patient receives significant pain reduction and improvement in function and the plan will be to repeat the epidural when the pain worsens.  If a second epidural provides at least 6 weeks of sustained improvement that includes both pain reduction and improvement in function then an epidural injection could be repeated once again at the same level.  This is a mutual decision between the clinician and the patient that includes discussions including risks and benefits in detail as well as alternative therapies.  Patient's questions were answered to their satisfaction and to their understanding.  ---   --- Received/review records from Dr. Adair. Scanned into media.   --- Follow-up for procedure     CARMEN  REPORT  As the clinician, I personally reviewed the CARMEN from 8/1/25 while the patient was in the office today.    Dictated utilizing Dragon dictation.

## 2025-08-14 ENCOUNTER — TELEPHONE (OUTPATIENT)
Dept: PAIN MEDICINE | Facility: HOSPITAL | Age: 70
End: 2025-08-14
Payer: MEDICARE

## 2025-08-14 RX ORDER — DEXAMETHASONE SODIUM PHOSPHATE 10 MG/ML
10 INJECTION, SOLUTION INTRAMUSCULAR; INTRAVENOUS ONCE
OUTPATIENT
Start: 2025-08-14 | End: 2025-08-14

## 2025-08-14 RX ORDER — SODIUM CHLORIDE 9 MG/ML
4 INJECTION, SOLUTION INTRAMUSCULAR; INTRAVENOUS; SUBCUTANEOUS ONCE
OUTPATIENT
Start: 2025-08-14 | End: 2025-08-14

## 2025-08-14 RX ORDER — BUPIVACAINE HYDROCHLORIDE 2.5 MG/ML
0.5 INJECTION, SOLUTION EPIDURAL; INFILTRATION; INTRACAUDAL; PERINEURAL ONCE
OUTPATIENT
Start: 2025-08-14 | End: 2025-08-14

## 2025-08-14 RX ORDER — LIDOCAINE HYDROCHLORIDE 10 MG/ML
5 INJECTION, SOLUTION EPIDURAL; INFILTRATION; INTRACAUDAL; PERINEURAL ONCE
OUTPATIENT
Start: 2025-08-14 | End: 2025-08-14

## 2025-08-14 RX ORDER — IOPAMIDOL 612 MG/ML
3 INJECTION, SOLUTION INTRAVASCULAR
OUTPATIENT
Start: 2025-08-14 | End: 2025-08-14

## 2025-08-19 ENCOUNTER — HOSPITAL ENCOUNTER (OUTPATIENT)
Dept: PAIN MEDICINE | Facility: HOSPITAL | Age: 70
Discharge: HOME | End: 2025-08-19
Payer: MEDICARE

## 2025-08-19 ENCOUNTER — HOSPITAL ENCOUNTER (OUTPATIENT)
Dept: GENERAL RADIOLOGY | Facility: HOSPITAL | Age: 70
Discharge: HOME | End: 2025-08-19
Payer: MEDICARE

## 2025-08-19 DIAGNOSIS — R52 PAIN: ICD-10-CM

## (undated) DEVICE — DRSNG TELFA PAD NONADH STR 1S 3X4IN

## (undated) DEVICE — SUT MNCRYL PLS ANTIB UD 4/0 PS2 18IN

## (undated) DEVICE — VAGINAL PACKING: Brand: DEROYAL

## (undated) DEVICE — VIAL FORMALIN CAP 10P 40ML

## (undated) DEVICE — SOL IRR H2O BO 1000ML STRL

## (undated) DEVICE — TUBING, SUCTION, 1/4" X 20', STRAIGHT: Brand: MEDLINE INDUSTRIES, INC.

## (undated) DEVICE — DBD-DRAPE,LAP,CHOLE,W/TROUGHS,STERILE: Brand: MEDLINE

## (undated) DEVICE — KT ORCA ORCAPOD DISP STRL

## (undated) DEVICE — DRAPE,UTILITY,TAPE,15X26,STERILE: Brand: MEDLINE

## (undated) DEVICE — GLV SURG SENSICARE PI MIC PF SZ8 LF STRL

## (undated) DEVICE — NDL HYPO PRECISIONGLIDE REG 25G 1 1/2

## (undated) DEVICE — Device

## (undated) DEVICE — ELECTRD BLD EZ CLN MOD XLNG 2.75IN

## (undated) DEVICE — UNDERGLV SURG BIOGEL INDICATOR LF PF 7.5

## (undated) DEVICE — FRCP BX RADJAW4 NDL 2.8 240CM LG OG BX40

## (undated) DEVICE — GLV SURG BIOGEL LTX PF 7

## (undated) DEVICE — SYR LL W/SCALE/MARK 3ML STRL

## (undated) DEVICE — LINER SURG CANSTR SXN S/RIGD 1500CC

## (undated) DEVICE — ADHS SKIN SURG TISS VISC PREMIERPRO EXOFIN HI/VISC FAST/DRY

## (undated) DEVICE — JACKT LAB F/R KNIT CUFF/COLR XLG BLU

## (undated) DEVICE — APPL CHLORAPREP HI/LITE 26ML ORNG

## (undated) DEVICE — SUT SILK 4/0 TIES 18IN A183H

## (undated) DEVICE — SUT SILK 2/0 SH 30IN K833H

## (undated) DEVICE — ANTIBACTERIAL UNDYED BRAIDED (POLYGLACTIN 910), SYNTHETIC ABSORBABLE SUTURE: Brand: COATED VICRYL

## (undated) DEVICE — THE BITE BLOCK MAXI, LATEX FREE STRAP IS USED TO PROTECT THE ENDOSCOPE INSERTION TUBE FROM BEING BITTEN BY THE PATIENT.

## (undated) DEVICE — NDL HYPO ECLPS SFTY 22G 1 1/2IN

## (undated) DEVICE — ADAPT CLN BIOGUARD AIR/H2O DISP

## (undated) DEVICE — MEDI-VAC YANKAUER SUCTION HANDLE W/BULBOUS TIP: Brand: CARDINAL HEALTH

## (undated) DEVICE — BW-412T DISP COMBO CLEANING BRUSH: Brand: SINGLE USE COMBINATION CLEANING BRUSH

## (undated) DEVICE — LABEL SHEET CUSTOM 2X2 YELLOW: Brand: MEDLINE INDUSTRIES, INC.

## (undated) DEVICE — PK CHST BRST 40